# Patient Record
Sex: MALE | Race: BLACK OR AFRICAN AMERICAN | Employment: FULL TIME | URBAN - METROPOLITAN AREA
[De-identification: names, ages, dates, MRNs, and addresses within clinical notes are randomized per-mention and may not be internally consistent; named-entity substitution may affect disease eponyms.]

---

## 2017-05-27 ENCOUNTER — HOSPITAL ENCOUNTER (INPATIENT)
Age: 42
LOS: 2 days | Discharge: HOME | DRG: 383 | End: 2017-05-29
Attending: INTERNAL MEDICINE | Admitting: INTERNAL MEDICINE
Payer: MEDICARE

## 2017-05-27 DIAGNOSIS — L02.31 LEFT BUTTOCK ABSCESS: Primary | ICD-10-CM

## 2017-05-27 DIAGNOSIS — N17.9 ACUTE KIDNEY INJURY (HCC): ICD-10-CM

## 2017-05-27 PROBLEM — N18.9 ACUTE ON CHRONIC KIDNEY FAILURE (HCC): Status: ACTIVE | Noted: 2017-05-27

## 2017-05-27 LAB
ABSOLUTE EOS #: 0.3 K/UL (ref 0–0.4)
ABSOLUTE LYMPH #: 1.5 K/UL (ref 1–4.8)
ABSOLUTE MONO #: 1.2 K/UL (ref 0.2–0.8)
ANION GAP SERPL CALCULATED.3IONS-SCNC: 20 MMOL/L (ref 9–17)
BASOPHILS # BLD: 0 %
BASOPHILS ABSOLUTE: 0 K/UL (ref 0–0.2)
BUN BLDV-MCNC: 50 MG/DL (ref 6–20)
BUN/CREAT BLD: 16 (ref 9–20)
CALCIUM SERPL-MCNC: 9.6 MG/DL (ref 8.6–10.4)
CHLORIDE BLD-SCNC: 92 MMOL/L (ref 98–107)
CHOLESTEROL/HDL RATIO: 3
CHOLESTEROL: 142 MG/DL
CHP ED QC CHECK: NORMAL
CO2: 21 MMOL/L (ref 20–31)
CREAT SERPL-MCNC: 3.21 MG/DL (ref 0.7–1.2)
DIFFERENTIAL TYPE: ABNORMAL
EOSINOPHILS RELATIVE PERCENT: 2 %
GFR AFRICAN AMERICAN: 26 ML/MIN
GFR NON-AFRICAN AMERICAN: 21 ML/MIN
GFR SERPL CREATININE-BSD FRML MDRD: ABNORMAL ML/MIN/{1.73_M2}
GFR SERPL CREATININE-BSD FRML MDRD: ABNORMAL ML/MIN/{1.73_M2}
GLUCOSE BLD-MCNC: 276 MG/DL
GLUCOSE BLD-MCNC: 276 MG/DL (ref 75–110)
GLUCOSE BLD-MCNC: 293 MG/DL (ref 70–99)
GLUCOSE BLD-MCNC: 376 MG/DL (ref 75–110)
HCT VFR BLD CALC: 33.1 % (ref 41–53)
HDLC SERPL-MCNC: 47 MG/DL
HEMOGLOBIN: 11.3 G/DL (ref 13.5–17.5)
LACTIC ACID: 0.8 MMOL/L (ref 0.5–2.2)
LACTIC ACID: 1.1 MMOL/L (ref 0.5–2.2)
LDL CHOLESTEROL: 76 MG/DL (ref 0–130)
LYMPHOCYTES # BLD: 10 %
MCH RBC QN AUTO: 32.1 PG (ref 26–34)
MCHC RBC AUTO-ENTMCNC: 34 G/DL (ref 31–37)
MCV RBC AUTO: 94.4 FL (ref 80–100)
MONOCYTES # BLD: 8 %
PDW BLD-RTO: 13.9 % (ref 11.5–14.5)
PLATELET # BLD: 345 K/UL (ref 130–400)
PLATELET ESTIMATE: ABNORMAL
PMV BLD AUTO: 7.6 FL (ref 6–12)
POTASSIUM SERPL-SCNC: 5.3 MMOL/L (ref 3.7–5.3)
RBC # BLD: 3.5 M/UL (ref 4.5–5.9)
RBC # BLD: ABNORMAL 10*6/UL
SEG NEUTROPHILS: 80 %
SEGMENTED NEUTROPHILS ABSOLUTE COUNT: 12.5 K/UL (ref 1.8–7.7)
SODIUM BLD-SCNC: 133 MMOL/L (ref 135–144)
THYROXINE, FREE: 0.99 NG/DL (ref 0.93–1.7)
TRIGL SERPL-MCNC: 94 MG/DL
TSH SERPL DL<=0.05 MIU/L-ACNC: 0.88 MIU/L (ref 0.3–5)
VLDLC SERPL CALC-MCNC: NORMAL MG/DL (ref 1–30)
WBC # BLD: 15.4 K/UL (ref 3.5–11)
WBC # BLD: ABNORMAL 10*3/UL

## 2017-05-27 PROCEDURE — 6370000000 HC RX 637 (ALT 250 FOR IP): Performed by: NURSE PRACTITIONER

## 2017-05-27 PROCEDURE — 96375 TX/PRO/DX INJ NEW DRUG ADDON: CPT

## 2017-05-27 PROCEDURE — 99284 EMERGENCY DEPT VISIT MOD MDM: CPT

## 2017-05-27 PROCEDURE — 80061 LIPID PANEL: CPT

## 2017-05-27 PROCEDURE — 1200000000 HC SEMI PRIVATE

## 2017-05-27 PROCEDURE — 83605 ASSAY OF LACTIC ACID: CPT

## 2017-05-27 PROCEDURE — 36415 COLL VENOUS BLD VENIPUNCTURE: CPT

## 2017-05-27 PROCEDURE — 82947 ASSAY GLUCOSE BLOOD QUANT: CPT

## 2017-05-27 PROCEDURE — 6360000002 HC RX W HCPCS: Performed by: NURSE PRACTITIONER

## 2017-05-27 PROCEDURE — 80048 BASIC METABOLIC PNL TOTAL CA: CPT

## 2017-05-27 PROCEDURE — 96361 HYDRATE IV INFUSION ADD-ON: CPT

## 2017-05-27 PROCEDURE — 96376 TX/PRO/DX INJ SAME DRUG ADON: CPT

## 2017-05-27 PROCEDURE — 82043 UR ALBUMIN QUANTITATIVE: CPT

## 2017-05-27 PROCEDURE — 84443 ASSAY THYROID STIM HORMONE: CPT

## 2017-05-27 PROCEDURE — 85025 COMPLETE CBC W/AUTO DIFF WBC: CPT

## 2017-05-27 PROCEDURE — 2500000003 HC RX 250 WO HCPCS: Performed by: NURSE PRACTITIONER

## 2017-05-27 PROCEDURE — 87070 CULTURE OTHR SPECIMN AEROBIC: CPT

## 2017-05-27 PROCEDURE — 87205 SMEAR GRAM STAIN: CPT

## 2017-05-27 PROCEDURE — 96374 THER/PROPH/DIAG INJ IV PUSH: CPT

## 2017-05-27 PROCEDURE — 84439 ASSAY OF FREE THYROXINE: CPT

## 2017-05-27 PROCEDURE — 6370000000 HC RX 637 (ALT 250 FOR IP): Performed by: INTERNAL MEDICINE

## 2017-05-27 PROCEDURE — 87040 BLOOD CULTURE FOR BACTERIA: CPT

## 2017-05-27 PROCEDURE — 82570 ASSAY OF URINE CREATININE: CPT

## 2017-05-27 PROCEDURE — 2580000003 HC RX 258: Performed by: NURSE PRACTITIONER

## 2017-05-27 PROCEDURE — 83036 HEMOGLOBIN GLYCOSYLATED A1C: CPT

## 2017-05-27 RX ORDER — NICOTINE POLACRILEX 4 MG
15 LOZENGE BUCCAL PRN
Status: DISCONTINUED | OUTPATIENT
Start: 2017-05-27 | End: 2017-05-29 | Stop reason: HOSPADM

## 2017-05-27 RX ORDER — ACETAMINOPHEN 500 MG
1000 TABLET ORAL ONCE
Status: COMPLETED | OUTPATIENT
Start: 2017-05-27 | End: 2017-05-27

## 2017-05-27 RX ORDER — DEXTROSE MONOHYDRATE 25 G/50ML
12.5 INJECTION, SOLUTION INTRAVENOUS PRN
Status: DISCONTINUED | OUTPATIENT
Start: 2017-05-27 | End: 2017-05-29 | Stop reason: HOSPADM

## 2017-05-27 RX ORDER — ONDANSETRON 2 MG/ML
8 INJECTION INTRAMUSCULAR; INTRAVENOUS ONCE
Status: COMPLETED | OUTPATIENT
Start: 2017-05-27 | End: 2017-05-27

## 2017-05-27 RX ORDER — 0.9 % SODIUM CHLORIDE 0.9 %
1000 INTRAVENOUS SOLUTION INTRAVENOUS ONCE
Status: COMPLETED | OUTPATIENT
Start: 2017-05-27 | End: 2017-05-27

## 2017-05-27 RX ORDER — INSULIN GLARGINE 100 [IU]/ML
30 INJECTION, SOLUTION SUBCUTANEOUS NIGHTLY
Status: DISCONTINUED | OUTPATIENT
Start: 2017-05-27 | End: 2017-05-29 | Stop reason: HOSPADM

## 2017-05-27 RX ORDER — ONDANSETRON 2 MG/ML
4 INJECTION INTRAMUSCULAR; INTRAVENOUS EVERY 4 HOURS PRN
Status: DISCONTINUED | OUTPATIENT
Start: 2017-05-27 | End: 2017-05-29 | Stop reason: HOSPADM

## 2017-05-27 RX ORDER — MORPHINE SULFATE 2 MG/ML
2 INJECTION, SOLUTION INTRAMUSCULAR; INTRAVENOUS ONCE
Status: COMPLETED | OUTPATIENT
Start: 2017-05-27 | End: 2017-05-27

## 2017-05-27 RX ORDER — NICOTINE 21 MG/24HR
1 PATCH, TRANSDERMAL 24 HOURS TRANSDERMAL DAILY
Status: DISCONTINUED | OUTPATIENT
Start: 2017-05-28 | End: 2017-05-29 | Stop reason: HOSPADM

## 2017-05-27 RX ORDER — AMLODIPINE BESYLATE 10 MG/1
10 TABLET ORAL DAILY
Status: DISCONTINUED | OUTPATIENT
Start: 2017-05-28 | End: 2017-05-29 | Stop reason: HOSPADM

## 2017-05-27 RX ORDER — LIDOCAINE HYDROCHLORIDE 10 MG/ML
20 INJECTION, SOLUTION INFILTRATION; PERINEURAL ONCE
Status: COMPLETED | OUTPATIENT
Start: 2017-05-27 | End: 2017-05-27

## 2017-05-27 RX ORDER — DEXTROSE MONOHYDRATE 50 MG/ML
100 INJECTION, SOLUTION INTRAVENOUS PRN
Status: DISCONTINUED | OUTPATIENT
Start: 2017-05-27 | End: 2017-05-29 | Stop reason: HOSPADM

## 2017-05-27 RX ADMIN — VANCOMYCIN HYDROCHLORIDE 1500 MG: 1 INJECTION, POWDER, LYOPHILIZED, FOR SOLUTION INTRAVENOUS at 19:27

## 2017-05-27 RX ADMIN — SODIUM CHLORIDE 1000 ML: 9 INJECTION, SOLUTION INTRAVENOUS at 17:02

## 2017-05-27 RX ADMIN — ACETAMINOPHEN 1000 MG: 500 TABLET ORAL at 17:54

## 2017-05-27 RX ADMIN — ONDANSETRON 8 MG: 2 INJECTION INTRAMUSCULAR; INTRAVENOUS at 17:00

## 2017-05-27 RX ADMIN — Medication 5 UNITS: at 23:07

## 2017-05-27 RX ADMIN — MORPHINE SULFATE 2 MG: 2 INJECTION, SOLUTION INTRAMUSCULAR; INTRAVENOUS at 18:36

## 2017-05-27 RX ADMIN — Medication 30 UNITS: at 23:08

## 2017-05-27 RX ADMIN — MORPHINE SULFATE 2 MG: 2 INJECTION, SOLUTION INTRAMUSCULAR; INTRAVENOUS at 17:01

## 2017-05-27 RX ADMIN — LIDOCAINE HYDROCHLORIDE 20 ML: 10 INJECTION, SOLUTION INFILTRATION; PERINEURAL at 18:58

## 2017-05-27 ASSESSMENT — PAIN SCALES - GENERAL
PAINLEVEL_OUTOF10: 10

## 2017-05-27 ASSESSMENT — ENCOUNTER SYMPTOMS
CONSTIPATION: 0
SHORTNESS OF BREATH: 0
ABDOMINAL PAIN: 1
NAUSEA: 1
COLOR CHANGE: 1
VOMITING: 1
BACK PAIN: 0
DIARRHEA: 0
COUGH: 0

## 2017-05-28 LAB
ABSOLUTE EOS #: 0.1 K/UL (ref 0–0.4)
ABSOLUTE LYMPH #: 2.3 K/UL (ref 1–4.8)
ABSOLUTE MONO #: 1.9 K/UL (ref 0.2–0.8)
ANION GAP SERPL CALCULATED.3IONS-SCNC: 16 MMOL/L (ref 9–17)
BASOPHILS # BLD: 1 %
BASOPHILS ABSOLUTE: 0.1 K/UL (ref 0–0.2)
BUN BLDV-MCNC: 48 MG/DL (ref 6–20)
CHLORIDE BLD-SCNC: 97 MMOL/L (ref 98–107)
CHLORIDE, UR: 65 MMOL/L
CO2: 23 MMOL/L (ref 20–31)
CREAT SERPL-MCNC: 3.05 MG/DL (ref 0.7–1.2)
CREATININE URINE: 110.3 MG/DL (ref 39–259)
CREATININE URINE: 135 MG/DL (ref 39–259)
DIFFERENTIAL TYPE: ABNORMAL
EOSINOPHIL,URINE: NORMAL
EOSINOPHILS RELATIVE PERCENT: 1 %
GFR AFRICAN AMERICAN: 27 ML/MIN
GFR NON-AFRICAN AMERICAN: 23 ML/MIN
GFR SERPL CREATININE-BSD FRML MDRD: ABNORMAL ML/MIN/{1.73_M2}
GFR SERPL CREATININE-BSD FRML MDRD: ABNORMAL ML/MIN/{1.73_M2}
GLUCOSE BLD-MCNC: 122 MG/DL (ref 75–110)
GLUCOSE BLD-MCNC: 150 MG/DL (ref 75–110)
GLUCOSE BLD-MCNC: 170 MG/DL (ref 75–110)
GLUCOSE BLD-MCNC: 197 MG/DL (ref 75–110)
HCT VFR BLD CALC: 28.7 % (ref 41–53)
HEMOGLOBIN: 9.6 G/DL (ref 13.5–17.5)
LACTIC ACID: 0.8 MMOL/L (ref 0.5–2.2)
LACTIC ACID: 0.9 MMOL/L (ref 0.5–2.2)
LACTIC ACID: 1 MMOL/L (ref 0.5–2.2)
LYMPHOCYTES # BLD: 16 %
MCH RBC QN AUTO: 31.5 PG (ref 26–34)
MCHC RBC AUTO-ENTMCNC: 33.4 G/DL (ref 31–37)
MCV RBC AUTO: 94.5 FL (ref 80–100)
MICROALBUMIN/CREAT 24H UR: 731 MG/L
MICROALBUMIN/CREAT UR-RTO: 541 MCG/MG CREAT
MONOCYTES # BLD: 13 %
PDW BLD-RTO: 14 % (ref 11.5–14.5)
PLATELET # BLD: 304 K/UL (ref 130–400)
PLATELET ESTIMATE: ABNORMAL
PMV BLD AUTO: 7.3 FL (ref 6–12)
POTASSIUM SERPL-SCNC: 4.9 MMOL/L (ref 3.7–5.3)
POTASSIUM, UR: 17.6 MMOL/L
RBC # BLD: 3.04 M/UL (ref 4.5–5.9)
RBC # BLD: ABNORMAL 10*6/UL
SEG NEUTROPHILS: 69 %
SEGMENTED NEUTROPHILS ABSOLUTE COUNT: 10.1 K/UL (ref 1.8–7.7)
SODIUM BLD-SCNC: 136 MMOL/L (ref 135–144)
SODIUM,UR: 85 MMOL/L
TOTAL PROTEIN, URINE: 68 MG/DL
WBC # BLD: 14.5 K/UL (ref 3.5–11)
WBC # BLD: ABNORMAL 10*3/UL

## 2017-05-28 PROCEDURE — 85025 COMPLETE CBC W/AUTO DIFF WBC: CPT

## 2017-05-28 PROCEDURE — 6360000002 HC RX W HCPCS: Performed by: INTERNAL MEDICINE

## 2017-05-28 PROCEDURE — 86335 IMMUNFIX E-PHORSIS/URINE/CSF: CPT

## 2017-05-28 PROCEDURE — 2580000003 HC RX 258: Performed by: INTERNAL MEDICINE

## 2017-05-28 PROCEDURE — 84520 ASSAY OF UREA NITROGEN: CPT

## 2017-05-28 PROCEDURE — 82947 ASSAY GLUCOSE BLOOD QUANT: CPT

## 2017-05-28 PROCEDURE — 36415 COLL VENOUS BLD VENIPUNCTURE: CPT

## 2017-05-28 PROCEDURE — 51798 US URINE CAPACITY MEASURE: CPT

## 2017-05-28 PROCEDURE — 6370000000 HC RX 637 (ALT 250 FOR IP): Performed by: INTERNAL MEDICINE

## 2017-05-28 PROCEDURE — 83605 ASSAY OF LACTIC ACID: CPT

## 2017-05-28 PROCEDURE — 80051 ELECTROLYTE PANEL: CPT

## 2017-05-28 PROCEDURE — 82436 ASSAY OF URINE CHLORIDE: CPT

## 2017-05-28 PROCEDURE — 2580000003 HC RX 258: Performed by: NURSE PRACTITIONER

## 2017-05-28 PROCEDURE — 87205 SMEAR GRAM STAIN: CPT

## 2017-05-28 PROCEDURE — 1200000000 HC SEMI PRIVATE

## 2017-05-28 PROCEDURE — 84300 ASSAY OF URINE SODIUM: CPT

## 2017-05-28 PROCEDURE — 84156 ASSAY OF PROTEIN URINE: CPT

## 2017-05-28 PROCEDURE — 82565 ASSAY OF CREATININE: CPT

## 2017-05-28 PROCEDURE — 82570 ASSAY OF URINE CREATININE: CPT

## 2017-05-28 PROCEDURE — 84133 ASSAY OF URINE POTASSIUM: CPT

## 2017-05-28 PROCEDURE — 6360000002 HC RX W HCPCS: Performed by: NURSE PRACTITIONER

## 2017-05-28 RX ORDER — SODIUM CHLORIDE 9 MG/ML
INJECTION, SOLUTION INTRAVENOUS CONTINUOUS
Status: DISCONTINUED | OUTPATIENT
Start: 2017-05-28 | End: 2017-05-29 | Stop reason: HOSPADM

## 2017-05-28 RX ADMIN — Medication 1 UNITS: at 21:47

## 2017-05-28 RX ADMIN — Medication 30 UNITS: at 21:46

## 2017-05-28 RX ADMIN — VANCOMYCIN HYDROCHLORIDE 1500 MG: 1 INJECTION, POWDER, LYOPHILIZED, FOR SOLUTION INTRAVENOUS at 21:47

## 2017-05-28 RX ADMIN — INSULIN LISPRO 2 UNITS: 100 INJECTION, SOLUTION INTRAVENOUS; SUBCUTANEOUS at 17:07

## 2017-05-28 RX ADMIN — HYDROMORPHONE HYDROCHLORIDE 1 MG: 1 INJECTION, SOLUTION INTRAMUSCULAR; INTRAVENOUS; SUBCUTANEOUS at 21:46

## 2017-05-28 RX ADMIN — INSULIN LISPRO 2 UNITS: 100 INJECTION, SOLUTION INTRAVENOUS; SUBCUTANEOUS at 12:19

## 2017-05-28 RX ADMIN — HYDROMORPHONE HYDROCHLORIDE 1 MG: 1 INJECTION, SOLUTION INTRAMUSCULAR; INTRAVENOUS; SUBCUTANEOUS at 01:23

## 2017-05-28 RX ADMIN — SODIUM CHLORIDE: 9 INJECTION, SOLUTION INTRAVENOUS at 11:42

## 2017-05-28 RX ADMIN — HYDROMORPHONE HYDROCHLORIDE 1 MG: 1 INJECTION, SOLUTION INTRAMUSCULAR; INTRAVENOUS; SUBCUTANEOUS at 15:47

## 2017-05-28 RX ADMIN — SODIUM CHLORIDE: 9 INJECTION, SOLUTION INTRAVENOUS at 19:00

## 2017-05-28 RX ADMIN — HYDROMORPHONE HYDROCHLORIDE 1 MG: 1 INJECTION, SOLUTION INTRAMUSCULAR; INTRAVENOUS; SUBCUTANEOUS at 09:32

## 2017-05-28 RX ADMIN — AMLODIPINE BESYLATE 10 MG: 10 TABLET ORAL at 09:21

## 2017-05-28 ASSESSMENT — PAIN DESCRIPTION - ONSET
ONSET: ON-GOING
ONSET: GRADUAL

## 2017-05-28 ASSESSMENT — PAIN DESCRIPTION - DESCRIPTORS
DESCRIPTORS: DISCOMFORT;PRESSURE;SHARP
DESCRIPTORS: TENDER;SHARP
DESCRIPTORS: THROBBING
DESCRIPTORS: THROBBING

## 2017-05-28 ASSESSMENT — PAIN DESCRIPTION - LOCATION
LOCATION: BUTTOCKS

## 2017-05-28 ASSESSMENT — PAIN DESCRIPTION - PAIN TYPE
TYPE: ACUTE PAIN

## 2017-05-28 ASSESSMENT — PAIN SCALES - GENERAL
PAINLEVEL_OUTOF10: 8
PAINLEVEL_OUTOF10: 0
PAINLEVEL_OUTOF10: 3
PAINLEVEL_OUTOF10: 8
PAINLEVEL_OUTOF10: 6
PAINLEVEL_OUTOF10: 3
PAINLEVEL_OUTOF10: 8
PAINLEVEL_OUTOF10: 8
PAINLEVEL_OUTOF10: 9

## 2017-05-28 ASSESSMENT — PAIN DESCRIPTION - ORIENTATION
ORIENTATION: LEFT;INNER
ORIENTATION: LEFT

## 2017-05-28 ASSESSMENT — PAIN DESCRIPTION - FREQUENCY
FREQUENCY: CONTINUOUS
FREQUENCY: INTERMITTENT
FREQUENCY: CONTINUOUS
FREQUENCY: CONTINUOUS

## 2017-05-29 VITALS
TEMPERATURE: 97.9 F | HEART RATE: 72 BPM | DIASTOLIC BLOOD PRESSURE: 72 MMHG | HEIGHT: 67 IN | OXYGEN SATURATION: 100 % | BODY MASS INDEX: 35.31 KG/M2 | WEIGHT: 225 LBS | SYSTOLIC BLOOD PRESSURE: 132 MMHG | RESPIRATION RATE: 18 BRPM

## 2017-05-29 LAB
ANION GAP SERPL CALCULATED.3IONS-SCNC: 13 MMOL/L (ref 9–17)
BUN BLDV-MCNC: 39 MG/DL (ref 6–20)
CHLORIDE BLD-SCNC: 100 MMOL/L (ref 98–107)
CO2: 22 MMOL/L (ref 20–31)
COMPLEMENT C3: 151 MG/DL (ref 90–180)
COMPLEMENT C4: 40 MG/DL (ref 10–40)
CREAT SERPL-MCNC: 2.44 MG/DL (ref 0.7–1.2)
CULTURE: ABNORMAL
CULTURE: ABNORMAL
DIRECT EXAM: ABNORMAL
ESTIMATED AVERAGE GLUCOSE: 174 MG/DL
GFR AFRICAN AMERICAN: 35 ML/MIN
GFR NON-AFRICAN AMERICAN: 29 ML/MIN
GFR SERPL CREATININE-BSD FRML MDRD: ABNORMAL ML/MIN/{1.73_M2}
GFR SERPL CREATININE-BSD FRML MDRD: ABNORMAL ML/MIN/{1.73_M2}
GLUCOSE BLD-MCNC: 122 MG/DL (ref 75–110)
GLUCOSE BLD-MCNC: 185 MG/DL (ref 75–110)
HBA1C MFR BLD: 7.7 % (ref 4–6)
Lab: ABNORMAL
POTASSIUM SERPL-SCNC: 4.5 MMOL/L (ref 3.7–5.3)
SODIUM BLD-SCNC: 135 MMOL/L (ref 135–144)
SPECIMEN DESCRIPTION: ABNORMAL
SPECIMEN DESCRIPTION: ABNORMAL
STATUS: ABNORMAL

## 2017-05-29 PROCEDURE — 82565 ASSAY OF CREATININE: CPT

## 2017-05-29 PROCEDURE — 80051 ELECTROLYTE PANEL: CPT

## 2017-05-29 PROCEDURE — 36415 COLL VENOUS BLD VENIPUNCTURE: CPT

## 2017-05-29 PROCEDURE — 6360000002 HC RX W HCPCS: Performed by: INTERNAL MEDICINE

## 2017-05-29 PROCEDURE — 86160 COMPLEMENT ANTIGEN: CPT

## 2017-05-29 PROCEDURE — 86038 ANTINUCLEAR ANTIBODIES: CPT

## 2017-05-29 PROCEDURE — 82947 ASSAY GLUCOSE BLOOD QUANT: CPT

## 2017-05-29 PROCEDURE — 2580000003 HC RX 258: Performed by: INTERNAL MEDICINE

## 2017-05-29 PROCEDURE — 86162 COMPLEMENT TOTAL (CH50): CPT

## 2017-05-29 PROCEDURE — 84520 ASSAY OF UREA NITROGEN: CPT

## 2017-05-29 PROCEDURE — 6370000000 HC RX 637 (ALT 250 FOR IP): Performed by: INTERNAL MEDICINE

## 2017-05-29 RX ORDER — HYDROCODONE BITARTRATE AND ACETAMINOPHEN 7.5; 325 MG/1; MG/1
1 TABLET ORAL EVERY 6 HOURS PRN
Qty: 20 TABLET | Refills: 0 | Status: SHIPPED | OUTPATIENT
Start: 2017-05-29 | End: 2020-07-08

## 2017-05-29 RX ORDER — NICOTINE 21 MG/24HR
1 PATCH, TRANSDERMAL 24 HOURS TRANSDERMAL DAILY
Qty: 30 PATCH | Refills: 0 | Status: SHIPPED | OUTPATIENT
Start: 2017-05-29 | End: 2020-05-26

## 2017-05-29 RX ORDER — CLINDAMYCIN HYDROCHLORIDE 150 MG/1
150 CAPSULE ORAL 3 TIMES DAILY
Qty: 30 CAPSULE | Refills: 0 | Status: SHIPPED | OUTPATIENT
Start: 2017-05-29 | End: 2017-06-08

## 2017-05-29 RX ORDER — CIPROFLOXACIN 500 MG/1
500 TABLET, FILM COATED ORAL 2 TIMES DAILY
Qty: 20 TABLET | Refills: 0 | Status: SHIPPED | OUTPATIENT
Start: 2017-05-29 | End: 2017-06-08

## 2017-05-29 RX ORDER — AMLODIPINE BESYLATE 10 MG/1
10 TABLET ORAL DAILY
Qty: 30 TABLET | Refills: 0 | Status: SHIPPED | OUTPATIENT
Start: 2017-05-29

## 2017-05-29 RX ADMIN — AMLODIPINE BESYLATE 10 MG: 10 TABLET ORAL at 08:30

## 2017-05-29 RX ADMIN — INSULIN LISPRO 2 UNITS: 100 INJECTION, SOLUTION INTRAVENOUS; SUBCUTANEOUS at 12:17

## 2017-05-29 RX ADMIN — HYDROMORPHONE HYDROCHLORIDE 1 MG: 1 INJECTION, SOLUTION INTRAMUSCULAR; INTRAVENOUS; SUBCUTANEOUS at 02:15

## 2017-05-29 RX ADMIN — HYDROMORPHONE HYDROCHLORIDE 1 MG: 1 INJECTION, SOLUTION INTRAMUSCULAR; INTRAVENOUS; SUBCUTANEOUS at 08:29

## 2017-05-29 RX ADMIN — SODIUM CHLORIDE: 9 INJECTION, SOLUTION INTRAVENOUS at 04:47

## 2017-05-29 ASSESSMENT — PAIN DESCRIPTION - PROGRESSION: CLINICAL_PROGRESSION: GRADUALLY IMPROVING

## 2017-05-29 ASSESSMENT — PAIN DESCRIPTION - FREQUENCY
FREQUENCY: CONTINUOUS
FREQUENCY: CONTINUOUS

## 2017-05-29 ASSESSMENT — PAIN DESCRIPTION - LOCATION
LOCATION: BUTTOCKS
LOCATION: BUTTOCKS

## 2017-05-29 ASSESSMENT — PAIN DESCRIPTION - DESCRIPTORS
DESCRIPTORS: SHARP;THROBBING
DESCRIPTORS: ACHING;BURNING

## 2017-05-29 ASSESSMENT — PAIN SCALES - GENERAL
PAINLEVEL_OUTOF10: 8
PAINLEVEL_OUTOF10: 6
PAINLEVEL_OUTOF10: 9
PAINLEVEL_OUTOF10: 0

## 2017-05-29 ASSESSMENT — PAIN DESCRIPTION - ONSET
ONSET: ON-GOING
ONSET: ON-GOING

## 2017-05-29 ASSESSMENT — PAIN DESCRIPTION - PAIN TYPE
TYPE: ACUTE PAIN
TYPE: ACUTE PAIN;SURGICAL PAIN

## 2017-05-29 ASSESSMENT — PAIN DESCRIPTION - ORIENTATION
ORIENTATION: LEFT;INNER
ORIENTATION: LEFT

## 2017-05-30 LAB
ANTI-NUCLEAR ANTIBODY (ANA): NEGATIVE
URINE IFX INTERP: NORMAL
URINE IFX SPECIMEN: NORMAL
URINE TOTAL PROTEIN: 67 MG/DL
VOLUME: NORMAL ML

## 2017-06-01 LAB — COMPLEMENT TOTAL (CH50): 157 CAE UNITS (ref 60–144)

## 2017-06-02 LAB
CULTURE: NORMAL
Lab: NORMAL
Lab: NORMAL
SPECIMEN DESCRIPTION: NORMAL
STATUS: NORMAL
STATUS: NORMAL

## 2020-01-02 ENCOUNTER — TELEPHONE (OUTPATIENT)
Dept: VASCULAR SURGERY | Age: 45
End: 2020-01-02

## 2020-01-03 ENCOUNTER — TELEPHONE (OUTPATIENT)
Dept: VASCULAR SURGERY | Age: 45
End: 2020-01-03

## 2020-01-03 NOTE — TELEPHONE ENCOUNTER
Left VM  For the second time due to not having Vein mapping done prior to appt.     Told pt to call back the office to reschedule appt and schedule testing

## 2020-01-29 ENCOUNTER — HOSPITAL ENCOUNTER (OUTPATIENT)
Dept: VASCULAR LAB | Age: 45
Discharge: HOME | End: 2020-01-29
Payer: COMMERCIAL

## 2020-01-29 PROCEDURE — 93970 EXTREMITY STUDY: CPT

## 2020-03-27 ENCOUNTER — TELEPHONE (OUTPATIENT)
Dept: VASCULAR SURGERY | Age: 45
End: 2020-03-27

## 2020-05-08 ENCOUNTER — INITIAL CONSULT (OUTPATIENT)
Dept: VASCULAR SURGERY | Age: 45
End: 2020-05-08
Payer: COMMERCIAL

## 2020-05-08 VITALS
OXYGEN SATURATION: 98 % | WEIGHT: 189 LBS | TEMPERATURE: 97.3 F | DIASTOLIC BLOOD PRESSURE: 79 MMHG | HEIGHT: 67 IN | HEART RATE: 99 BPM | BODY MASS INDEX: 29.66 KG/M2 | SYSTOLIC BLOOD PRESSURE: 112 MMHG

## 2020-05-08 PROCEDURE — 99244 OFF/OP CNSLTJ NEW/EST MOD 40: CPT | Performed by: SURGERY

## 2020-05-08 PROCEDURE — G8427 DOCREV CUR MEDS BY ELIG CLIN: HCPCS | Performed by: SURGERY

## 2020-05-08 PROCEDURE — G8419 CALC BMI OUT NRM PARAM NOF/U: HCPCS | Performed by: SURGERY

## 2020-05-08 RX ORDER — MAGNESIUM OXIDE 400 MG/1
TABLET ORAL
COMMUNITY
Start: 2020-03-10 | End: 2020-11-12

## 2020-05-08 RX ORDER — CARVEDILOL 6.25 MG/1
TABLET ORAL
COMMUNITY
Start: 2020-04-21

## 2020-05-08 RX ORDER — INSULIN ASPART 100 [IU]/ML
INJECTION, SOLUTION INTRAVENOUS; SUBCUTANEOUS
COMMUNITY

## 2020-05-08 RX ORDER — SUCROFERRIC OXYHYDROXIDE 500 MG/1
TABLET, CHEWABLE ORAL
COMMUNITY
Start: 2020-05-06

## 2020-05-08 RX ORDER — VITAMIN B COMPLEX
CAPSULE ORAL
COMMUNITY
Start: 2020-04-21

## 2020-05-08 RX ORDER — GABAPENTIN 100 MG/1
300 CAPSULE ORAL DAILY
COMMUNITY
Start: 2020-04-21

## 2020-05-08 RX ORDER — PANTOPRAZOLE SODIUM 40 MG/1
TABLET, DELAYED RELEASE ORAL
COMMUNITY

## 2020-05-08 RX ORDER — INSULIN DETEMIR 100 [IU]/ML
INJECTION, SOLUTION SUBCUTANEOUS
COMMUNITY
Start: 2020-04-24

## 2020-05-08 NOTE — PROGRESS NOTES
Exam  Constitutional:       Appearance: He is well-developed and well-groomed. Eyes:      Extraocular Movements: Extraocular movements intact. Conjunctiva/sclera: Conjunctivae normal.   Neck:      Musculoskeletal: Full passive range of motion without pain. Vascular: No carotid bruit. Cardiovascular:      Rate and Rhythm: Normal rate and regular rhythm. Pulses:           Radial pulses are 2+ on the right side and 2+ on the left side. Pulmonary:      Effort: Pulmonary effort is normal. No respiratory distress. Chest:      Comments: Tunneled catheter in place, suture removed and catheter cleaned  Abdominal:      Palpations: Abdomen is soft. Tenderness: There is no abdominal tenderness. Musculoskeletal:      Left upper arm: He exhibits no tenderness and no swelling. Left hand: He exhibits no tenderness and normal capillary refill. Normal sensation noted. Normal strength noted. Skin:     General: Skin is warm. Capillary Refill: Capillary refill takes less than 2 seconds. Neurological:      Mental Status: He is alert and oriented to person, place, and time. GCS: GCS eye subscore is 4. GCS verbal subscore is 5. GCS motor subscore is 6. Sensory: Sensation is intact. Motor: Motor function is intact. Psychiatric:         Mood and Affect: Mood normal.         Speech: Speech normal.         Behavior: Behavior normal.         Thought Content:  Thought content normal.       Imaging/Labs:         Assessment and Plan:     ESRD on hemodialysis  · Risks and benefits of surgically created dialysis access discussed  · Non-maturation, need for surgical revision/maintenance and ischemic steal (decreased blood flow to hand) discussed  · Consent obtained in the office  · Will plan on using his left cephalic vein for fistula creation  · Ok to use tunneled catheter for IV access    The patient was counseled at length about the risks of jaime Covid-19 during their perioperative period and any recovery window from their procedure. The patient was made aware that jaime Covid-19  may worsen their prognosis for recovering from their procedure  and lend to a higher morbidity and/or mortality risk. All material risks, benefits, and reasonable alternatives including postponing the procedure were discussed. The patient does wish to proceed with the procedure at this time. Electronically signed by Sly Valdez MD on 5/8/20 at 11:21 AM EDT      [address] [place] [address]  Office: [phone]  Cell: [phone]  Email: [email or web address]

## 2020-05-09 ASSESSMENT — ENCOUNTER SYMPTOMS
ABDOMINAL PAIN: 0
CHEST TIGHTNESS: 0
COLOR CHANGE: 0
SHORTNESS OF BREATH: 0
ALLERGIC/IMMUNOLOGIC NEGATIVE: 1
COUGH: 0

## 2020-05-24 ENCOUNTER — HOSPITAL ENCOUNTER (OUTPATIENT)
Dept: PREADMISSION TESTING | Age: 45
Setting detail: SPECIMEN
Discharge: HOME | End: 2020-05-28
Payer: COMMERCIAL

## 2020-05-25 PROCEDURE — U0004 COV-19 TEST NON-CDC HGH THRU: HCPCS

## 2020-05-26 LAB
SARS-COV-2, PCR: NOT DETECTED
SARS-COV-2, RAPID: NORMAL
SARS-COV-2: NORMAL
SOURCE: NORMAL

## 2020-05-27 ENCOUNTER — ANESTHESIA EVENT (OUTPATIENT)
Dept: OPERATING ROOM | Age: 45
End: 2020-05-27
Payer: COMMERCIAL

## 2020-05-27 ENCOUNTER — ANESTHESIA (OUTPATIENT)
Dept: OPERATING ROOM | Age: 45
End: 2020-05-27
Payer: COMMERCIAL

## 2020-05-27 ENCOUNTER — HOSPITAL ENCOUNTER (OUTPATIENT)
Age: 45
Setting detail: OUTPATIENT SURGERY
Discharge: HOME | End: 2020-05-27
Attending: SURGERY | Admitting: SURGERY
Payer: COMMERCIAL

## 2020-05-27 VITALS
OXYGEN SATURATION: 100 % | TEMPERATURE: 96.8 F | DIASTOLIC BLOOD PRESSURE: 80 MMHG | WEIGHT: 193.12 LBS | HEIGHT: 67 IN | SYSTOLIC BLOOD PRESSURE: 133 MMHG | RESPIRATION RATE: 16 BRPM | HEART RATE: 73 BPM | BODY MASS INDEX: 30.31 KG/M2

## 2020-05-27 VITALS
OXYGEN SATURATION: 100 % | DIASTOLIC BLOOD PRESSURE: 67 MMHG | SYSTOLIC BLOOD PRESSURE: 98 MMHG | RESPIRATION RATE: 19 BRPM | TEMPERATURE: 93.6 F

## 2020-05-27 LAB
EKG ATRIAL RATE: 75 BPM
EKG P AXIS: 52 DEGREES
EKG P-R INTERVAL: 178 MS
EKG Q-T INTERVAL: 418 MS
EKG QRS DURATION: 90 MS
EKG QTC CALCULATION (BAZETT): 466 MS
EKG R AXIS: 68 DEGREES
EKG T AXIS: 76 DEGREES
EKG VENTRICULAR RATE: 75 BPM
GLUCOSE BLD-MCNC: 168 MG/DL (ref 75–110)
GLUCOSE BLD-MCNC: 189 MG/DL (ref 74–100)
POC POTASSIUM: 5 MMOL/L (ref 3.5–4.5)

## 2020-05-27 PROCEDURE — 93005 ELECTROCARDIOGRAM TRACING: CPT | Performed by: SURGERY

## 2020-05-27 PROCEDURE — 82947 ASSAY GLUCOSE BLOOD QUANT: CPT

## 2020-05-27 PROCEDURE — 7100000040 HC SPAR PHASE II RECOVERY - FIRST 15 MIN: Performed by: SURGERY

## 2020-05-27 PROCEDURE — 84132 ASSAY OF SERUM POTASSIUM: CPT

## 2020-05-27 PROCEDURE — 6360000002 HC RX W HCPCS: Performed by: SURGERY

## 2020-05-27 PROCEDURE — 3700000000 HC ANESTHESIA ATTENDED CARE: Performed by: SURGERY

## 2020-05-27 PROCEDURE — 2580000003 HC RX 258: Performed by: ANESTHESIOLOGY

## 2020-05-27 PROCEDURE — 2580000003 HC RX 258: Performed by: SURGERY

## 2020-05-27 PROCEDURE — 6360000002 HC RX W HCPCS: Performed by: SPECIALIST

## 2020-05-27 PROCEDURE — 3700000001 HC ADD 15 MINUTES (ANESTHESIA): Performed by: SURGERY

## 2020-05-27 PROCEDURE — 6360000002 HC RX W HCPCS: Performed by: ANESTHESIOLOGY

## 2020-05-27 PROCEDURE — 6370000000 HC RX 637 (ALT 250 FOR IP): Performed by: SURGERY

## 2020-05-27 PROCEDURE — 2709999900 HC NON-CHARGEABLE SUPPLY: Performed by: SURGERY

## 2020-05-27 PROCEDURE — 3600000014 HC SURGERY LEVEL 4 ADDTL 15MIN: Performed by: SURGERY

## 2020-05-27 PROCEDURE — 36821 AV FUSION DIRECT ANY SITE: CPT | Performed by: SURGERY

## 2020-05-27 PROCEDURE — 7100000041 HC SPAR PHASE II RECOVERY - ADDTL 15 MIN: Performed by: SURGERY

## 2020-05-27 PROCEDURE — 3600000004 HC SURGERY LEVEL 4 BASE: Performed by: SURGERY

## 2020-05-27 PROCEDURE — 2500000003 HC RX 250 WO HCPCS: Performed by: SURGERY

## 2020-05-27 PROCEDURE — 93010 ELECTROCARDIOGRAM REPORT: CPT | Performed by: INTERNAL MEDICINE

## 2020-05-27 RX ORDER — FENTANYL CITRATE 50 UG/ML
INJECTION, SOLUTION INTRAMUSCULAR; INTRAVENOUS PRN
Status: DISCONTINUED | OUTPATIENT
Start: 2020-05-27 | End: 2020-05-27 | Stop reason: SDUPTHER

## 2020-05-27 RX ORDER — PROPOFOL 10 MG/ML
INJECTION, EMULSION INTRAVENOUS PRN
Status: DISCONTINUED | OUTPATIENT
Start: 2020-05-27 | End: 2020-05-27 | Stop reason: SDUPTHER

## 2020-05-27 RX ORDER — MIDAZOLAM HYDROCHLORIDE 1 MG/ML
1 INJECTION INTRAMUSCULAR; INTRAVENOUS EVERY 10 MIN PRN
Status: DISCONTINUED | OUTPATIENT
Start: 2020-05-27 | End: 2020-05-27 | Stop reason: HOSPADM

## 2020-05-27 RX ORDER — SODIUM CHLORIDE, SODIUM LACTATE, POTASSIUM CHLORIDE, CALCIUM CHLORIDE 600; 310; 30; 20 MG/100ML; MG/100ML; MG/100ML; MG/100ML
INJECTION, SOLUTION INTRAVENOUS CONTINUOUS
Status: DISCONTINUED | OUTPATIENT
Start: 2020-05-27 | End: 2020-05-27

## 2020-05-27 RX ORDER — LIDOCAINE HYDROCHLORIDE 10 MG/ML
INJECTION, SOLUTION INFILTRATION; PERINEURAL PRN
Status: DISCONTINUED | OUTPATIENT
Start: 2020-05-27 | End: 2020-05-27 | Stop reason: ALTCHOICE

## 2020-05-27 RX ORDER — SODIUM CHLORIDE 0.9 % (FLUSH) 0.9 %
10 SYRINGE (ML) INJECTION EVERY 12 HOURS SCHEDULED
Status: DISCONTINUED | OUTPATIENT
Start: 2020-05-27 | End: 2020-05-27 | Stop reason: HOSPADM

## 2020-05-27 RX ORDER — HEPARIN SODIUM 1000 [USP'U]/ML
INJECTION, SOLUTION INTRAVENOUS; SUBCUTANEOUS PRN
Status: DISCONTINUED | OUTPATIENT
Start: 2020-05-27 | End: 2020-05-27 | Stop reason: ALTCHOICE

## 2020-05-27 RX ORDER — SODIUM CHLORIDE 0.9 % (FLUSH) 0.9 %
10 SYRINGE (ML) INJECTION PRN
Status: DISCONTINUED | OUTPATIENT
Start: 2020-05-27 | End: 2020-05-27 | Stop reason: HOSPADM

## 2020-05-27 RX ORDER — PROPOFOL 10 MG/ML
INJECTION, EMULSION INTRAVENOUS CONTINUOUS PRN
Status: DISCONTINUED | OUTPATIENT
Start: 2020-05-27 | End: 2020-05-27 | Stop reason: SDUPTHER

## 2020-05-27 RX ORDER — LIDOCAINE HYDROCHLORIDE 10 MG/ML
1 INJECTION, SOLUTION EPIDURAL; INFILTRATION; INTRACAUDAL; PERINEURAL
Status: DISCONTINUED | OUTPATIENT
Start: 2020-05-27 | End: 2020-05-27 | Stop reason: HOSPADM

## 2020-05-27 RX ORDER — ASPIRIN 81 MG/1
81 TABLET, CHEWABLE ORAL DAILY
Qty: 30 TABLET | Refills: 3 | Status: SHIPPED | OUTPATIENT
Start: 2020-05-27

## 2020-05-27 RX ORDER — MIDAZOLAM HYDROCHLORIDE 1 MG/ML
INJECTION INTRAMUSCULAR; INTRAVENOUS PRN
Status: DISCONTINUED | OUTPATIENT
Start: 2020-05-27 | End: 2020-05-27 | Stop reason: SDUPTHER

## 2020-05-27 RX ORDER — SODIUM CHLORIDE 9 MG/ML
INJECTION, SOLUTION INTRAVENOUS CONTINUOUS
Status: DISCONTINUED | OUTPATIENT
Start: 2020-05-27 | End: 2020-05-27 | Stop reason: HOSPADM

## 2020-05-27 RX ORDER — MAGNESIUM HYDROXIDE 1200 MG/15ML
LIQUID ORAL CONTINUOUS PRN
Status: COMPLETED | OUTPATIENT
Start: 2020-05-27 | End: 2020-05-27

## 2020-05-27 RX ORDER — ONDANSETRON 2 MG/ML
INJECTION INTRAMUSCULAR; INTRAVENOUS PRN
Status: DISCONTINUED | OUTPATIENT
Start: 2020-05-27 | End: 2020-05-27 | Stop reason: SDUPTHER

## 2020-05-27 RX ORDER — FENTANYL CITRATE 50 UG/ML
25 INJECTION, SOLUTION INTRAMUSCULAR; INTRAVENOUS EVERY 5 MIN PRN
Status: DISCONTINUED | OUTPATIENT
Start: 2020-05-27 | End: 2020-05-27 | Stop reason: HOSPADM

## 2020-05-27 RX ADMIN — PHENYLEPHRINE HYDROCHLORIDE 50 MCG: 10 INJECTION INTRAVENOUS at 11:48

## 2020-05-27 RX ADMIN — MIDAZOLAM HYDROCHLORIDE 1 MG: 1 INJECTION, SOLUTION INTRAMUSCULAR; INTRAVENOUS at 10:19

## 2020-05-27 RX ADMIN — PROPOFOL 20 MG: 10 INJECTION, EMULSION INTRAVENOUS at 12:00

## 2020-05-27 RX ADMIN — PHENYLEPHRINE HYDROCHLORIDE 50 MCG: 10 INJECTION INTRAVENOUS at 11:20

## 2020-05-27 RX ADMIN — MIDAZOLAM HYDROCHLORIDE 1 MG: 1 INJECTION, SOLUTION INTRAMUSCULAR; INTRAVENOUS at 10:44

## 2020-05-27 RX ADMIN — SODIUM CHLORIDE: 9 INJECTION, SOLUTION INTRAVENOUS at 09:56

## 2020-05-27 RX ADMIN — ONDANSETRON 4 MG: 2 INJECTION, SOLUTION INTRAMUSCULAR; INTRAVENOUS at 11:47

## 2020-05-27 RX ADMIN — PROPOFOL 20 MG: 10 INJECTION, EMULSION INTRAVENOUS at 11:49

## 2020-05-27 RX ADMIN — MIDAZOLAM HYDROCHLORIDE 1 MG: 1 INJECTION, SOLUTION INTRAMUSCULAR; INTRAVENOUS at 10:50

## 2020-05-27 RX ADMIN — CEFAZOLIN 2 G: 10 INJECTION, POWDER, FOR SOLUTION INTRAVENOUS at 10:58

## 2020-05-27 RX ADMIN — PHENYLEPHRINE HYDROCHLORIDE 100 MCG: 10 INJECTION INTRAVENOUS at 11:30

## 2020-05-27 RX ADMIN — PROPOFOL 100 MCG/KG/MIN: 10 INJECTION, EMULSION INTRAVENOUS at 10:50

## 2020-05-27 RX ADMIN — PROPOFOL 30 MG: 10 INJECTION, EMULSION INTRAVENOUS at 10:52

## 2020-05-27 RX ADMIN — HEPARIN SODIUM 5000 UNITS: 1000 INJECTION INTRAVENOUS; SUBCUTANEOUS at 11:24

## 2020-05-27 RX ADMIN — PHENYLEPHRINE HYDROCHLORIDE 100 MCG: 10 INJECTION INTRAVENOUS at 11:39

## 2020-05-27 RX ADMIN — FENTANYL CITRATE 50 MCG: 50 INJECTION INTRAMUSCULAR; INTRAVENOUS at 10:46

## 2020-05-27 RX ADMIN — PHENYLEPHRINE HYDROCHLORIDE 100 MCG: 10 INJECTION INTRAVENOUS at 11:24

## 2020-05-27 ASSESSMENT — PULMONARY FUNCTION TESTS
PIF_VALUE: 0
PIF_VALUE: 1
PIF_VALUE: 0
PIF_VALUE: 1
PIF_VALUE: 0

## 2020-05-27 ASSESSMENT — PAIN - FUNCTIONAL ASSESSMENT: PAIN_FUNCTIONAL_ASSESSMENT: 0-10

## 2020-05-27 ASSESSMENT — PAIN SCALES - GENERAL
PAINLEVEL_OUTOF10: 0

## 2020-05-27 ASSESSMENT — PAIN DESCRIPTION - DESCRIPTORS: DESCRIPTORS: DULL;DISCOMFORT

## 2020-05-27 NOTE — H&P
discussed  ? Non-maturation, need for surgical revision/maintenance and ischemic steal (decreased blood flow to hand) discussed  ? Consent obtained in the office  ? Will plan on using his left cephalic vein for fistula creation  ? Ok to use tunneled catheter for IV access     The patient was counseled at length about the risks of jaime Covid-19 during their perioperative period and any recovery window from their procedure.  The patient was made aware that jaime Covid-19  may worsen their prognosis for recovering from their procedure  and lend to a higher morbidity and/or mortality risk.  All material risks, benefits, and reasonable alternatives including postponing the procedure were discussed.  The patient does wish to proceed with the procedure at this time.

## 2020-05-27 NOTE — ANESTHESIA PRE PROCEDURE
Department of Anesthesiology  Preprocedure Note       Name:  Carine Gupta   Age:  53 y.o.  :  1975                                          MRN:  [id number]         Date:  2020      Surgeon: Jim Thompson): Karyle Chatters, MD    Procedure: Procedure(s):  AV FISTULA CREATION UPPER EXTREMITY    Medications prior to admission:   Prior to Admission medications    Medication Sig Start Date End Date Taking? Authorizing Provider   B Complex CAPS TAKE 1 CAPSULE BY MOUTH EVERY DAY 20  Yes Historical Provider, MD   carvedilol (COREG) 6.25 MG tablet TAKE 1 TABLET BY MOUTH TWICE A DAY 20  Yes Historical Provider, MD   gabapentin (NEURONTIN) 100 MG capsule daily. 20  Yes Historical Provider, MD   insulin aspart (NOVOLOG FLEXPEN) 100 UNIT/ML injection pen Novolog Flexpen U-100 Insulin aspart 100 unit/mL (3 mL) subcutaneous   Yes Historical Provider, MD   LEVEMIR FLEXTOUCH 100 UNIT/ML injection pen INJECT 10 UNIT(S) TWICE A DAY BY SUBCUTANEOUS ROUTE. 20  Yes Historical Provider, MD   magnesium oxide (MAG-OX) 400 MG tablet TAKE 1 TABLET BY MOUTH EVERY DAY 3/10/20  Yes Historical Provider, MD   pantoprazole (PROTONIX) 40 MG tablet pantoprazole 40 mg tablet,delayed release   Yes Historical Provider, MD   VELPHORO 500 MG CHEW CRUSH OR CHEW AND SWALLOW 1 TABLET 3 TIMES A DAY WITH MEALS 20  Yes Historical Provider, MD   amLODIPine (NORVASC) 10 MG tablet Take 1 tablet by mouth daily Further refills by PCP 17  Yes Dennis Alejandro MD   insulin glargine (LANTUS SOLOSTAR) 100 UNIT/ML injection pen Inject 30 Units into the skin nightly Further refills by PCP. Dose adjustment per PCP. Also dispense needles. 17  Yes Dennis Alejandro MD   OMEPRAZOLE PO Take by mouth   Yes Historical Provider, MD   HYDROcodone-acetaminophen (NORCO) 7.5-325 MG per tablet Take 1 tablet by mouth every 6 hours as needed for Pain  Further refills by PCP. Thuy Huynh   Patient not taking: Reported on 2020 Joann Hunt MD       Current medications:    No current facility-administered medications for this encounter.         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 Pavan Rosa    Presence of permanent central venous catheter     dialysis catheter, right side of chest       Past Surgical History:        Procedure Laterality Date    CHOLECYSTECTOMY      SCROTAL SURGERY Bilateral 10/11/2019    INCISION AND DRAINAGE SCROTUM    SCROTAL SURGERY Bilateral 10/15/2019    INCISION AND DRAINAGE SCROTUM    TESTICLE REMOVAL Right 10/18/2019    EXCISION ABSCESS SCROTAL SCROTAL ABSCESS DEBRIDEMENT AND WOUND VAC APPLICATION, RIGHT ORCHIECTOMY;    TUNNELED VENOUS CATHETER [address] , dialysis catheter       Social History:    Social History     Tobacco Use    Smoking status: Current Every Day Smoker     Packs/day: 0.25    Smokeless tobacco: Never Used   Substance Use Topics    Alcohol use: Not Currently     Comment: wine- rarely                                Ready to quit: Not Answered  Counseling given: Not Answered      Vital Signs (Current):   Vitals:    05/26/20 1134   Weight: 190 lb (86.2 kg)   Height: 5' 7\" (1.702 m)                                              BP Readings from Last 3 Encounters:   05/08/20 112/79   05/29/17 132/72       NPO Status: Time of last liquid consumption: 2330                        Time of last solid consumption: 2330                        Date of last liquid consumption: 05/26/20                        Date of last solid food consumption: 05/26/20    BMI:   Wt Readings from Last 3 Encounters:   05/26/20 190 lb (86.2 kg)   05/08/20 189 lb (85.7 kg)   05/27/17 225 lb (102.1 kg)     Body mass index is 29.76 kg/m². CBC:   Lab Results   Component Value Date    WBC 14.5 05/28/2017    RBC 3.04 05/28/2017    HGB 9.6 05/28/2017    HCT 28.7 05/28/2017    MCV 94.5 05/28/2017    RDW 14.0 05/28/2017     05/28/2017       CMP:   Lab Results   Component Value Date     05/29/2017    K 4.5 05/29/2017     05/29/2017    CO2 22 05/29/2017    BUN 39 05/29/2017    CREATININE 2.44 05/29/2017    GFRAA 35 05/29/2017    LABGLOM 29 05/29/2017    GLUCOSE 276 05/27/2017    CALCIUM 9.6 05/27/2017       POC Tests: No results for input(s): POCGLU, POCNA, POCK, POCCL, POCBUN, POCHEMO, POCHCT in the last 72 hours.     Coags: No results found for: PROTIME, INR, APTT    HCG (If Applicable): No results found for: PREGTESTUR, PREGSERUM, HCG, HCGQUANT     ABGs: No results found for: PHART, PO2ART, GOJ5TOO, DBV4ZYH, BEART, W9TUMLGB     Type & Screen (If Applicable):  No results found for: LABABO, LABRH    Drug/Infectious Status (If Applicable):  No results found for: HIV, HEPCAB    COVID-19 Screening (If Applicable):   Lab Results   Component Value Date    COVID19 Not Detected 05/25/2020         Anesthesia Evaluation  Patient summary reviewed no history of anesthetic complications:   Airway: Mallampati: II  TM distance: >3 FB   Neck ROM: full  Mouth opening: > = 3 FB Dental:          Pulmonary:Negative Pulmonary ROS and normal exam                               Cardiovascular:    (+) hypertension: no interval change,         Rhythm: regular  Rate: normal                    Neuro/Psych:   Negative Neuro/Psych ROS              GI/Hepatic/Renal:   (+) GERD:, renal disease: ESRD and dialysis,           Endo/Other:    (+) DiabetesType II DM, no interval change, , .                 Abdominal:

## 2020-05-28 ENCOUNTER — TELEPHONE (OUTPATIENT)
Dept: VASCULAR SURGERY | Age: 45
End: 2020-05-28

## 2020-05-28 NOTE — OP NOTE
it for a good segment, I then dissected deeper to the  antecubital sheath, identified the brachial artery and sharply dissected  out proximally and distally. Vessel loops were placed for control. The  patient was given 5000 units of heparin and allowed to circulate. The  distal end of the cephalic vein was then ligated with a silk tie and  transected. Branches were also ligated with silk ties. The vein had  good backbleeding and it flushed well. Yasargil clip was applied to  prevent further backbleeding. The vein was then spatulated. The  brachial artery was then controlled by pulling up on the vessel loops. Arteriotomy was created with an 11-blade and extended with Cook  scissors. An end-to-side anastomosis from the cephalic vein to the  brachial artery was performed with 7-0 Prolene. Prior to completion,  usual flushing maneuvers were performed, and the anastomosis was  hemostatic. There was a good thrill in the fistula as well as a  palpable radial artery pulse at the level of the wrist.  Wound bed was  irrigated and dried. It was hemostatic. Incision was then closed with  interrupted layers of Vicryl, followed by a running 5-0 Monocryl. Benzoin, Steri-Strip, and sterile dressing were applied. The patient  tolerated the procedure well and was brought to his room for recovery.         Lilian Olivares MD    D: 05/27/2020 21:29:07       T: 05/28/2020 0:51:49     [email or web address]  Job#: [id number]     Doc#: [id number]    CC:

## 2020-06-09 ENCOUNTER — TELEPHONE (OUTPATIENT)
Dept: VASCULAR SURGERY | Age: 45
End: 2020-06-09

## 2020-06-12 ENCOUNTER — TELEPHONE (OUTPATIENT)
Dept: VASCULAR SURGERY | Age: 45
End: 2020-06-12

## 2020-06-12 NOTE — TELEPHONE ENCOUNTER
----- Message from Mara Alcazar MD sent at 6/11/2020  3:58 PM EDT -----  No problem, he had a small hematoma at completion of procedure but I can see him on Monday  ----- Message -----  From: Lb Salazar MA  Sent: 6/11/2020   2:14 PM EDT  To: Mara Alcazar MD, #    RN Gricelda Silva calls from Dialysis stating pt has a good bruit/thrill. But at pts anastomosis there is a hard hematoma. Pt states sometimes it gets painful. COLE Silva states it looks like where the sutures were is going to open but it is still in tact. Dr. Rubina Camacho saw patient doing rounds today and states he would like him to be seen.

## 2020-06-15 ENCOUNTER — TELEPHONE (OUTPATIENT)
Dept: VASCULAR SURGERY | Age: 45
End: 2020-06-15

## 2020-06-22 ENCOUNTER — OFFICE VISIT (OUTPATIENT)
Dept: VASCULAR SURGERY | Age: 45
End: 2020-06-22

## 2020-06-22 VITALS
HEIGHT: 67 IN | SYSTOLIC BLOOD PRESSURE: 142 MMHG | RESPIRATION RATE: 16 BRPM | DIASTOLIC BLOOD PRESSURE: 83 MMHG | BODY MASS INDEX: 31.08 KG/M2 | HEART RATE: 74 BPM | WEIGHT: 198 LBS | TEMPERATURE: 98.5 F

## 2020-06-22 PROCEDURE — 99024 POSTOP FOLLOW-UP VISIT: CPT | Performed by: SURGERY

## 2020-06-22 ASSESSMENT — ENCOUNTER SYMPTOMS
COLOR CHANGE: 0
CHEST TIGHTNESS: 0
SHORTNESS OF BREATH: 0

## 2020-07-06 ENCOUNTER — HOSPITAL ENCOUNTER (OUTPATIENT)
Dept: PREADMISSION TESTING | Age: 45
Discharge: HOME | End: 2020-07-10
Payer: COMMERCIAL

## 2020-07-06 PROCEDURE — U0004 COV-19 TEST NON-CDC HGH THRU: HCPCS

## 2020-07-07 LAB
SARS-COV-2, PCR: NOT DETECTED
SARS-COV-2, RAPID: NORMAL
SARS-COV-2: NORMAL
SOURCE: NORMAL

## 2020-07-07 NOTE — PROGRESS NOTES
Spoke with [employer] and restrictions/one visitor policy reviewed, asked to bring medications with patient and drop off location. Informed of medications to take in am.  Questions answered.

## 2020-07-08 ENCOUNTER — HOSPITAL ENCOUNTER (OUTPATIENT)
Dept: CARDIAC CATH/INVASIVE PROCEDURES | Age: 45
Discharge: HOME | End: 2020-07-08
Payer: COMMERCIAL

## 2020-07-08 VITALS
SYSTOLIC BLOOD PRESSURE: 154 MMHG | TEMPERATURE: 97.4 F | DIASTOLIC BLOOD PRESSURE: 73 MMHG | OXYGEN SATURATION: 99 % | HEART RATE: 77 BPM | RESPIRATION RATE: 16 BRPM

## 2020-07-08 PROCEDURE — 2709999900 HC NON-CHARGEABLE SUPPLY

## 2020-07-08 PROCEDURE — 36832 AV FISTULA REVISION OPEN: CPT | Performed by: SURGERY

## 2020-07-08 PROCEDURE — 7100000001 HC PACU RECOVERY - ADDTL 15 MIN

## 2020-07-08 PROCEDURE — 6360000004 HC RX CONTRAST MEDICATION

## 2020-07-08 PROCEDURE — C1894 INTRO/SHEATH, NON-LASER: HCPCS

## 2020-07-08 PROCEDURE — 2500000003 HC RX 250 WO HCPCS

## 2020-07-08 PROCEDURE — 7100000000 HC PACU RECOVERY - FIRST 15 MIN

## 2020-07-08 NOTE — PROGRESS NOTES
Patient admitted, consent signed, all questions answered. Pt ready for procedure. Call light to reach with side rails up 2 of 2. Wife at bedside with patient.     Prep completed to Lt arm area with 2% Chlorhexidine Gluconate

## 2020-07-08 NOTE — PROGRESS NOTES
Sipping on apple juice. All discharge instructions reviewed, questions answered, paper signed and given copy. Patient discharged per ambulatory with wife and belongings.

## 2020-07-08 NOTE — H&P
Division of Vascular Surgery        H&P Update    Patient's Office Note from 6/22/2020 was reviewed. He has started dialysis thru a tunneled catheter since seeing me. Plan to proceed with LUE AV fistula revision. .      Electronically signed by Ryanne Perdomo MD on 7/8/20 at 8:26 AM EDT      [address] [facility],[address]: [phone]  C: [phone]  Email: [email or web address]    History of Present Illness:      Kristin Anderson is a 53 y.o. gentleman presents for postoperative follow up after undergoing AV fistula creation. He had developed some swelling around his incision right after surgery, this has resolved. He denies any pain, weakness, numbness/tingling in his hand to suggest ischemic steal.  He has some numbness/tingling just lateral to his incision and down his upper part of the forearm.      Review of Systems:      Review of Systems   Constitutional: Negative for chills and fever. Respiratory: Negative for chest tightness and shortness of breath. Cardiovascular: Negative for chest pain. Musculoskeletal: Negative. Skin: Negative for color change and wound. Neurological: Positive for numbness. Negative for weakness.         Physical Exam:      Vitals:  BP (!) 142/83 (Site: Right Upper Arm, Position: Sitting, Cuff Size: Medium Adult)   Pulse 74   Temp 98.5 °F (36.9 °C) (Temporal)   Resp 16   Ht 5' 7\" (1.702 m)   Wt 198 lb (89.8 kg)   BMI 31.01 kg/m²      Physical Exam  Constitutional:       Appearance: He is well-developed and well-groomed. Cardiovascular:      Rate and Rhythm: Normal rate and regular rhythm. Pulses:           Radial pulses are 2+ on the left side. Arteriovenous access: left arteriovenous access is present. Comments: Good thrill over fistula, dampened up the arm  Pulmonary:      Effort: Pulmonary effort is normal. No respiratory distress.    Chest:      Comments: Tunneled catheter in place  Musculoskeletal:      Left upper arm: He exhibits no tenderness and no swelling. Left hand: He exhibits no tenderness and normal capillary refill. Normal sensation noted. Normal strength noted. Skin:     General: Skin is warm. Capillary Refill: Capillary refill takes less than 2 seconds. Comments: Incision well healed, no erythema/drainage   Neurological:      Mental Status: He is alert and oriented to person, place, and time. GCS: GCS eye subscore is 4. GCS verbal subscore is 5. GCS motor subscore is 6. Sensory: Sensation is intact. Motor: Motor function is intact.       Imaging/Labs:      Ultrasound performed in the office reveals good size cephalic vein fistula, large branch proximally just above AC fossa     Assessment and Plan:      ESRD on hemodialysis, s/p LUE AV fistula creation  ? Discussed with him needing left upper extremity AV fistula revision to help mature the fistula  ? Risks and benefits explained and he consented to proceed  ?  Ok to use tunneled catheter for IV access

## 2020-07-08 NOTE — PROGRESS NOTES
Returned from procedure. No c/o discomforts. Small bandaid over Lt A/C dry and intact.     Dr. Andersen Quiet in to see patient and wife

## 2020-07-08 NOTE — BRIEF OP NOTE
Brief Postoperative Note      Patient: Chanel Lubin  YOB: 1975  MRN: [id number]    Date of Procedure: 7/8/2020    Pre-Op Diagnosis: ESRD on hemodialysis, malfunctioning AV fistula    Post-Op Diagnosis: Same       Procedure:  1) US guided vascular access of LUE AV fistula  2) Fistulagram  3) AV fistula revision, branch ligation    Surgeon: Dr. Christie Velasquez    Assistant:  Chrissy Saeed    Anesthesia: Local    Estimated Blood Loss (mL): 3 ml    Complications: None    Specimens: none    Implants: none      Drains: none    Findings: Large branch stealing from fistula, improved thrill at completion. No central venous stenosis.     Electronically signed by Olesya Silva MD on 7/8/2020 at 9:22 AM

## 2020-07-09 NOTE — OP NOTE
[address] [facility]                                OPERATIVE REPORT    PATIENT NAME: Mabel Tijerina                      :        1975  MED REC NO:   [id number]                             ROOM:  ACCOUNT NO:   [de-identified]                           ADMIT DATE: 2020  PROVIDER:     Sy Queen MD    DATE OF PROCEDURE:  2020    PREOPERATIVE DIAGNOSES:  End-stage renal disease, on hemodialysis;  malfunctioning left upper extremity arteriovenous fistula. POSTOPERATIVE DIAGNOSES:  End-stage renal disease, on hemodialysis;  malfunctioning left upper extremity arteriovenous fistula. PROCEDURES:  1. Ultrasound-guided vascular access of left upper extremity  arteriovenous fistula. 2.  Fistulogram.  3.  Arteriovenous fistula revision with branch ligation. SURGEON:  Sy Queen MD    ASSISTANT:  Matheus Petersen DO    ANESTHESIA:  Local.    ESTIMATED BLOOD LOSS:  3 mL. COMPLICATIONS:  None. SPECIMENS:  None. IMPLANTS:  None. DRAINS:  None. FINDINGS:  Large branch stealing from the fistula, improved thrill at  completion. No central venous stenosis. INDICATION FOR PROCEDURE:  The patient is a 75-year-old gentleman who  underwent a left upper extremity arteriovenous fistula creation. He was  seen in the office. There was concern for a large branch that was  stealing and preventing full maturation of the fistula. He was  recommended to undergo a fistulogram for evaluation with possible  revision. Risks and benefits were explained and he consented to  proceed. DESCRIPTION OF PROCEDURE:  The patient was brought to the  catheterization suite. After appropriate time-out was performed, the  left upper extremity was prepped and draped in a standard sterile  fashion. I began by evaluating the fistula under ultrasound. It was  patent and compressible.   Local anesthetic was delivered,

## 2020-07-13 ENCOUNTER — TELEPHONE (OUTPATIENT)
Dept: VASCULAR SURGERY | Age: 45
End: 2020-07-13

## 2020-07-13 NOTE — TELEPHONE ENCOUNTER
----- Message from Dash Burris MD sent at 7/8/2020  9:25 AM EDT -----  1 month follow up s/p ZEBE AV fistula revision.     Please let dialysis center know they can access fistula next week with one and then 2 needles    Make sure we can remove his catheter before he comes in    thanks

## 2020-07-13 NOTE — TELEPHONE ENCOUNTER
Notified Brina Noriega at dialysis of message below. Letter faxed. Brina Sessions requested that patient's cath be removed prior to 7/31. Patient will be scheduled then for removal, but Brina Sessions to notify office of any concerns.

## 2020-08-03 ENCOUNTER — OFFICE VISIT (OUTPATIENT)
Dept: VASCULAR SURGERY | Age: 45
End: 2020-08-03
Payer: COMMERCIAL

## 2020-08-03 VITALS
RESPIRATION RATE: 18 BRPM | OXYGEN SATURATION: 99 % | HEIGHT: 67 IN | WEIGHT: 204 LBS | SYSTOLIC BLOOD PRESSURE: 136 MMHG | DIASTOLIC BLOOD PRESSURE: 83 MMHG | BODY MASS INDEX: 32.02 KG/M2 | HEART RATE: 91 BPM | TEMPERATURE: 98.1 F

## 2020-08-03 PROCEDURE — 99024 POSTOP FOLLOW-UP VISIT: CPT | Performed by: SURGERY

## 2020-08-03 PROCEDURE — 36589 REMOVAL TUNNELED CV CATH: CPT | Performed by: SURGERY

## 2020-10-15 ENCOUNTER — TELEPHONE (OUTPATIENT)
Dept: VASCULAR SURGERY | Age: 45
End: 2020-10-15

## 2020-10-15 NOTE — TELEPHONE ENCOUNTER
RN S [facility] calls from pts Dialysis stating that his bruit and thrill are very faint. Pt is not running right now. They chose not to cannulate him today d/t this.

## 2020-10-22 ENCOUNTER — TELEPHONE (OUTPATIENT)
Dept: VASCULAR SURGERY | Age: 45
End: 2020-10-22

## 2020-10-22 NOTE — TELEPHONE ENCOUNTER
Marylee Borg from [employer] called stating that patient had been sent to IR last week to have his fistular declotted. She states that patient access is not running right and would like to have his fistula checked. Patient has upcomming appointment on 10/30.

## 2020-10-29 ENCOUNTER — TELEPHONE (OUTPATIENT)
Dept: VASCULAR SURGERY | Age: 45
End: 2020-10-29

## 2020-10-29 NOTE — TELEPHONE ENCOUNTER
Patient scheduled for 2:45pm tomorrow 10/30/20 with Dr. Keya Raman. Contacted patient to move patient to earlier time slot due to Dr. Keya Raman needing to be done with clinic for surgery in the afternoon. Requested that patient call office back to move appointment. Patient's appointment moved to 1:15pm from 2:45p.

## 2020-10-30 ENCOUNTER — OFFICE VISIT (OUTPATIENT)
Dept: VASCULAR SURGERY | Age: 45
End: 2020-10-30
Payer: COMMERCIAL

## 2020-10-30 VITALS
SYSTOLIC BLOOD PRESSURE: 124 MMHG | OXYGEN SATURATION: 98 % | HEART RATE: 92 BPM | WEIGHT: 204 LBS | DIASTOLIC BLOOD PRESSURE: 83 MMHG | RESPIRATION RATE: 16 BRPM | TEMPERATURE: 97.8 F | BODY MASS INDEX: 32.02 KG/M2 | HEIGHT: 67 IN

## 2020-10-30 PROCEDURE — 4004F PT TOBACCO SCREEN RCVD TLK: CPT | Performed by: SURGERY

## 2020-10-30 PROCEDURE — G8484 FLU IMMUNIZE NO ADMIN: HCPCS | Performed by: SURGERY

## 2020-10-30 PROCEDURE — 99214 OFFICE O/P EST MOD 30 MIN: CPT | Performed by: SURGERY

## 2020-10-30 PROCEDURE — G8417 CALC BMI ABV UP PARAM F/U: HCPCS | Performed by: SURGERY

## 2020-10-30 PROCEDURE — G8427 DOCREV CUR MEDS BY ELIG CLIN: HCPCS | Performed by: SURGERY

## 2020-10-31 ASSESSMENT — ENCOUNTER SYMPTOMS
ALLERGIC/IMMUNOLOGIC NEGATIVE: 1
ABDOMINAL PAIN: 0
COLOR CHANGE: 0
CHEST TIGHTNESS: 0
SHORTNESS OF BREATH: 0

## 2020-10-31 NOTE — PROGRESS NOTES
Division of Vascular Surgery        Follow Up      Left brachial cephalic AV fistula creation (5/27/20)  Left AV fistula revision and fistulagram (7/8/2020)    Chief Complaint:      Malfunctioning LUE AV fistula    History of Present Illness:      Tena Rojas is a 53 y.o. gentleman who presents for evaluation of his fistula. Several weeks ago he was at an outpatient access center and had a fistulagram done. Although dialysis has been able to cannulate him they are reporting issues with his fistula while running him. He denies any pain, weakness, numbness/tingling to suggest ischemic steal.  He denies prolong bleeding after the needles are removed.     Medical History:     Past Medical History:   Diagnosis Date    Abscess, gluteal, left     Diabetes mellitus ([facility]     Dr. Duran Lisa End stage renal disease ([facility]     All's gangrene of scrotum     including penis    GERD (gastroesophageal reflux disease)     Hemodialysis patient ([facility] [address]     [facility], [phone], T-TH-SAT     Hyperlipidemia     Hypertension     Dr. Selene Mosqueda    Presence of permanent central venous catheter     dialysis catheter, right side of chest       Surgical History:     Past Surgical History:   Procedure Laterality Date    AV FISTULA CREATION  05/27/2020     LEFT UPPER EXTREMITY AV FISTULA CREATION (Left )    CHOLECYSTECTOMY      DIALYSIS FISTULA CREATION Left 5/27/2020    LEFT UPPER EXTREMITY AV FISTULA CREATION performed by Renee Titus MD at [address] Bilateral 10/11/2019    INCISION AND DRAINAGE SCROTUM    SCROTAL SURGERY Bilateral 10/15/2019    INCISION AND DRAINAGE SCROTUM    SCROTAL SURGERY      several wound vac changes to scrotal groin area     TESTICLE REMOVAL Right 10/18/2019    EXCISION ABSCESS SCROTAL SCROTAL ABSCESS DEBRIDEMENT AND WOUND VAC APPLICATION, RIGHT ORCHIECTOMY;    TUNNELED VENOUS CATHETER [address] [facility] , dialysis catheter       Family abdominal pain. Endocrine: Negative. Genitourinary: Negative. Musculoskeletal: Negative. Skin: Negative for color change and wound. Allergic/Immunologic: Negative. Neurological: Negative for facial asymmetry, speech difficulty, weakness and numbness. Hematological: Negative. Psychiatric/Behavioral: Negative. Physical Exam:     Vitals:  /83 (Site: Right Upper Arm, Position: Sitting, Cuff Size: Medium Adult)   Pulse 92   Temp 97.8 °F (36.6 °C) (Temporal)   Resp 16   Ht 5' 7\" (1.702 m)   Wt 204 lb (92.5 kg)   SpO2 98%   BMI 31.95 kg/m²     Physical Exam  Constitutional:       Appearance: He is well-developed and well-groomed. Eyes:      Extraocular Movements: Extraocular movements intact. Conjunctiva/sclera: Conjunctivae normal.   Neck:      Musculoskeletal: Full passive range of motion without pain. Vascular: No carotid bruit. Cardiovascular:      Rate and Rhythm: Normal rate and regular rhythm. Pulses:           Radial pulses are 2+ on the left side. Arteriovenous access: left arteriovenous access is present. Comments: Good thrill proximally in the fistula, somewhat dampened in the upper portion  Pulmonary:      Effort: Pulmonary effort is normal. No respiratory distress. Abdominal:      Palpations: Abdomen is soft. Tenderness: There is no abdominal tenderness. Musculoskeletal:      Left upper arm: He exhibits no tenderness and no swelling. Left hand: He exhibits no tenderness and normal capillary refill. Normal sensation noted. Normal strength noted. Right lower leg: No edema. Left lower leg: No edema. Skin:     General: Skin is warm. Capillary Refill: Capillary refill takes less than 2 seconds. Neurological:      Mental Status: He is alert and oriented to person, place, and time. GCS: GCS eye subscore is 4. GCS verbal subscore is 5. GCS motor subscore is 6. Sensory: Sensation is intact.       Motor: Motor function is intact. Psychiatric:         Mood and Affect: Mood normal.         Speech: Speech normal.         Behavior: Behavior normal.         Thought Content: Thought content normal.       Imaging/Labs:     None performed    Assessment and Plan:     ESRD on hemodialysis, malfunctioning AV fistula  · Discussed performing fistulagram to evaluate  · Will clarify with dialysis center to find out exactly what is going on and what there concerns are    Electronically signed by Mari Cornejo MD on 10/31/20 at 10:46 AM EDT      [address] [facility],[address]: [phone]  C: [phone]  Email: [email or web address]

## 2020-11-11 NOTE — PROGRESS NOTES
Pre procedure call attempted but no answer.   Message left RE: visitor and mask policy, bring meds in original bottles and to not take diabetes meds in AM.

## 2020-11-12 ENCOUNTER — HOSPITAL ENCOUNTER (OUTPATIENT)
Dept: CARDIAC CATH/INVASIVE PROCEDURES | Age: 45
Discharge: HOME | End: 2020-11-12
Payer: COMMERCIAL

## 2020-11-12 VITALS
TEMPERATURE: 98.1 F | SYSTOLIC BLOOD PRESSURE: 119 MMHG | WEIGHT: 204 LBS | BODY MASS INDEX: 32.02 KG/M2 | RESPIRATION RATE: 18 BRPM | OXYGEN SATURATION: 99 % | HEART RATE: 80 BPM | HEIGHT: 67 IN | DIASTOLIC BLOOD PRESSURE: 73 MMHG

## 2020-11-12 LAB — GLUCOSE BLD-MCNC: 160 MG/DL (ref 75–110)

## 2020-11-12 PROCEDURE — 7100000000 HC PACU RECOVERY - FIRST 15 MIN

## 2020-11-12 PROCEDURE — 2500000003 HC RX 250 WO HCPCS

## 2020-11-12 PROCEDURE — 36901 INTRO CATH DIALYSIS CIRCUIT: CPT | Performed by: SURGERY

## 2020-11-12 PROCEDURE — 7100000001 HC PACU RECOVERY - ADDTL 15 MIN

## 2020-11-12 PROCEDURE — 76937 US GUIDE VASCULAR ACCESS: CPT | Performed by: SURGERY

## 2020-11-12 PROCEDURE — 6360000004 HC RX CONTRAST MEDICATION

## 2020-11-12 PROCEDURE — 82947 ASSAY GLUCOSE BLOOD QUANT: CPT

## 2020-11-12 PROCEDURE — C1894 INTRO/SHEATH, NON-LASER: HCPCS

## 2020-11-12 PROCEDURE — 2709999900 HC NON-CHARGEABLE SUPPLY

## 2020-11-12 NOTE — PROGRESS NOTES
Patient admitted, consent signed and questions answered. Patient ready for procedure. Call light to reach with side rails up 2 of 2. Wife at bedside with patient. History and physical needing update.   2% Chlorhexidine cloths used to prep site / left arm

## 2020-11-12 NOTE — PROGRESS NOTES
Patient arrives back to room 5 alert and awake with no sedation used at 11:15am.  Band aid clean and dry to left upper forearm. Discharge instructions reviewed with patient and wife. Juice offered. All discharge instructions reviewed, questions answered, paper signed and given copy. Patient discharged per ambulation with wife and belongings.

## 2020-11-12 NOTE — H&P
Division of Vascular Surgery        H&P Update    Patient's Office Note from 10/31/2020 was reviewed. There are no  changes today, consent obtained     Plan to proceed with fistulagram       Electronically signed by JONO Mcmullen NP on 11/12/20 at 9:19 AM EST      [address] [place] [address],[address]: [phone]  C: [phone]  Email: [email or web address]      Chief Complaint:       Malfunctioning LUE AV fistula     History of Present Illness:      Wayne Olivia is a 53 y.o. gentleman who presents for evaluation of his fistula. Several weeks ago he was at an outpatient access center and had a fistulagram done. Although dialysis has been able to cannulate him they are reporting issues with his fistula while running him.   He denies any pain, weakness, numbness/tingling to suggest ischemic steal.  He denies prolong bleeding after the needles are removed.     Medical History:      Past Medical History        Past Medical History:   Diagnosis Date    Abscess, gluteal, left      Diabetes mellitus (HCC)       Dr. Sherron Matos End stage renal disease ([facility] [address]      All's gangrene of scrotum       including penis    GERD (gastroesophageal reflux disease)      Hemodialysis patient [facility])       [facility], [facility], [phone], T-TH-SAT     Hyperlipidemia      Hypertension       Dr. Nicolle Pisano    Presence of permanent central venous catheter       dialysis catheter, right side of chest           Surgical History:      Past Surgical History         Past Surgical History:   Procedure Laterality Date    AV FISTULA CREATION   05/27/2020      LEFT UPPER EXTREMITY AV FISTULA CREATION (Left )    CHOLECYSTECTOMY        DIALYSIS FISTULA CREATION Left 5/27/2020     LEFT UPPER EXTREMITY AV FISTULA CREATION [facility] Bilateral 10/11/2019     INCISION AND DRAINAGE SCROTUM    SCROTAL SURGERY Bilateral 10/15/2019     INCISION AND DRAINAGE SCROTUM    SCROTAL SURGERY         several wound vac changes to scrotal groin area     TESTICLE REMOVAL Right 10/18/2019     EXCISION ABSCESS SCROTAL SCROTAL ABSCESS DEBRIDEMENT AND WOUND VAC APPLICATION, RIGHT ORCHIECTOMY;    TUNNELED VENOUS CATHETER PLACEMENT         [facility], dialysis catheter           Family History:      Family History         Family History   Problem Relation Age of Onset    Diabetes Mother      High Blood Pressure Mother      Diabetes Father      Cancer Father      High Blood Pressure Father      Diabetes Paternal Grandmother      Diabetes Paternal Grandfather      Cancer Paternal Grandfather             Allergies:       Patient has no known allergies.     Medications:      Current Facility-Administered Medications          Current Outpatient Medications   Medication Sig Dispense Refill    aspirin (ASPIRIN CHILDRENS) 81 MG chewable tablet Take 1 tablet by mouth daily 30 tablet 3    B Complex CAPS TAKE 1 CAPSULE BY MOUTH EVERY DAY        carvedilol (COREG) 6.25 MG tablet TAKE 1 TABLET BY MOUTH TWICE A DAY        gabapentin (NEURONTIN) 100 MG capsule daily.         insulin aspart (NOVOLOG FLEXPEN) 100 UNIT/ML injection pen Novolog Flexpen U-100 Insulin aspart 100 unit/mL (3 mL) subcutaneous        LEVEMIR FLEXTOUCH 100 UNIT/ML injection pen INJECT 10 UNIT(S) TWICE A DAY BY SUBCUTANEOUS ROUTE.        magnesium oxide (MAG-OX) 400 MG tablet TAKE 1 TABLET BY MOUTH EVERY DAY        pantoprazole (PROTONIX) 40 MG tablet pantoprazole 40 mg tablet,delayed release        VELPHORO 500 MG CHEW CRUSH OR CHEW AND SWALLOW 1 TABLET 3 TIMES A DAY WITH MEALS        amLODIPine (NORVASC) 10 MG tablet Take 1 tablet by mouth daily Further refills by PCP 30 tablet 0      No current facility-administered medications for this visit.            Social History:      Tobacco:    reports that he has been smoking. He has been smoking about 0.25 packs per day.  He has never used smokeless tobacco.  Alcohol:      reports previous alcohol use. Drug Use:  reports current drug use. Drug: Marijuana.     Review of Systems:      Review of Systems   Constitutional: Negative for chills and fever. HENT: Negative for congestion. Eyes: Negative for visual disturbance. Respiratory: Negative for chest tightness and shortness of breath. Cardiovascular: Negative for chest pain and leg swelling. Gastrointestinal: Negative for abdominal pain. Endocrine: Negative. Genitourinary: Negative. Musculoskeletal: Negative. Skin: Negative for color change and wound. Allergic/Immunologic: Negative. Neurological: Negative for facial asymmetry, speech difficulty, weakness and numbness. Hematological: Negative. Psychiatric/Behavioral: Negative.          Physical Exam:      Vitals:  /83 (Site: Right Upper Arm, Position: Sitting, Cuff Size: Medium Adult)   Pulse 92   Temp 97.8 °F (36.6 °C) (Temporal)   Resp 16   Ht 5' 7\" (1.702 m)   Wt 204 lb (92.5 kg)   SpO2 98%   BMI 31.95 kg/m²      Physical Exam  Constitutional:       Appearance: He is well-developed and well-groomed. Eyes:      Extraocular Movements: Extraocular movements intact. Conjunctiva/sclera: Conjunctivae normal.   Neck:      Musculoskeletal: Full passive range of motion without pain. Vascular: No carotid bruit. Cardiovascular:      Rate and Rhythm: Normal rate and regular rhythm. Pulses:           Radial pulses are 2+ on the left side. Arteriovenous access: left arteriovenous access is present. Comments: Good thrill proximally in the fistula, somewhat dampened in the upper portion  Pulmonary:      Effort: Pulmonary effort is normal. No respiratory distress. Abdominal:      Palpations: Abdomen is soft. Tenderness: There is no abdominal tenderness. Musculoskeletal:      Left upper arm: He exhibits no tenderness and no swelling.       Left hand: He exhibits no tenderness and normal capillary refill. Normal sensation noted. Normal strength noted. Right lower leg: No edema. Left lower leg: No edema. Skin:     General: Skin is warm. Capillary Refill: Capillary refill takes less than 2 seconds. Neurological:      Mental Status: He is alert and oriented to person, place, and time. GCS: GCS eye subscore is 4. GCS verbal subscore is 5. GCS motor subscore is 6. Sensory: Sensation is intact. Motor: Motor function is intact. Psychiatric:         Mood and Affect: Mood normal.         Speech: Speech normal.         Behavior: Behavior normal.         Thought Content: Thought content normal.         Imaging/Labs:      None performed     Assessment and Plan:      ESRD on hemodialysis, malfunctioning AV fistula  ? Discussed performing fistulagram to evaluate  ? Will clarify with dialysis center to find out exactly what is going on and what there concerns are     Electronically signed by Ema Cheung MD on 10/31/20 at 10:46 AM CASPER Fernandez: [phone]  C: [phone]  Email: [email or web address]                   Office Visit on 10/30/2020

## 2020-11-13 ENCOUNTER — TELEPHONE (OUTPATIENT)
Dept: VASCULAR SURGERY | Age: 45
End: 2020-11-13

## 2020-11-13 NOTE — TELEPHONE ENCOUNTER
----- Message from Vikas Thornton MD sent at 11/12/2020 11:08 AM EST -----  Fistulagram done, no issues, well matured. Suspect low pressures may be related to his low blood pressure during dialysis. They should consider starting him on something to improve his blood pressure during treatments if they continue to have issues with low pressures during dialysis.

## 2023-07-24 ENCOUNTER — HOSPITAL ENCOUNTER (OUTPATIENT)
Age: 48
Setting detail: SPECIMEN
Discharge: HOME | End: 2023-07-24

## 2023-07-24 LAB
ANION GAP SERPL CALCULATED.3IONS-SCNC: 14 MMOL/L (ref 9–17)
BUN SERPL-MCNC: 35 MG/DL (ref 6–20)
BUN/CREAT SERPL: 6 (ref 9–20)
CALCIUM SERPL-MCNC: 10 MG/DL (ref 8.6–10.4)
CHLORIDE SERPL-SCNC: 87 MMOL/L (ref 98–107)
CO2 SERPL-SCNC: 27 MMOL/L (ref 20–31)
CREAT SERPL-MCNC: 6 MG/DL (ref 0.7–1.2)
ERYTHROCYTE [DISTWIDTH] IN BLOOD BY AUTOMATED COUNT: 21.4 % (ref 11.8–14.4)
GFR SERPL CREATININE-BSD FRML MDRD: 11 ML/MIN/1.73M2
GLUCOSE SERPL-MCNC: 112 MG/DL (ref 70–99)
HBV SURFACE AB SERPL IA-ACNC: >1000 MIU/ML
HBV SURFACE AG SERPL QL IA: NONREACTIVE
HCT VFR BLD AUTO: 32.8 % (ref 40.7–50.3)
HGB BLD-MCNC: 9.9 G/DL (ref 13–17)
MAGNESIUM SERPL-MCNC: 2.3 MG/DL (ref 1.6–2.6)
MCH RBC QN AUTO: 29.4 PG (ref 25.2–33.5)
MCHC RBC AUTO-ENTMCNC: 30.2 G/DL (ref 28.4–34.8)
MCV RBC AUTO: 97.3 FL (ref 82.6–102.9)
NRBC BLD-RTO: 0 PER 100 WBC
PHOSPHATE SERPL-MCNC: 5.1 MG/DL (ref 2.5–4.5)
PLATELET # BLD AUTO: 359 K/UL (ref 138–453)
PMV BLD AUTO: 9.2 FL (ref 8.1–13.5)
POTASSIUM SERPL-SCNC: 4.8 MMOL/L (ref 3.7–5.3)
RBC # BLD AUTO: 3.37 M/UL (ref 4.21–5.77)
SODIUM SERPL-SCNC: 128 MMOL/L (ref 135–144)
WBC OTHER # BLD: 12.5 K/UL (ref 3.5–11.3)

## 2023-07-24 PROCEDURE — 85027 COMPLETE CBC AUTOMATED: CPT

## 2023-07-24 PROCEDURE — 87340 HEPATITIS B SURFACE AG IA: CPT

## 2023-07-24 PROCEDURE — 83735 ASSAY OF MAGNESIUM: CPT

## 2023-07-24 PROCEDURE — 86317 IMMUNOASSAY INFECTIOUS AGENT: CPT

## 2023-07-24 PROCEDURE — 36415 COLL VENOUS BLD VENIPUNCTURE: CPT

## 2023-07-24 PROCEDURE — P9603 ONE-WAY ALLOW PRORATED MILES: HCPCS

## 2023-07-24 PROCEDURE — 84100 ASSAY OF PHOSPHORUS: CPT

## 2023-07-24 PROCEDURE — 80048 BASIC METABOLIC PNL TOTAL CA: CPT

## 2023-07-26 ENCOUNTER — HOSPITAL ENCOUNTER (OUTPATIENT)
Age: 48
Setting detail: SPECIMEN
Discharge: HOME | End: 2023-07-26

## 2023-07-26 LAB
ANION GAP SERPL CALCULATED.3IONS-SCNC: 12 MMOL/L (ref 9–17)
BUN SERPL-MCNC: 39 MG/DL (ref 6–20)
BUN/CREAT SERPL: 6 (ref 9–20)
CALCIUM SERPL-MCNC: 9.5 MG/DL (ref 8.6–10.4)
CHLORIDE SERPL-SCNC: 92 MMOL/L (ref 98–107)
CO2 SERPL-SCNC: 29 MMOL/L (ref 20–31)
CREAT SERPL-MCNC: 6.3 MG/DL (ref 0.7–1.2)
ERYTHROCYTE [DISTWIDTH] IN BLOOD BY AUTOMATED COUNT: 20.9 % (ref 11.8–14.4)
GFR SERPL CREATININE-BSD FRML MDRD: 10 ML/MIN/1.73M2
GLUCOSE SERPL-MCNC: 171 MG/DL (ref 70–99)
HCT VFR BLD AUTO: 32.1 % (ref 40.7–50.3)
HGB BLD-MCNC: 9.9 G/DL (ref 13–17)
MAGNESIUM SERPL-MCNC: 2.3 MG/DL (ref 1.6–2.6)
MCH RBC QN AUTO: 29.7 PG (ref 25.2–33.5)
MCHC RBC AUTO-ENTMCNC: 30.8 G/DL (ref 28.4–34.8)
MCV RBC AUTO: 96.4 FL (ref 82.6–102.9)
NRBC BLD-RTO: 0 PER 100 WBC
PHOSPHATE SERPL-MCNC: 4.5 MG/DL (ref 2.5–4.5)
PLATELET # BLD AUTO: 402 K/UL (ref 138–453)
PMV BLD AUTO: 8.9 FL (ref 8.1–13.5)
POTASSIUM SERPL-SCNC: 5.3 MMOL/L (ref 3.7–5.3)
RBC # BLD AUTO: 3.33 M/UL (ref 4.21–5.77)
SODIUM SERPL-SCNC: 133 MMOL/L (ref 135–144)
WBC OTHER # BLD: 11.4 K/UL (ref 3.5–11.3)

## 2023-07-26 PROCEDURE — 36415 COLL VENOUS BLD VENIPUNCTURE: CPT

## 2023-07-26 PROCEDURE — 85027 COMPLETE CBC AUTOMATED: CPT

## 2023-07-26 PROCEDURE — 80048 BASIC METABOLIC PNL TOTAL CA: CPT

## 2023-07-26 PROCEDURE — P9603 ONE-WAY ALLOW PRORATED MILES: HCPCS

## 2023-07-26 PROCEDURE — 83735 ASSAY OF MAGNESIUM: CPT

## 2023-07-26 PROCEDURE — 84100 ASSAY OF PHOSPHORUS: CPT

## 2023-10-01 ENCOUNTER — APPOINTMENT (OUTPATIENT)
Dept: CT IMAGING | Age: 48
End: 2023-10-01

## 2023-10-01 ENCOUNTER — HOSPITAL ENCOUNTER (EMERGENCY)
Age: 48
Discharge: ANOTHER HOSPITAL | End: 2023-10-01
Attending: STUDENT IN AN ORGANIZED HEALTH CARE EDUCATION/TRAINING PROGRAM

## 2023-10-01 VITALS
HEIGHT: 67 IN | HEART RATE: 108 BPM | WEIGHT: 160 LBS | BODY MASS INDEX: 25.11 KG/M2 | OXYGEN SATURATION: 98 % | DIASTOLIC BLOOD PRESSURE: 82 MMHG | RESPIRATION RATE: 20 BRPM | TEMPERATURE: 99 F | SYSTOLIC BLOOD PRESSURE: 141 MMHG

## 2023-10-01 DIAGNOSIS — K92.2 UPPER GI BLEED: Primary | ICD-10-CM

## 2023-10-01 LAB
ABO + RH BLD: NORMAL
ALBUMIN SERPL-MCNC: 3.7 G/DL (ref 3.5–5.2)
ALBUMIN/GLOB SERPL: 0.9 {RATIO} (ref 1–2.5)
ALP SERPL-CCNC: 363 U/L (ref 40–129)
ALT SERPL-CCNC: 8 U/L (ref 5–41)
ANION GAP SERPL CALCULATED.3IONS-SCNC: 11 MMOL/L (ref 9–17)
ARM BAND NUMBER: NORMAL
AST SERPL-CCNC: 18 U/L
BASOPHILS # BLD: 0 K/UL (ref 0–0.2)
BASOPHILS NFR BLD: 0 % (ref 0–2)
BILIRUB SERPL-MCNC: 0.3 MG/DL (ref 0.3–1.2)
BLOOD BANK SAMPLE EXPIRATION: NORMAL
BLOOD GROUP ANTIBODIES SERPL: NEGATIVE
BUN SERPL-MCNC: 26 MG/DL (ref 6–20)
CALCIUM SERPL-MCNC: 12.1 MG/DL (ref 8.6–10.4)
CHLORIDE SERPL-SCNC: 92 MMOL/L (ref 98–107)
CO2 SERPL-SCNC: 30 MMOL/L (ref 20–31)
CREAT SERPL-MCNC: 4 MG/DL (ref 0.7–1.2)
EOSINOPHIL # BLD: 0.08 K/UL (ref 0–0.4)
EOSINOPHILS RELATIVE PERCENT: 1 % (ref 1–4)
ERYTHROCYTE [DISTWIDTH] IN BLOOD BY AUTOMATED COUNT: 18.4 % (ref 12.5–15.4)
GFR SERPL CREATININE-BSD FRML MDRD: 17 ML/MIN/1.73M2
GLUCOSE SERPL-MCNC: 87 MG/DL (ref 70–99)
HCT VFR BLD AUTO: 30.9 % (ref 41–53)
HGB BLD-MCNC: 9.4 G/DL (ref 13.5–17.5)
INR PPP: 1
LACTATE BLDV-SCNC: 0.8 MMOL/L (ref 0.5–1.9)
LIPASE SERPL-CCNC: 13 U/L (ref 13–60)
LYMPHOCYTES NFR BLD: 1.25 K/UL (ref 1–4.8)
LYMPHOCYTES RELATIVE PERCENT: 16 % (ref 24–44)
MCH RBC QN AUTO: 31.9 PG (ref 26–34)
MCHC RBC AUTO-ENTMCNC: 30.4 G/DL (ref 31–37)
MCV RBC AUTO: 104.7 FL (ref 80–100)
MICROORGANISM SPEC CULT: NORMAL
MONOCYTES NFR BLD: 0.94 K/UL (ref 0.1–0.8)
MONOCYTES NFR BLD: 12 % (ref 1–7)
MORPHOLOGY: ABNORMAL
MORPHOLOGY: ABNORMAL
NEUTROPHILS NFR BLD: 71 % (ref 36–66)
NEUTS SEG NFR BLD: 5.53 K/UL (ref 1.8–7.7)
PARTIAL THROMBOPLASTIN TIME: 31.4 SEC (ref 21.3–31.3)
PLATELET # BLD AUTO: 353 K/UL (ref 140–450)
PMV BLD AUTO: 9.5 FL (ref 8–14)
POTASSIUM SERPL-SCNC: 4.4 MMOL/L (ref 3.7–5.3)
PROT SERPL-MCNC: 7.9 G/DL (ref 6.4–8.3)
PROTHROMBIN TIME: 10.8 SEC (ref 9.4–12.6)
RBC # BLD AUTO: 2.95 M/UL (ref 4.5–5.9)
SARS-COV-2 RDRP RESP QL NAA+PROBE: NOT DETECTED
SODIUM SERPL-SCNC: 133 MMOL/L (ref 135–144)
SPECIMEN DESCRIPTION: NORMAL
SPECIMEN DESCRIPTION: NORMAL
TROPONIN I SERPL HS-MCNC: 556 NG/L (ref 0–22)
TROPONIN I SERPL HS-MCNC: 622 NG/L (ref 0–22)
WBC OTHER # BLD: 7.8 K/UL (ref 3.5–11)

## 2023-10-01 PROCEDURE — 6360000002 HC RX W HCPCS: Performed by: STUDENT IN AN ORGANIZED HEALTH CARE EDUCATION/TRAINING PROGRAM

## 2023-10-01 PROCEDURE — 87635 SARS-COV-2 COVID-19 AMP PRB: CPT

## 2023-10-01 PROCEDURE — 84484 ASSAY OF TROPONIN QUANT: CPT

## 2023-10-01 PROCEDURE — 83690 ASSAY OF LIPASE: CPT

## 2023-10-01 PROCEDURE — 85025 COMPLETE CBC W/AUTO DIFF WBC: CPT

## 2023-10-01 PROCEDURE — 96366 THER/PROPH/DIAG IV INF ADDON: CPT

## 2023-10-01 PROCEDURE — 87086 URINE CULTURE/COLONY COUNT: CPT

## 2023-10-01 PROCEDURE — 2580000003 HC RX 258: Performed by: STUDENT IN AN ORGANIZED HEALTH CARE EDUCATION/TRAINING PROGRAM

## 2023-10-01 PROCEDURE — 96365 THER/PROPH/DIAG IV INF INIT: CPT

## 2023-10-01 PROCEDURE — C9113 INJ PANTOPRAZOLE SODIUM, VIA: HCPCS | Performed by: STUDENT IN AN ORGANIZED HEALTH CARE EDUCATION/TRAINING PROGRAM

## 2023-10-01 PROCEDURE — 83605 ASSAY OF LACTIC ACID: CPT

## 2023-10-01 PROCEDURE — 86901 BLOOD TYPING SEROLOGIC RH(D): CPT

## 2023-10-01 PROCEDURE — 93005 ELECTROCARDIOGRAM TRACING: CPT | Performed by: STUDENT IN AN ORGANIZED HEALTH CARE EDUCATION/TRAINING PROGRAM

## 2023-10-01 PROCEDURE — 85730 THROMBOPLASTIN TIME PARTIAL: CPT

## 2023-10-01 PROCEDURE — 87040 BLOOD CULTURE FOR BACTERIA: CPT

## 2023-10-01 PROCEDURE — 96361 HYDRATE IV INFUSION ADD-ON: CPT

## 2023-10-01 PROCEDURE — 86850 RBC ANTIBODY SCREEN: CPT

## 2023-10-01 PROCEDURE — 86900 BLOOD TYPING SEROLOGIC ABO: CPT

## 2023-10-01 PROCEDURE — 36415 COLL VENOUS BLD VENIPUNCTURE: CPT

## 2023-10-01 PROCEDURE — 99285 EMERGENCY DEPT VISIT HI MDM: CPT

## 2023-10-01 PROCEDURE — 71250 CT THORAX DX C-: CPT

## 2023-10-01 PROCEDURE — 85610 PROTHROMBIN TIME: CPT

## 2023-10-01 PROCEDURE — 80053 COMPREHEN METABOLIC PANEL: CPT

## 2023-10-01 PROCEDURE — 96375 TX/PRO/DX INJ NEW DRUG ADDON: CPT

## 2023-10-01 RX ORDER — CHLORPROMAZINE HYDROCHLORIDE 25 MG/ML
25 INJECTION INTRAMUSCULAR ONCE
Status: DISCONTINUED | OUTPATIENT
Start: 2023-10-01 | End: 2023-10-01

## 2023-10-01 RX ORDER — HYDRALAZINE HYDROCHLORIDE 25 MG/1
25 TABLET, FILM COATED ORAL 3 TIMES DAILY
COMMUNITY

## 2023-10-01 RX ORDER — METOCLOPRAMIDE HYDROCHLORIDE 5 MG/ML
10 INJECTION INTRAMUSCULAR; INTRAVENOUS ONCE
Status: COMPLETED | OUTPATIENT
Start: 2023-10-01 | End: 2023-10-01

## 2023-10-01 RX ORDER — LIDOCAINE AND PRILOCAINE 25; 25 MG/G; MG/G
CREAM TOPICAL PRN
COMMUNITY

## 2023-10-01 RX ORDER — SEVELAMER CARBONATE 800 MG/1
1 TABLET, FILM COATED ORAL
COMMUNITY

## 2023-10-01 RX ORDER — OLANZAPINE 5 MG/1
5 TABLET ORAL NIGHTLY
COMMUNITY

## 2023-10-01 RX ORDER — HYDROCODONE BITARTRATE AND ACETAMINOPHEN 5; 325 MG/1; MG/1
1 TABLET ORAL EVERY 6 HOURS PRN
COMMUNITY

## 2023-10-01 RX ORDER — DIPHENHYDRAMINE HYDROCHLORIDE 50 MG/ML
25 INJECTION INTRAMUSCULAR; INTRAVENOUS ONCE
Status: COMPLETED | OUTPATIENT
Start: 2023-10-01 | End: 2023-10-01

## 2023-10-01 RX ORDER — ACETAMINOPHEN 500 MG
1000 TABLET ORAL ONCE
Status: DISCONTINUED | OUTPATIENT
Start: 2023-10-01 | End: 2023-10-01 | Stop reason: HOSPADM

## 2023-10-01 RX ORDER — LANOLIN ALCOHOL/MO/W.PET/CERES
6 CREAM (GRAM) TOPICAL NIGHTLY PRN
COMMUNITY

## 2023-10-01 RX ORDER — FERROUS SULFATE 325(65) MG
325 TABLET ORAL
COMMUNITY

## 2023-10-01 RX ORDER — ISOSORBIDE MONONITRATE 30 MG/1
30 TABLET, EXTENDED RELEASE ORAL DAILY
COMMUNITY

## 2023-10-01 RX ORDER — ONDANSETRON 2 MG/ML
4 INJECTION INTRAMUSCULAR; INTRAVENOUS ONCE
Status: COMPLETED | OUTPATIENT
Start: 2023-10-01 | End: 2023-10-01

## 2023-10-01 RX ORDER — 0.9 % SODIUM CHLORIDE 0.9 %
1000 INTRAVENOUS SOLUTION INTRAVENOUS ONCE
Status: COMPLETED | OUTPATIENT
Start: 2023-10-01 | End: 2023-10-01

## 2023-10-01 RX ORDER — INSULIN GLARGINE 100 [IU]/ML
8 INJECTION, SOLUTION SUBCUTANEOUS NIGHTLY
COMMUNITY

## 2023-10-01 RX ORDER — ATORVASTATIN CALCIUM 40 MG/1
40 TABLET, FILM COATED ORAL DAILY
COMMUNITY

## 2023-10-01 RX ORDER — CHLORPROMAZINE HYDROCHLORIDE 25 MG/1
25 TABLET, FILM COATED ORAL 3 TIMES DAILY
COMMUNITY

## 2023-10-01 RX ADMIN — PANTOPRAZOLE SODIUM 80 MG: 40 INJECTION, POWDER, FOR SOLUTION INTRAVENOUS at 00:56

## 2023-10-01 RX ADMIN — SODIUM CHLORIDE 1000 ML: 9 INJECTION, SOLUTION INTRAVENOUS at 00:55

## 2023-10-01 RX ADMIN — DIPHENHYDRAMINE HYDROCHLORIDE 25 MG: 50 INJECTION INTRAMUSCULAR; INTRAVENOUS at 03:49

## 2023-10-01 RX ADMIN — METOCLOPRAMIDE 10 MG: 5 INJECTION, SOLUTION INTRAMUSCULAR; INTRAVENOUS at 03:49

## 2023-10-01 RX ADMIN — ONDANSETRON 4 MG: 2 INJECTION INTRAMUSCULAR; INTRAVENOUS at 00:55

## 2023-10-01 RX ADMIN — PANTOPRAZOLE SODIUM 8 MG/HR: 40 INJECTION, POWDER, FOR SOLUTION INTRAVENOUS at 00:59

## 2023-10-01 ASSESSMENT — ENCOUNTER SYMPTOMS
SHORTNESS OF BREATH: 0
BACK PAIN: 0
SORE THROAT: 0
COUGH: 0
DIARRHEA: 0
VOMITING: 1
FACIAL SWELLING: 0
ABDOMINAL PAIN: 1
BLOOD IN STOOL: 0
NAUSEA: 1
WHEEZING: 0

## 2023-10-01 ASSESSMENT — PAIN DESCRIPTION - LOCATION: LOCATION: ABDOMEN

## 2023-10-01 ASSESSMENT — PAIN - FUNCTIONAL ASSESSMENT: PAIN_FUNCTIONAL_ASSESSMENT: 0-10

## 2023-10-01 ASSESSMENT — PAIN SCALES - GENERAL: PAINLEVEL_OUTOF10: 9

## 2023-10-03 LAB
EKG ATRIAL RATE: 119 BPM
EKG P AXIS: 35 DEGREES
EKG P-R INTERVAL: 154 MS
EKG Q-T INTERVAL: 342 MS
EKG QRS DURATION: 128 MS
EKG QTC CALCULATION (BAZETT): 481 MS
EKG R AXIS: 51 DEGREES
EKG T AXIS: 134 DEGREES
EKG VENTRICULAR RATE: 119 BPM

## 2023-10-06 LAB
MICROORGANISM SPEC CULT: NORMAL
MICROORGANISM SPEC CULT: NORMAL
SERVICE CMNT-IMP: NORMAL
SERVICE CMNT-IMP: NORMAL
SPECIMEN DESCRIPTION: NORMAL
SPECIMEN DESCRIPTION: NORMAL

## 2023-11-25 ENCOUNTER — HOSPITAL ENCOUNTER (INPATIENT)
Age: 48
LOS: 5 days | Discharge: ANOTHER HOSPITAL | End: 2023-12-01
Attending: EMERGENCY MEDICINE | Admitting: INTERNAL MEDICINE
Payer: MEDICARE

## 2023-11-25 DIAGNOSIS — I21.4 NSTEMI (NON-ST ELEVATED MYOCARDIAL INFARCTION) (HCC): Primary | ICD-10-CM

## 2023-11-25 DIAGNOSIS — N18.6 ESRD ON DIALYSIS (HCC): ICD-10-CM

## 2023-11-25 DIAGNOSIS — Z99.2 ESRD ON DIALYSIS (HCC): ICD-10-CM

## 2023-11-25 PROCEDURE — 96375 TX/PRO/DX INJ NEW DRUG ADDON: CPT

## 2023-11-25 PROCEDURE — 99285 EMERGENCY DEPT VISIT HI MDM: CPT

## 2023-11-25 PROCEDURE — 96374 THER/PROPH/DIAG INJ IV PUSH: CPT

## 2023-11-25 ASSESSMENT — LIFESTYLE VARIABLES
HOW OFTEN DO YOU HAVE A DRINK CONTAINING ALCOHOL: NEVER
HOW MANY STANDARD DRINKS CONTAINING ALCOHOL DO YOU HAVE ON A TYPICAL DAY: PATIENT DOES NOT DRINK

## 2023-11-26 ENCOUNTER — APPOINTMENT (OUTPATIENT)
Dept: GENERAL RADIOLOGY | Age: 48
End: 2023-11-26
Payer: MEDICARE

## 2023-11-26 PROBLEM — I21.4 NSTEMI (NON-ST ELEVATED MYOCARDIAL INFARCTION) (HCC): Status: ACTIVE | Noted: 2023-11-26

## 2023-11-26 PROBLEM — Z99.2 ESRD ON DIALYSIS (HCC): Status: ACTIVE | Noted: 2023-11-26

## 2023-11-26 PROBLEM — N18.6 ESRD ON DIALYSIS (HCC): Status: ACTIVE | Noted: 2023-11-26

## 2023-11-26 LAB
ALBUMIN SERPL-MCNC: 3.5 G/DL (ref 3.5–5.2)
ALP SERPL-CCNC: 345 U/L (ref 40–129)
ALT SERPL-CCNC: 6 U/L (ref 5–41)
ANION GAP SERPL CALCULATED.3IONS-SCNC: 16 MMOL/L (ref 9–17)
ANTI-XA UNFRAC HEPARIN: >1.1 IU/L (ref 0.3–0.7)
AST SERPL-CCNC: 51 U/L
BASOPHILS # BLD: 0.1 K/UL (ref 0–0.2)
BASOPHILS NFR BLD: 2 % (ref 0–2)
BILIRUB SERPL-MCNC: 0.3 MG/DL (ref 0.3–1.2)
BUN SERPL-MCNC: 42 MG/DL (ref 6–20)
CALCIUM SERPL-MCNC: 10.8 MG/DL (ref 8.6–10.4)
CHLORIDE SERPL-SCNC: 95 MMOL/L (ref 98–107)
CO2 SERPL-SCNC: 21 MMOL/L (ref 20–31)
CREAT SERPL-MCNC: 5.6 MG/DL (ref 0.7–1.2)
EOSINOPHIL # BLD: 0.1 K/UL (ref 0–0.4)
EOSINOPHILS RELATIVE PERCENT: 1 % (ref 0–4)
ERYTHROCYTE [DISTWIDTH] IN BLOOD BY AUTOMATED COUNT: 18.2 % (ref 11.5–14.9)
GFR SERPL CREATININE-BSD FRML MDRD: 12 ML/MIN/1.73M2
GLUCOSE BLD-MCNC: 121 MG/DL (ref 75–110)
GLUCOSE BLD-MCNC: 122 MG/DL (ref 75–110)
GLUCOSE BLD-MCNC: 129 MG/DL (ref 75–110)
GLUCOSE BLD-MCNC: 142 MG/DL (ref 75–110)
GLUCOSE BLD-MCNC: 172 MG/DL (ref 75–110)
GLUCOSE SERPL-MCNC: 157 MG/DL (ref 70–99)
HCT VFR BLD AUTO: 33.9 % (ref 41–53)
HGB BLD-MCNC: 10.9 G/DL (ref 13.5–17.5)
LIPASE SERPL-CCNC: 15 U/L (ref 13–60)
LYMPHOCYTES NFR BLD: 1.5 K/UL (ref 1–4.8)
LYMPHOCYTES RELATIVE PERCENT: 20 % (ref 24–44)
MAGNESIUM SERPL-MCNC: 2.5 MG/DL (ref 1.6–2.6)
MCH RBC QN AUTO: 32.7 PG (ref 26–34)
MCHC RBC AUTO-ENTMCNC: 32 G/DL (ref 31–37)
MCV RBC AUTO: 102 FL (ref 80–100)
MONOCYTES NFR BLD: 1 K/UL (ref 0.1–1.3)
MONOCYTES NFR BLD: 13 % (ref 1–7)
NEUTROPHILS NFR BLD: 64 % (ref 36–66)
NEUTS SEG NFR BLD: 4.7 K/UL (ref 1.3–9.1)
PLATELET # BLD AUTO: 295 K/UL (ref 150–450)
PMV BLD AUTO: 6.9 FL (ref 6–12)
POTASSIUM SERPL-SCNC: 5.4 MMOL/L (ref 3.7–5.3)
POTASSIUM SERPL-SCNC: 5.5 MMOL/L (ref 3.7–5.3)
PROT SERPL-MCNC: 7.3 G/DL (ref 6.4–8.3)
RBC # BLD AUTO: 3.33 M/UL (ref 4.5–5.9)
SODIUM SERPL-SCNC: 132 MMOL/L (ref 135–144)
TROPONIN I SERPL HS-MCNC: 1527 NG/L (ref 0–22)
TROPONIN I SERPL HS-MCNC: 1553 NG/L (ref 0–22)
TROPONIN I SERPL HS-MCNC: 1885 NG/L (ref 0–22)
WBC OTHER # BLD: 7.3 K/UL (ref 3.5–11)

## 2023-11-26 PROCEDURE — 85520 HEPARIN ASSAY: CPT

## 2023-11-26 PROCEDURE — 6370000000 HC RX 637 (ALT 250 FOR IP): Performed by: NURSE PRACTITIONER

## 2023-11-26 PROCEDURE — 36415 COLL VENOUS BLD VENIPUNCTURE: CPT

## 2023-11-26 PROCEDURE — 2580000003 HC RX 258: Performed by: NURSE PRACTITIONER

## 2023-11-26 PROCEDURE — 82947 ASSAY GLUCOSE BLOOD QUANT: CPT

## 2023-11-26 PROCEDURE — 6360000002 HC RX W HCPCS: Performed by: INTERNAL MEDICINE

## 2023-11-26 PROCEDURE — 93005 ELECTROCARDIOGRAM TRACING: CPT | Performed by: NURSE PRACTITIONER

## 2023-11-26 PROCEDURE — 80053 COMPREHEN METABOLIC PANEL: CPT

## 2023-11-26 PROCEDURE — 6370000000 HC RX 637 (ALT 250 FOR IP): Performed by: EMERGENCY MEDICINE

## 2023-11-26 PROCEDURE — 83735 ASSAY OF MAGNESIUM: CPT

## 2023-11-26 PROCEDURE — 6370000000 HC RX 637 (ALT 250 FOR IP): Performed by: INTERNAL MEDICINE

## 2023-11-26 PROCEDURE — 94761 N-INVAS EAR/PLS OXIMETRY MLT: CPT

## 2023-11-26 PROCEDURE — 6360000002 HC RX W HCPCS: Performed by: EMERGENCY MEDICINE

## 2023-11-26 PROCEDURE — 93005 ELECTROCARDIOGRAM TRACING: CPT | Performed by: EMERGENCY MEDICINE

## 2023-11-26 PROCEDURE — 99223 1ST HOSP IP/OBS HIGH 75: CPT | Performed by: INTERNAL MEDICINE

## 2023-11-26 PROCEDURE — P9047 ALBUMIN (HUMAN), 25%, 50ML: HCPCS | Performed by: INTERNAL MEDICINE

## 2023-11-26 PROCEDURE — 2060000000 HC ICU INTERMEDIATE R&B

## 2023-11-26 PROCEDURE — 71045 X-RAY EXAM CHEST 1 VIEW: CPT

## 2023-11-26 PROCEDURE — 84132 ASSAY OF SERUM POTASSIUM: CPT

## 2023-11-26 PROCEDURE — 85025 COMPLETE CBC W/AUTO DIFF WBC: CPT

## 2023-11-26 PROCEDURE — 90935 HEMODIALYSIS ONE EVALUATION: CPT

## 2023-11-26 PROCEDURE — 84484 ASSAY OF TROPONIN QUANT: CPT

## 2023-11-26 PROCEDURE — 83690 ASSAY OF LIPASE: CPT

## 2023-11-26 RX ORDER — LANOLIN ALCOHOL/MO/W.PET/CERES
6 CREAM (GRAM) TOPICAL NIGHTLY PRN
Status: DISCONTINUED | OUTPATIENT
Start: 2023-11-26 | End: 2023-12-01 | Stop reason: HOSPADM

## 2023-11-26 RX ORDER — HYDRALAZINE HYDROCHLORIDE 25 MG/1
25 TABLET, FILM COATED ORAL 3 TIMES DAILY
Status: DISCONTINUED | OUTPATIENT
Start: 2023-11-26 | End: 2023-12-01 | Stop reason: HOSPADM

## 2023-11-26 RX ORDER — ACETAMINOPHEN 325 MG/1
650 TABLET ORAL EVERY 6 HOURS PRN
Status: DISCONTINUED | OUTPATIENT
Start: 2023-11-26 | End: 2023-12-01 | Stop reason: HOSPADM

## 2023-11-26 RX ORDER — HEPARIN SODIUM 1000 [USP'U]/ML
4000 INJECTION, SOLUTION INTRAVENOUS; SUBCUTANEOUS PRN
Status: DISCONTINUED | OUTPATIENT
Start: 2023-11-26 | End: 2023-11-27

## 2023-11-26 RX ORDER — HEPARIN SODIUM 10000 [USP'U]/100ML
5-30 INJECTION, SOLUTION INTRAVENOUS CONTINUOUS
Status: DISCONTINUED | OUTPATIENT
Start: 2023-11-26 | End: 2023-11-27

## 2023-11-26 RX ORDER — PANTOPRAZOLE SODIUM 40 MG/1
40 TABLET, DELAYED RELEASE ORAL
Status: DISCONTINUED | OUTPATIENT
Start: 2023-11-26 | End: 2023-12-01 | Stop reason: HOSPADM

## 2023-11-26 RX ORDER — NITROGLYCERIN 0.4 MG/1
0.4 TABLET SUBLINGUAL EVERY 5 MIN PRN
Status: DISCONTINUED | OUTPATIENT
Start: 2023-11-26 | End: 2023-12-01 | Stop reason: HOSPADM

## 2023-11-26 RX ORDER — ASPIRIN 81 MG/1
324 TABLET, CHEWABLE ORAL ONCE
Status: COMPLETED | OUTPATIENT
Start: 2023-11-26 | End: 2023-11-26

## 2023-11-26 RX ORDER — ONDANSETRON 4 MG/1
4 TABLET, ORALLY DISINTEGRATING ORAL EVERY 8 HOURS PRN
Status: DISCONTINUED | OUTPATIENT
Start: 2023-11-26 | End: 2023-11-26

## 2023-11-26 RX ORDER — SODIUM CHLORIDE 0.9 % (FLUSH) 0.9 %
5-40 SYRINGE (ML) INJECTION EVERY 12 HOURS SCHEDULED
Status: DISCONTINUED | OUTPATIENT
Start: 2023-11-26 | End: 2023-12-01 | Stop reason: HOSPADM

## 2023-11-26 RX ORDER — SODIUM CHLORIDE 9 MG/ML
INJECTION, SOLUTION INTRAVENOUS PRN
Status: DISCONTINUED | OUTPATIENT
Start: 2023-11-26 | End: 2023-12-01 | Stop reason: HOSPADM

## 2023-11-26 RX ORDER — ENOXAPARIN SODIUM 100 MG/ML
40 INJECTION SUBCUTANEOUS DAILY
Status: DISCONTINUED | OUTPATIENT
Start: 2023-11-26 | End: 2023-11-26 | Stop reason: SDUPTHER

## 2023-11-26 RX ORDER — VITAMIN C
1 TAB ORAL DAILY
Status: DISCONTINUED | OUTPATIENT
Start: 2023-11-26 | End: 2023-12-01 | Stop reason: HOSPADM

## 2023-11-26 RX ORDER — HEPARIN SODIUM 1000 [USP'U]/ML
4000 INJECTION, SOLUTION INTRAVENOUS; SUBCUTANEOUS ONCE
Status: DISCONTINUED | OUTPATIENT
Start: 2023-11-26 | End: 2023-11-26

## 2023-11-26 RX ORDER — HEPARIN SODIUM 1000 [USP'U]/ML
2000 INJECTION, SOLUTION INTRAVENOUS; SUBCUTANEOUS PRN
Status: DISCONTINUED | OUTPATIENT
Start: 2023-11-26 | End: 2023-11-26

## 2023-11-26 RX ORDER — VITAMIN B COMPLEX
2000 TABLET ORAL DAILY
Status: DISCONTINUED | OUTPATIENT
Start: 2023-11-26 | End: 2023-12-01 | Stop reason: HOSPADM

## 2023-11-26 RX ORDER — KETOROLAC TROMETHAMINE 30 MG/ML
30 INJECTION, SOLUTION INTRAMUSCULAR; INTRAVENOUS ONCE
Status: DISCONTINUED | OUTPATIENT
Start: 2023-11-26 | End: 2023-11-26

## 2023-11-26 RX ORDER — MORPHINE SULFATE 2 MG/ML
2 INJECTION, SOLUTION INTRAMUSCULAR; INTRAVENOUS ONCE
Status: COMPLETED | OUTPATIENT
Start: 2023-11-26 | End: 2023-11-26

## 2023-11-26 RX ORDER — SEVELAMER CARBONATE 800 MG/1
800 TABLET, FILM COATED ORAL
Status: DISCONTINUED | OUTPATIENT
Start: 2023-11-26 | End: 2023-12-01 | Stop reason: HOSPADM

## 2023-11-26 RX ORDER — ASPIRIN 81 MG/1
81 TABLET, CHEWABLE ORAL DAILY
Status: DISCONTINUED | OUTPATIENT
Start: 2023-11-27 | End: 2023-12-01 | Stop reason: HOSPADM

## 2023-11-26 RX ORDER — ISOSORBIDE MONONITRATE 30 MG/1
30 TABLET, EXTENDED RELEASE ORAL DAILY
Status: DISCONTINUED | OUTPATIENT
Start: 2023-11-26 | End: 2023-12-01 | Stop reason: HOSPADM

## 2023-11-26 RX ORDER — ATORVASTATIN CALCIUM 80 MG/1
80 TABLET, FILM COATED ORAL NIGHTLY
Status: DISCONTINUED | OUTPATIENT
Start: 2023-11-26 | End: 2023-11-26 | Stop reason: SDUPTHER

## 2023-11-26 RX ORDER — HEPARIN SODIUM 1000 [USP'U]/ML
2000 INJECTION, SOLUTION INTRAVENOUS; SUBCUTANEOUS PRN
Status: DISCONTINUED | OUTPATIENT
Start: 2023-11-26 | End: 2023-11-27

## 2023-11-26 RX ORDER — INSULIN GLARGINE 100 [IU]/ML
8 INJECTION, SOLUTION SUBCUTANEOUS NIGHTLY
Status: DISCONTINUED | OUTPATIENT
Start: 2023-11-26 | End: 2023-12-01 | Stop reason: HOSPADM

## 2023-11-26 RX ORDER — HEPARIN SODIUM 1000 [USP'U]/ML
4000 INJECTION, SOLUTION INTRAVENOUS; SUBCUTANEOUS ONCE
Status: COMPLETED | OUTPATIENT
Start: 2023-11-26 | End: 2023-11-26

## 2023-11-26 RX ORDER — ACETAMINOPHEN 650 MG/1
650 SUPPOSITORY RECTAL EVERY 6 HOURS PRN
Status: DISCONTINUED | OUTPATIENT
Start: 2023-11-26 | End: 2023-12-01 | Stop reason: HOSPADM

## 2023-11-26 RX ORDER — HEPARIN SODIUM 1000 [USP'U]/ML
4000 INJECTION, SOLUTION INTRAVENOUS; SUBCUTANEOUS PRN
Status: DISCONTINUED | OUTPATIENT
Start: 2023-11-26 | End: 2023-11-26

## 2023-11-26 RX ORDER — AMLODIPINE BESYLATE 5 MG/1
5 TABLET ORAL DAILY
Status: DISCONTINUED | OUTPATIENT
Start: 2023-11-26 | End: 2023-12-01 | Stop reason: HOSPADM

## 2023-11-26 RX ORDER — OLANZAPINE 10 MG/1
5 TABLET ORAL NIGHTLY
Status: DISCONTINUED | OUTPATIENT
Start: 2023-11-26 | End: 2023-12-01 | Stop reason: HOSPADM

## 2023-11-26 RX ORDER — MIDODRINE HYDROCHLORIDE 10 MG/1
10 TABLET ORAL PRN
Status: DISCONTINUED | OUTPATIENT
Start: 2023-11-26 | End: 2023-12-01 | Stop reason: HOSPADM

## 2023-11-26 RX ORDER — ATORVASTATIN CALCIUM 40 MG/1
40 TABLET, FILM COATED ORAL DAILY
Status: DISCONTINUED | OUTPATIENT
Start: 2023-11-26 | End: 2023-12-01 | Stop reason: HOSPADM

## 2023-11-26 RX ORDER — LANTHANUM CARBONATE 500 MG/1
1000 TABLET, CHEWABLE ORAL
Status: DISCONTINUED | OUTPATIENT
Start: 2023-11-26 | End: 2023-12-01 | Stop reason: HOSPADM

## 2023-11-26 RX ORDER — ONDANSETRON 2 MG/ML
4 INJECTION INTRAMUSCULAR; INTRAVENOUS EVERY 6 HOURS PRN
Status: DISCONTINUED | OUTPATIENT
Start: 2023-11-26 | End: 2023-11-26

## 2023-11-26 RX ORDER — CHLORPROMAZINE HYDROCHLORIDE 25 MG/1
25 TABLET, FILM COATED ORAL 3 TIMES DAILY
Status: DISCONTINUED | OUTPATIENT
Start: 2023-11-26 | End: 2023-12-01 | Stop reason: HOSPADM

## 2023-11-26 RX ORDER — HYDROCODONE BITARTRATE AND ACETAMINOPHEN 5; 325 MG/1; MG/1
1 TABLET ORAL EVERY 6 HOURS PRN
Status: DISCONTINUED | OUTPATIENT
Start: 2023-11-26 | End: 2023-12-01 | Stop reason: HOSPADM

## 2023-11-26 RX ORDER — HEPARIN SODIUM 10000 [USP'U]/100ML
5-30 INJECTION, SOLUTION INTRAVENOUS CONTINUOUS
Status: DISCONTINUED | OUTPATIENT
Start: 2023-11-26 | End: 2023-11-26

## 2023-11-26 RX ORDER — SODIUM CHLORIDE 0.9 % (FLUSH) 0.9 %
10 SYRINGE (ML) INJECTION PRN
Status: DISCONTINUED | OUTPATIENT
Start: 2023-11-26 | End: 2023-12-01 | Stop reason: HOSPADM

## 2023-11-26 RX ORDER — FERROUS SULFATE 325(65) MG
325 TABLET ORAL
Status: DISCONTINUED | OUTPATIENT
Start: 2023-11-26 | End: 2023-12-01 | Stop reason: HOSPADM

## 2023-11-26 RX ORDER — FUROSEMIDE 10 MG/ML
40 INJECTION INTRAMUSCULAR; INTRAVENOUS DAILY
Status: DISCONTINUED | OUTPATIENT
Start: 2023-11-26 | End: 2023-12-01 | Stop reason: HOSPADM

## 2023-11-26 RX ORDER — FUROSEMIDE 10 MG/ML
40 INJECTION INTRAMUSCULAR; INTRAVENOUS ONCE
Status: DISCONTINUED | OUTPATIENT
Start: 2023-11-26 | End: 2023-12-01 | Stop reason: HOSPADM

## 2023-11-26 RX ORDER — ALBUMIN (HUMAN) 12.5 G/50ML
25 SOLUTION INTRAVENOUS PRN
Status: DISCONTINUED | OUTPATIENT
Start: 2023-11-26 | End: 2023-12-01 | Stop reason: HOSPADM

## 2023-11-26 RX ADMIN — ASPIRIN 324 MG: 81 TABLET, CHEWABLE ORAL at 03:21

## 2023-11-26 RX ADMIN — SODIUM CHLORIDE, PRESERVATIVE FREE 10 ML: 5 INJECTION INTRAVENOUS at 21:01

## 2023-11-26 RX ADMIN — HYDROCODONE BITARTRATE AND ACETAMINOPHEN 1 TABLET: 5; 325 TABLET ORAL at 23:26

## 2023-11-26 RX ADMIN — FERROUS SULFATE TAB 325 MG (65 MG ELEMENTAL FE) 325 MG: 325 (65 FE) TAB at 08:28

## 2023-11-26 RX ADMIN — SODIUM ZIRCONIUM CYCLOSILICATE 10 G: 5 POWDER, FOR SUSPENSION ORAL at 03:43

## 2023-11-26 RX ADMIN — INSULIN GLARGINE 8 UNITS: 100 INJECTION, SOLUTION SUBCUTANEOUS at 21:08

## 2023-11-26 RX ADMIN — HEPARIN SODIUM 12 UNITS/KG/HR: 10000 INJECTION, SOLUTION INTRAVENOUS at 02:24

## 2023-11-26 RX ADMIN — ALBUMIN (HUMAN) 25 G: 0.25 INJECTION, SOLUTION INTRAVENOUS at 19:23

## 2023-11-26 RX ADMIN — ALBUMIN (HUMAN) 25 G: 0.25 INJECTION, SOLUTION INTRAVENOUS at 17:53

## 2023-11-26 RX ADMIN — METOPROLOL TARTRATE 25 MG: 25 TABLET, FILM COATED ORAL at 08:26

## 2023-11-26 RX ADMIN — Medication 2000 UNITS: at 08:28

## 2023-11-26 RX ADMIN — OLANZAPINE 5 MG: 10 TABLET, FILM COATED ORAL at 20:59

## 2023-11-26 RX ADMIN — Medication 6 MG: at 23:30

## 2023-11-26 RX ADMIN — AMLODIPINE BESYLATE 5 MG: 5 TABLET ORAL at 08:26

## 2023-11-26 RX ADMIN — CHLORPROMAZINE HYDROCHLORIDE 25 MG: 25 TABLET, FILM COATED ORAL at 21:02

## 2023-11-26 RX ADMIN — MORPHINE SULFATE 2 MG: 2 INJECTION, SOLUTION INTRAMUSCULAR; INTRAVENOUS at 01:26

## 2023-11-26 RX ADMIN — CHLORPROMAZINE HYDROCHLORIDE 25 MG: 25 TABLET, FILM COATED ORAL at 10:34

## 2023-11-26 RX ADMIN — PANTOPRAZOLE SODIUM 40 MG: 40 TABLET, DELAYED RELEASE ORAL at 08:26

## 2023-11-26 RX ADMIN — MIDODRINE HYDROCHLORIDE 10 MG: 10 TABLET ORAL at 19:04

## 2023-11-26 RX ADMIN — MIDODRINE HYDROCHLORIDE 10 MG: 10 TABLET ORAL at 17:48

## 2023-11-26 RX ADMIN — SEVELAMER CARBONATE 800 MG: 800 TABLET, FILM COATED ORAL at 08:26

## 2023-11-26 RX ADMIN — HYDROCODONE BITARTRATE AND ACETAMINOPHEN 1 TABLET: 5; 325 TABLET ORAL at 15:28

## 2023-11-26 RX ADMIN — HEPARIN SODIUM 4000 UNITS: 1000 INJECTION INTRAVENOUS; SUBCUTANEOUS at 02:22

## 2023-11-26 RX ADMIN — FUROSEMIDE 40 MG: 10 INJECTION, SOLUTION INTRAMUSCULAR; INTRAVENOUS at 12:45

## 2023-11-26 RX ADMIN — HYDRALAZINE HYDROCHLORIDE 25 MG: 25 TABLET, FILM COATED ORAL at 08:26

## 2023-11-26 RX ADMIN — ATORVASTATIN CALCIUM 40 MG: 40 TABLET, FILM COATED ORAL at 08:28

## 2023-11-26 RX ADMIN — CHLORPROMAZINE HYDROCHLORIDE 25 MG: 25 TABLET, FILM COATED ORAL at 15:28

## 2023-11-26 RX ADMIN — ISOSORBIDE MONONITRATE 30 MG: 30 TABLET, EXTENDED RELEASE ORAL at 08:28

## 2023-11-26 ASSESSMENT — PAIN SCALES - GENERAL
PAINLEVEL_OUTOF10: 8
PAINLEVEL_OUTOF10: 7
PAINLEVEL_OUTOF10: 9

## 2023-11-26 ASSESSMENT — ENCOUNTER SYMPTOMS
BACK PAIN: 1
NAUSEA: 0
COUGH: 0
ABDOMINAL PAIN: 0
SHORTNESS OF BREATH: 1
VOMITING: 0

## 2023-11-26 ASSESSMENT — PAIN DESCRIPTION - ORIENTATION: ORIENTATION: LEFT

## 2023-11-26 ASSESSMENT — PAIN DESCRIPTION - LOCATION
LOCATION: BACK;FOOT
LOCATION: CHEST
LOCATION: BACK;FOOT

## 2023-11-26 ASSESSMENT — PAIN DESCRIPTION - DESCRIPTORS: DESCRIPTORS: ACHING;JABBING

## 2023-11-26 NOTE — H&P
[address]    HISTORY AND PHYSICAL EXAMINATION            Date:   11/26/2023  Patient name:  Bhaskar Mccord  Date of admission:  11/25/2023 11:43 PM  MRN:   [id number]  Account:  [de-identified]  YOB: 1975  PCP:    Tyler Leonardo MD  Room:   2101/2101-01  Code Status:    Full Code    Chief Complaint:     Chief Complaint   Patient presents with    Shortness of Breath    Chest Pain       History Obtained From:     patient, electronic medical record    History of Present Illness: The patient is a 46 y.o.   Non- / non  male who presents with Shortness of Breath and Chest Pain   and he is admitted to the hospital for the management of chest pain  Patient, has past medical history multiple medical problem which include diabetes, hypertension, coronary artery disease s/p CABG, ischemic cardiomyopathy, ESRD on hemodialysis  Patient had a stroke during CABG surgery  Patient has residual weakness on right side, has speech difficulties, he is a resident of nursing home, he was brought to emergency room with complaints of chest pain  Chest pain was intermittent nature mainly in the center of the chest, no radiation of chest pain  Going on for last 7 days  In emergency room, patient was found to have very highly elevated troponins, although he has chronically elevated troponin due to CKD  Of note he missed couple of sessions of dialysis also  Patient is hyperkalemic, nephrology following  Patient started on heparin drip, EKG done in emergency room was negative for ST-T changes  Today morning patient feels the chest pain is improving  Patient was in [facility] in October with upper GI bleed, he could not tell me whether he had scopes done  Hemoglobin is stable      Past Medical History:     Past Medical History:   Diagnosis Date    Abscess, gluteal, left     CVA (cerebral vascular accident) ([address])     Diabetes mellitus ([address])     Dr. Yoly Casillas

## 2023-11-26 NOTE — CONSULTS
[facility]   Consult Note         Today's Date: 11/26/2023  Patient Name: Rogerio Sommer  Date of admission: 11/25/2023 11:43 PM  Patient's age: 46 y.o., 1975  Admission Dx: NSTEMI (non-ST elevated myocardial infarction) ([address]) [I21.4]    Reason for Consult:  Cardiac evaluation    Requesting Physician: Tita Albrecht MD    REASON FOR CONSULT:  nstemi    History Obtained From:  Patient, chart, staff, records    HISTORY OF PRESENT ILLNESS:      The patient is a 46 y.o. male who is admitted to the hospital for elevated trops, chest pain, fatigue. Past Medical History:   has a past medical history of Abscess, gluteal, left, CVA (cerebral vascular accident) ([address]), Diabetes mellitus ([address]), End stage renal disease ([address]), All's gangrene of scrotum, GERD (gastroesophageal reflux disease), Hemodialysis patient ([address]), Hyperlipidemia, Hypertension, and Presence of permanent central venous catheter. Past Surgical History:   has a past surgical history that includes Cholecystectomy; Tunneled venous catheter placement; Scrotal surgery (Bilateral, 10/11/2019); Scrotal surgery (Bilateral, 10/15/2019); Testicle removal (Right, 10/18/2019); Scrotal surgery; Dialysis fistula creation (Left, 05/27/2020); other surgical history (Left, 11/12/2020); other surgical history (08/05/2020); other surgical history (07/08/2020); and Cardiac surgery. Home Medications:    Prior to Admission medications    Medication Sig Start Date End Date Taking?  Authorizing Provider   atorvastatin (LIPITOR) 40 MG tablet Take 1 tablet by mouth daily    Franca Rose MD   chlorproMAZINE (THORAZINE) 25 MG tablet Take 1 tablet by mouth 3 times daily    Franca Rose MD   vitamin D (CHOLECALCIFEROL) 25 MCG (1000 UT) TABS tablet Take 2 tablets by mouth daily    Franca Rose MD   ferrous sulfate (IRON 325) 325 (65 Fe) MG tablet Take 1 tablet by mouth daily (with breakfast)    Franca Rose MD

## 2023-11-26 NOTE — PROGRESS NOTES
Spoke with Dr. Leopold Plank regarding if patient would have a cath tomorrow. Doctor said patient ended up not being Stemi and to put the patient on NPO diet orders in case patient needs a cath. Doctor informed that he would let us know if patient needs a cath.

## 2023-11-26 NOTE — PROGRESS NOTES
Writer agrees with and present for charting, assessments, and med administration by student nurse Teresa Montez

## 2023-11-26 NOTE — PROGRESS NOTES
Dr Patton Repress notified of increased trop to [address]. Reviewed K 5.4 and that pt is having SOB this am and placed on NC 2L for comfort, o2 didn't not drop during SOB. New order for IV lasix 40 mg one time dose.

## 2023-11-26 NOTE — CARE COORDINATION
Case Management Assessment  Initial Evaluation    Date/Time of Evaluation: 11/26/2023 5:34 PM  Assessment Completed by: Jessika Forrester RN    If patient is discharged prior to next notation, then this note serves as note for discharge by case management. Patient Name: Christiano Chin                   YOB: 1975  Diagnosis: NSTEMI (non-ST elevated myocardial infarction) [facility]) [I21.4]                   Date / Time: 11/25/2023 11:43 PM    Patient Admission Status: Inpatient   Readmission Risk (Low < 19, Mod (19-27), High > 27): Readmission Risk Score: 20.2    Current PCP: Mariah Rooney MD  PCP verified by CM? Chart Reviewed: Yes      History Provided by: Patient, Medical Record, Spouse  Patient Orientation: Alert and Oriented    Patient Cognition: Alert    Hospitalization in the last 30 days (Readmission):  No    If yes, Readmission Assessment in CM Navigator will be completed. Advance Directives:      Code Status: Full Code   Patient's Primary Decision Maker is: Legal Next of Kin      Discharge Planning:    Patient lives with:  Other (Comment) (from [facility]) Type of Home: [address] [facility] [facility] (Admitted from [address] [place] [address])  [address]:    Patient Support Systems include: Spouse/Significant Other   Current Financial resources: [employer], Medicare, Other (Comment) [facility] at [employer] T-TH-S @ 730AM)  Current community resources: ECF/Home Care ([facility])  Current services prior to admission: [address] [place] [address],[address] [facility]            Current DME: [employer]ucen[employer]ati Burk, [employer]            Type of Home Care services:  None    ADLS  Prior functional level: Assistance with the following:, Bathing, Dressing, Toileting, Mobility  Current functional level:      PT AM-PAC:   /24  OT AM-PAC:   /24    Family can provide assistance at DC: No  Would you like Case Management to discuss the discharge plan with any other family members/significant others, and

## 2023-11-26 NOTE — ED TRIAGE NOTES
Mode of arrival (squad #, walk in, police, etc) : Squad         Chief complaint(s): Shortness of Breathing , chest pain     Arrival Note (brief scenario, treatment PTA, etc). : patient brought by squad from [email or web address]  with complains of  chest pain and shortness of  Breathing started this evening tonight  , Patient was recently discharge form [facility]  to [email or web address] , was treated for Gi Bleeding , Patient has extensive medical history including recent history of hematemesis in October 2023, ischemic stroke September 2023, CAD status post CABG in July 2023, ESRD on hemodialysis Tuesday Thursday Saturday, insulin-dependent type 2 diabetes, hypertension, peripheral arterial disease, heart failure reduced ejection fraction, internal jugular DVT, history of osteomyelitis. Patient  is alert and oriented but does no  remember  due to his  medical  condition . C= \"Have you ever felt that you should Cut down on your drinking? \"  Yes  A= \"Have people Annoyed you by criticizing your drinking? \"  No  G= \"Have you ever felt bad or Guilty about your drinking? \"  No  E= \"Have you ever had a drink as an Eye-opener first thing in the morning to steady your nerves or to help a hangover? \"  No      Deferred []      Reason for deferring: N/A    *If yes to two or more: probable alcohol abuse. *

## 2023-11-26 NOTE — ED PROVIDER NOTES
EMERGENCY DEPARTMENT ENCOUNTER    Pt Name: Jaleesa Lagunas  MRN: [id number]  [address] [facility] [place] 1975  Date of evaluation: 11/26/23  CHIEF COMPLAINT       Chief Complaint   Patient presents with    Shortness of Breath    Chest Pain     HISTORY OF PRESENT ILLNESS   78-year-old male with past medical history of CAD status post CABG in July 2023, ESRD on hemodialysis Tuesday Thursday Saturday, insulin-dependent diabetes mellitus, hypertension, PAD presenting with chief complaint of chest pain and shortness of breath. The symptoms started this evening. He is from [facility] and was brought here by EMS for further evaluation. Patient is a poor historian but is able to tell me that he has a midline chest pressure without radiation. He also admits to shortness of breath and having difficulty catching his breath. The history is provided by the patient and the EMS personnel. REVIEW OF SYSTEMS     Review of Systems   Constitutional:  Negative for chills and fever. HENT:  Negative for congestion. Eyes:  Negative for visual disturbance. Respiratory:  Positive for shortness of breath. Negative for cough. Cardiovascular:  Positive for chest pain. Gastrointestinal:  Negative for abdominal pain, nausea and vomiting. Genitourinary:  Negative for flank pain. Musculoskeletal:  Positive for back pain (chronic). Neurological:  Negative for dizziness, light-headedness and headaches. Psychiatric/Behavioral:  Negative for behavioral problems.       PASTMEDICAL HISTORY     Past Medical History:   Diagnosis Date    Abscess, gluteal, left     CVA (cerebral vascular accident) ([address])     Diabetes mellitus ([address])     Dr. Kimberly Yao    End stage renal disease [facility])     All's gangrene of scrotum     including penis    GERD (gastroesophageal reflux disease)     Hemodialysis patient [facility])     [facility], [address] [facility], [phone], T-TH-SAT     Hyperlipidemia     Hypertension     Dr. Kimberly Yao    Presence of permanent acetaminophen (TYLENOL) tablet 650 mg     acetaminophen (TYLENOL) suppository 650 mg    magnesium hydroxide (MILK OF MAGNESIA) 400 MG/5ML suspension 30 mL    metoprolol tartrate (LOPRESSOR) tablet 25 mg    DISCONTD: atorvastatin (LIPITOR) tablet 80 mg    nitroGLYCERIN (NITROSTAT) SL tablet 0.4 mg    DISCONTD: enoxaparin (LOVENOX) injection 40 mg     Order Specific Question:   Indication of Use     Answer:   Prophylaxis-DVT/PE    aspirin chewable tablet 81 mg    aspirin chewable tablet 324 mg    sodium zirconium cyclosilicate (LOKELMA) oral suspension 10 g     DISCHARGE PRESCRIPTIONS:  Current Discharge Medication List        PHYSICIAN CONSULTS ORDERED THIS ENCOUNTER:  IP CONSULT TO CARDIOLOGY  IP CONSULT TO INTERNAL MEDICINE  IP CONSULT TO NEPHROLOGY  IP CONSULT TO CARDIOLOGY  IP CONSULT TO SPIRITUAL SERVICES  FINAL IMPRESSION      1. NSTEMI (non-ST elevated myocardial infarction) ([address])    2. ESRD on dialysis [facility])          DISPOSITION/PLAN   DISPOSITION Admitted 11/26/2023 02:42:20 AM      OUTPATIENT FOLLOW UP THE PATIENT:  No follow-up provider specified.     DO José Luis Hahn, [address] [place] [address]  11/26/23 3831

## 2023-11-27 PROBLEM — I25.10 ATHEROSCLEROTIC HEART DISEASE: Status: ACTIVE | Noted: 2023-11-27

## 2023-11-27 PROBLEM — I25.10 CORONARY ARTERY DISEASE: Status: ACTIVE | Noted: 2023-11-27

## 2023-11-27 LAB
ANION GAP SERPL CALCULATED.3IONS-SCNC: 14 MMOL/L (ref 9–17)
ANTI-XA UNFRAC HEPARIN: >1.1 IU/L (ref 0.3–0.7)
BUN SERPL-MCNC: 17 MG/DL (ref 6–20)
CALCIUM SERPL-MCNC: 10.7 MG/DL (ref 8.6–10.4)
CHLORIDE SERPL-SCNC: 93 MMOL/L (ref 98–107)
CHOLEST SERPL-MCNC: 104 MG/DL
CHOLESTEROL/HDL RATIO: 1.5
CO2 SERPL-SCNC: 27 MMOL/L (ref 20–31)
CREAT SERPL-MCNC: 3 MG/DL (ref 0.7–1.2)
EKG ATRIAL RATE: 96 BPM
EKG P AXIS: 69 DEGREES
EKG P-R INTERVAL: 214 MS
EKG Q-T INTERVAL: 404 MS
EKG QRS DURATION: 152 MS
EKG QTC CALCULATION (BAZETT): 510 MS
EKG R AXIS: -78 DEGREES
EKG T AXIS: 107 DEGREES
EKG VENTRICULAR RATE: 96 BPM
ERYTHROCYTE [DISTWIDTH] IN BLOOD BY AUTOMATED COUNT: 18.8 % (ref 11.5–14.9)
FOLATE SERPL-MCNC: 6.2 NG/ML
GFR SERPL CREATININE-BSD FRML MDRD: 25 ML/MIN/1.73M2
GLUCOSE BLD-MCNC: 119 MG/DL (ref 75–110)
GLUCOSE BLD-MCNC: 90 MG/DL (ref 75–110)
GLUCOSE SERPL-MCNC: 91 MG/DL (ref 70–99)
HCT VFR BLD AUTO: 30.4 % (ref 41–53)
HDLC SERPL-MCNC: 70 MG/DL
HGB BLD-MCNC: 9.7 G/DL (ref 13.5–17.5)
LDLC SERPL CALC-MCNC: 21 MG/DL (ref 0–130)
MAGNESIUM SERPL-MCNC: 2.4 MG/DL (ref 1.6–2.6)
MCH RBC QN AUTO: 33 PG (ref 26–34)
MCHC RBC AUTO-ENTMCNC: 32.1 G/DL (ref 31–37)
MCV RBC AUTO: 102.8 FL (ref 80–100)
PARTIAL THROMBOPLASTIN TIME: 39.1 SEC (ref 24–36)
PARTIAL THROMBOPLASTIN TIME: 40.9 SEC (ref 24–36)
PARTIAL THROMBOPLASTIN TIME: 42.9 SEC (ref 24–36)
PLATELET # BLD AUTO: 225 K/UL (ref 150–450)
PMV BLD AUTO: 7.7 FL (ref 6–12)
POTASSIUM SERPL-SCNC: 3.9 MMOL/L (ref 3.7–5.3)
RBC # BLD AUTO: 2.95 M/UL (ref 4.5–5.9)
SODIUM SERPL-SCNC: 134 MMOL/L (ref 135–144)
TRIGL SERPL-MCNC: 67 MG/DL
VIT B12 SERPL-MCNC: 874 PG/ML (ref 232–1245)
WBC OTHER # BLD: 5.5 K/UL (ref 3.5–11)

## 2023-11-27 PROCEDURE — 82607 VITAMIN B-12: CPT

## 2023-11-27 PROCEDURE — 90935 HEMODIALYSIS ONE EVALUATION: CPT

## 2023-11-27 PROCEDURE — 82746 ASSAY OF FOLIC ACID SERUM: CPT

## 2023-11-27 PROCEDURE — 6370000000 HC RX 637 (ALT 250 FOR IP): Performed by: NURSE PRACTITIONER

## 2023-11-27 PROCEDURE — 36415 COLL VENOUS BLD VENIPUNCTURE: CPT

## 2023-11-27 PROCEDURE — 6370000000 HC RX 637 (ALT 250 FOR IP): Performed by: INTERNAL MEDICINE

## 2023-11-27 PROCEDURE — 6360000002 HC RX W HCPCS: Performed by: NURSE PRACTITIONER

## 2023-11-27 PROCEDURE — 82947 ASSAY GLUCOSE BLOOD QUANT: CPT

## 2023-11-27 PROCEDURE — 80061 LIPID PANEL: CPT

## 2023-11-27 PROCEDURE — 83735 ASSAY OF MAGNESIUM: CPT

## 2023-11-27 PROCEDURE — 2060000000 HC ICU INTERMEDIATE R&B

## 2023-11-27 PROCEDURE — 85730 THROMBOPLASTIN TIME PARTIAL: CPT

## 2023-11-27 PROCEDURE — 99233 SBSQ HOSP IP/OBS HIGH 50: CPT | Performed by: INTERNAL MEDICINE

## 2023-11-27 PROCEDURE — 80048 BASIC METABOLIC PNL TOTAL CA: CPT

## 2023-11-27 PROCEDURE — 85520 HEPARIN ASSAY: CPT

## 2023-11-27 PROCEDURE — 99223 1ST HOSP IP/OBS HIGH 75: CPT | Performed by: INTERNAL MEDICINE

## 2023-11-27 PROCEDURE — 85027 COMPLETE CBC AUTOMATED: CPT

## 2023-11-27 PROCEDURE — 93010 ELECTROCARDIOGRAM REPORT: CPT | Performed by: INTERNAL MEDICINE

## 2023-11-27 RX ORDER — DEXTROSE MONOHYDRATE 100 MG/ML
INJECTION, SOLUTION INTRAVENOUS CONTINUOUS PRN
Status: DISCONTINUED | OUTPATIENT
Start: 2023-11-27 | End: 2023-12-01 | Stop reason: HOSPADM

## 2023-11-27 RX ORDER — HEPARIN SODIUM 1000 [USP'U]/ML
2000 INJECTION, SOLUTION INTRAVENOUS; SUBCUTANEOUS PRN
Status: DISCONTINUED | OUTPATIENT
Start: 2023-11-27 | End: 2023-12-01 | Stop reason: HOSPADM

## 2023-11-27 RX ORDER — HEPARIN SODIUM 1000 [USP'U]/ML
4000 INJECTION, SOLUTION INTRAVENOUS; SUBCUTANEOUS PRN
Status: DISCONTINUED | OUTPATIENT
Start: 2023-11-27 | End: 2023-12-01 | Stop reason: HOSPADM

## 2023-11-27 RX ORDER — HEPARIN SODIUM 10000 [USP'U]/100ML
5-30 INJECTION, SOLUTION INTRAVENOUS CONTINUOUS
Status: DISCONTINUED | OUTPATIENT
Start: 2023-11-27 | End: 2023-12-01 | Stop reason: HOSPADM

## 2023-11-27 RX ADMIN — HEPARIN SODIUM 4000 UNITS: 1000 INJECTION INTRAVENOUS; SUBCUTANEOUS at 20:47

## 2023-11-27 RX ADMIN — SEVELAMER CARBONATE 800 MG: 800 TABLET, FILM COATED ORAL at 18:35

## 2023-11-27 RX ADMIN — HEPARIN SODIUM 4000 UNITS: 1000 INJECTION INTRAVENOUS; SUBCUTANEOUS at 13:44

## 2023-11-27 RX ADMIN — PANTOPRAZOLE SODIUM 40 MG: 40 TABLET, DELAYED RELEASE ORAL at 06:32

## 2023-11-27 RX ADMIN — ISOSORBIDE MONONITRATE 30 MG: 30 TABLET, EXTENDED RELEASE ORAL at 19:13

## 2023-11-27 RX ADMIN — HYDROCODONE BITARTRATE AND ACETAMINOPHEN 1 TABLET: 5; 325 TABLET ORAL at 18:35

## 2023-11-27 RX ADMIN — OLANZAPINE 5 MG: 10 TABLET, FILM COATED ORAL at 20:38

## 2023-11-27 RX ADMIN — METOPROLOL TARTRATE 25 MG: 25 TABLET, FILM COATED ORAL at 19:13

## 2023-11-27 RX ADMIN — ATORVASTATIN CALCIUM 40 MG: 40 TABLET, FILM COATED ORAL at 18:35

## 2023-11-27 RX ADMIN — HYDROCODONE BITARTRATE AND ACETAMINOPHEN 1 TABLET: 5; 325 TABLET ORAL at 10:55

## 2023-11-27 RX ADMIN — CHLORPROMAZINE HYDROCHLORIDE 25 MG: 25 TABLET, FILM COATED ORAL at 20:38

## 2023-11-27 RX ADMIN — Medication 6 MG: at 23:00

## 2023-11-27 RX ADMIN — MIDODRINE HYDROCHLORIDE 10 MG: 10 TABLET ORAL at 10:11

## 2023-11-27 RX ADMIN — INSULIN GLARGINE 8 UNITS: 100 INJECTION, SOLUTION SUBCUTANEOUS at 20:38

## 2023-11-27 ASSESSMENT — PAIN DESCRIPTION - LOCATION
LOCATION: BACK;FOOT
LOCATION: BACK;FOOT

## 2023-11-27 ASSESSMENT — PAIN SCALES - GENERAL
PAINLEVEL_OUTOF10: 9
PAINLEVEL_OUTOF10: 9

## 2023-11-27 NOTE — PROGRESS NOTES
[address] [facility]   OCCUPATIONAL THERAPY MISSED TREATMENT NOTE   INPATIENT   Date: 23  Patient Name: Lois Barkley       Room: 5865/0610-44  MRN: [id number]   Account #: [de-identified]    : 1975  (46 y.o.)  Gender: male         REASON FOR MISSED TREATMENT:  Patient unable to participate   -   Attempt at , RN Lina Davenport reports pt is going to dialysis. OT will re-attempt tomorrow 23.       Electronically signed by CHEMA Low on 23 at 2:44 PM EST

## 2023-11-27 NOTE — PROGRESS NOTES
NEPHROLOGY PROGRESS NOTE     Patient :  Hubbard Meigs; 46 y.o. MRN# [id number]  Location:  2101/2101-01  Attending:  Kg Fuentes MD  Admit Date:  11/25/2023   Hospital Day: 1    Reason for consultation: Management of hemodialysis dependent end-stage renal disease. Consulting physician: Steve Walker MD.    Chief Complaint:  SOB, Chest pain    Interval history: Patient was seen and examined today and he is nonverbal and does not appear to be in respiratory distress. History of Present Illness: This is a 46 y.o. male has past medical history multiple medical problem which include diabetes, hypertension, coronary artery disease s/p CABG, ischemic cardiomyopathy, ESRD on hemodialysis  Patient had a stroke during CABG surgery. Patient was recently admitted to [facility] where he was treated for GI bleeding. Patient last dialysis was on 11/23/23 Thursday at [facility], Patient was discharged to Nursing home. Patient was suppose to have dialysis at [facility] on Monday. Patient presented to the hospital with c/o chest pain sob, which started yesterday. Patient noted to be hypotensive.  K 5.4, cR 5.6  Patient received Lokelma.   Troponin's very high  1885    Current Medications:    heparin (porcine) injection 4,000 Units, PRN  heparin (porcine) injection 2,000 Units, PRN  heparin 25,000 units in dextrose 5% 250 mL (premix) infusion, Continuous  glucose chewable tablet 16 g, PRN  dextrose bolus 10% 125 mL, PRN   Or  dextrose bolus 10% 250 mL, PRN  glucagon injection 1 mg, PRN  dextrose 10 % infusion, Continuous PRN  amLODIPine (NORVASC) tablet 5 mg, Daily  vitamin B and C (TOTAL B-C) 1 tablet, Daily  pantoprazole (PROTONIX) tablet 40 mg, QAM AC  atorvastatin (LIPITOR) tablet 40 mg, Daily  chlorproMAZINE (THORAZINE) tablet 25 mg, TID  Vitamin D (CHOLECALCIFEROL) tablet 2,000 Units, Daily  ferrous sulfate (IRON 325) tablet 325 mg, Daily with breakfast  hydrALAZINE (APRESOLINE) tablet 25 mg, TID  isosorbide mononitrate distress. HEAD: normocephalic  EYES: EOMI. Not pale, anicteric   NOSE:  No nasal discharge. CARDIAC: Normal S1 and S2. No S3, S4 or murmurs. Rhythm is regular. LUNGS: diminished breath sounds. ABDOMEN: Generally soft and nontender. Normal bowel sounds. MUSKULOSKELETAL: Adequately aligned spine. No joint erythema or tenderness. EXTREMITIES: + edema  NEURO: right hemiparesis. Labs:   CBC:  Recent Labs     11/26/23 0050 11/27/23  0637   WBC 7.3 5.5   RBC 3.33* 2.95*   HGB 10.9* 9.7*   HCT 33.9* 30.4*   .0* 102.8*   MCH 32.7 33.0   MCHC 32.0 32.1   RDW 18.2* 18.8*    225   MPV 6.9 7.7        BMP:   Recent Labs     11/26/23 0050 11/26/23  0604   *  --    K 5.5* 5.4*   CL 95*  --    CO2 21  --    BUN 42*  --    CREATININE 5.6*  --    GLUCOSE 157*  --    CALCIUM 10.8*  --        Magnesium:   Recent Labs     11/26/23 0050 11/27/23  0637   MG 2.5 2.4       Albumin:   Recent Labs     11/26/23  0050   LABALBU 3.5       Recent Labs     11/26/23  0050   PROT 7.3      Assessment/plan:    1. End-stage renal disease - patient is currently receiving hemodialysis at [facility] using left forearm AV fistula. He is scheduled is Monday/Wednesday/Friday. We will schedule for acute hemodialysis today  Check BMP Monday/Wednesday/Friday. 2.  Hyperkalemia - we will dialyze against a low potassium bath. 3.  Hyponatremia - we will treat with fluid restriction 1500 mL/day. 4.  Microcytic anemia - hemoglobin today is 9.7 g/dL. Will check vitamin M27 and folic acid levels and continue TALIB per protocol. Prognosis is guarded.     Electronically signed by Erasmonataly España MD on 11/27/2023 at 3:28 PM

## 2023-11-27 NOTE — PROGRESS NOTES
Comprehensive Nutrition Assessment    Type and Reason for Visit:  Positive Nutrition Screen (wt loss)    Nutrition Recommendations/Plan:   Recommend Low K+ diet with 5 carbohydrate choices per tray when diet is initiated. Malnutrition Assessment:  Malnutrition Status: At risk for malnutrition (Comment) (11/27/23 1905)    Context:  Chronic Illness     Findings of the 6 clinical characteristics of malnutrition:  Energy Intake:  Mild decrease in energy intake (Comment)  Weight Loss:  Unable to assess     Body Fat Loss:  Unable to assess     Muscle Mass Loss:  Unable to assess    Fluid Accumulation:  Unable to assess     Strength:  Not Performed    Nutrition Assessment:    Pt was admitted due to MI. He is npo for possible cardiac procedure later today. Pt states he is very hungry. Unable to verify wt loss- no recent wts available. He states he can eat most anything despite lack of teeth. Nutrition Related Findings:    Labs: POC Glu 119, K 5.4, Meds: Renvela, Fosrenal, PMH: DM, CABG, ESRD/HD, CVA, recent GI bleed Wound Type: None       Current Nutrition Intake & Therapies:    Average Meal Intake: NPO     Diet NPO    Anthropometric Measures:  Height: 170.2 cm (5' 7\")  Ideal Body Weight (IBW): 148 lbs (67 kg)    Admission Body Weight: 69.4 kg (153 lb)  Current Body Weight: 69.4 kg (153 lb),   IBW. Weight Source: Bed Scale  Current BMI (kg/m2): 24  Usual Body Weight: 72.6 kg (160 lb)  % Weight Change (Calculated): -4.4                    BMI Categories: Normal Weight (BMI 18.5-24. 9)    Estimated Daily Nutrient Needs:  Energy Requirements Based On: Formula  Weight Used for Energy Requirements: Admission  Energy (kcal/day): 1.3g/kg= 2000 kcal  Weight Used for Protein Requirements: Admission  Protein (g/day): 2 g/kg= 140 g     Fluid (ml/day): 1500 or per MD    Nutrition Diagnosis:   Inadequate oral intake related to  (current medical condition) as evidenced by NPO or clear liquid status due to medical

## 2023-11-27 NOTE — PROGRESS NOTES
Spoke with Malinda GALVAN and will pause the Heparin for better bleeding control of fistula for one hour starting at [facility]. Per the NP the Heparin was restarted.

## 2023-11-27 NOTE — PROGRESS NOTES
Physical Therapy        Physical Therapy Cancel Note      DATE: 2023    NAME: Martha Hsu  MRN: [id number]   : 1975      Patient not seen this date for Physical Therapy due to:    Hemodialysis: Attempt at 56, RN Carl Solorzano reports pt is going to dialysis. PT will re-attempt tomorrow 23.       Electronically signed by Rosemary Abreu PT on 2023 at 3:02 PM

## 2023-11-27 NOTE — PROGRESS NOTES
11/22/2023, [facility]  Narrative      Left Ventricle: Systolic function is severely decreased with an   ejection fraction of 20-25%. Left Ventricle   Left ventricle is mildly dilated. There is moderate increased wall thickness/hypertrophy. Systolic function is severely decreased with an ejection fraction of 20-25%. See wall score diagram for wall motion abnormalities. Unable to assess diastolic function. Right Ventricle   Right ventricular size appears normal. Systolic function is mildly reduced. Left Atrium   The left atrial volume index is 37.1 mL/m2. Right Atrium   Right atrium is normal in size. Mitral Valve   The leaflets are moderately thickened. Tricuspid Valve   The leaflets are mildly thickened. Aortic Valve   The aortic valve is trileaflet. The leaflets are mildly thickened. Pulmonic Valve   The leaflets are mildly thickened. Pericardium   There is a trivial pericardial effusion.                Assessment / Acute Cardiac Problems:       Patient Active Problem List:     Abscess, gluteal, left     Acute on chronic kidney failure (HCC)     Diabetes type 2, uncontrolled     S/P arteriovenous (AV) fistula creation     S/P arteriovenous (AV) fistula repair     NSTEMI (non-ST elevated myocardial infarction) ([address])     ESRD on dialysis [facility])      Plan of Treatment:   CAD CABG patient is a regular patient of [facility]  Quite elevated tropes, possible type I  Talk in detail to the patient regarding heart cath patient told to talk with the wife  Talked on telephone with the wife she told that she he had a heart cath done in July and [facility]  Details are not known  I talked to her that we will find out the catheter report and pursue further to make sure that there is some lesions that can be angioplasty otherwise if there was extensive disease without any target then we will talk to the patient and wife and told that At this time we will not make any difference  At present patient is without

## 2023-11-27 NOTE — CARE COORDINATION
ONGOING DISCHARGE  NOTE:      Writer reviewed notes, Patient is agreeable to discharge to [facility] and the [address] [facility]   Phone [phone]  Fax [phone]       Patient does not need a pre cert for discharge. Patient is a Bed Hold. Patient is being admitted under his medicaid, pt will just need a level of care      Will continue to follow for additional discharge needs. If patient is discharged prior to next notation, then this note serves as note for discharge by case management.     Electronically signed by Jovita Tolliver RN on 11/27/2023 at 12:01 PM

## 2023-11-27 NOTE — PLAN OF CARE
Problem: Safety - Adult  Goal: Free from fall injury  11/26/2023 2235 by Amie Nieves RN  Outcome: Progressing     Problem: ABCDS Injury Assessment  Goal: Absence of physical injury  11/26/2023 2235 by Amie Nieves RN  Outcome: Progressing     Problem: Skin/Tissue Integrity  Goal: Absence of new skin breakdown  Description: 1. Monitor for areas of redness and/or skin breakdown  2. Assess vascular access sites hourly  3. Every 4-6 hours minimum:  Change oxygen saturation probe site  4. Every 4-6 hours:  If on nasal continuous positive airway pressure, respiratory therapy assess nares and determine need for appliance change or resting period.   11/26/2023 2235 by Amie Nieves RN  Outcome: Progressing     Problem: Chronic Conditions and Co-morbidities  Goal: Patient's chronic conditions and co-morbidity symptoms are monitored and maintained or improved  11/26/2023 2235 by Amie Nieves RN  Outcome: Progressing  Flowsheets (Taken 11/26/2023 2100)  Care Plan - Patient's Chronic Conditions and Co-Morbidity Symptoms are Monitored and Maintained or Improved:   Monitor and assess patient's chronic conditions and comorbid symptoms for stability, deterioration, or improvement   Collaborate with multidisciplinary team to address chronic and comorbid conditions and prevent exacerbation or deterioration

## 2023-11-27 NOTE — PLAN OF CARE
Problem: Discharge Planning  Goal: Discharge to home or other facility with appropriate resources  Outcome: Progressing    Problem: Safety - Adult  Goal: Free from fall injury  Outcome: Progressing  Flowsheets (Taken 11/27/2023 1527)  Free From Fall Injury:   Instruct family/caregiver on patient safety   Based on caregiver fall risk screen, instruct family/caregiver to ask for assistance with transferring infant if caregiver noted to have fall risk factors     Problem: ABCDS Injury Assessment  Goal: Absence of physical injury  Outcome: Progressing  Flowsheets (Taken 11/27/2023 1527)  Absence of Physical Injury: Implement safety measures based on patient assessment     Problem: Skin/Tissue Integrity  Goal: Absence of new skin breakdown  Description: 1. Monitor for areas of redness and/or skin breakdown  2. Assess vascular access sites hourly  3. Every 4-6 hours minimum:  Change oxygen saturation probe site  4. Every 4-6 hours:  If on nasal continuous positive airway pressure, respiratory therapy assess nares and determine need for appliance change or resting period.   Outcome: Progressing     Problem: Chronic Conditions and Co-morbidities  Goal: Patient's chronic conditions and co-morbidity symptoms are monitored and maintained or improved  Outcome: Progressing    Problem: Nutrition Deficit:  Goal: Optimize nutritional status  Outcome: Progressing  Flowsheets (Taken 11/27/2023 1527)  Nutrient intake appropriate for improving, restoring, or maintaining nutritional needs:   Monitor oral intake, labs, and treatment plans   Assess nutritional status and recommend course of action

## 2023-11-28 LAB
ALBUMIN SERPL-MCNC: 3.2 G/DL (ref 3.5–5.2)
ANION GAP SERPL CALCULATED.3IONS-SCNC: 13 MMOL/L (ref 9–17)
ANTI-XA UNFRAC HEPARIN: 0.7 IU/L (ref 0.3–0.7)
BUN SERPL-MCNC: 23 MG/DL (ref 6–20)
CALCIUM SERPL-MCNC: 10.6 MG/DL (ref 8.6–10.4)
CHLORIDE SERPL-SCNC: 95 MMOL/L (ref 98–107)
CO2 SERPL-SCNC: 26 MMOL/L (ref 20–31)
CREAT SERPL-MCNC: 3.9 MG/DL (ref 0.7–1.2)
EKG ATRIAL RATE: 102 BPM
EKG P AXIS: 50 DEGREES
EKG P-R INTERVAL: 192 MS
EKG Q-T INTERVAL: 390 MS
EKG QRS DURATION: 140 MS
EKG QTC CALCULATION (BAZETT): 510 MS
EKG R AXIS: -59 DEGREES
EKG T AXIS: 127 DEGREES
EKG VENTRICULAR RATE: 103 BPM
ERYTHROCYTE [DISTWIDTH] IN BLOOD BY AUTOMATED COUNT: 18.2 % (ref 11.5–14.9)
GFR SERPL CREATININE-BSD FRML MDRD: 18 ML/MIN/1.73M2
GLUCOSE BLD-MCNC: 104 MG/DL (ref 75–110)
GLUCOSE BLD-MCNC: 123 MG/DL (ref 75–110)
GLUCOSE BLD-MCNC: 143 MG/DL (ref 75–110)
GLUCOSE BLD-MCNC: 151 MG/DL (ref 75–110)
GLUCOSE SERPL-MCNC: 103 MG/DL (ref 70–99)
HCT VFR BLD AUTO: 30.5 % (ref 41–53)
HGB BLD-MCNC: 9.9 G/DL (ref 13.5–17.5)
MCH RBC QN AUTO: 33.3 PG (ref 26–34)
MCHC RBC AUTO-ENTMCNC: 32.4 G/DL (ref 31–37)
MCV RBC AUTO: 102.9 FL (ref 80–100)
PARTIAL THROMBOPLASTIN TIME: 40.9 SEC (ref 24–36)
PARTIAL THROMBOPLASTIN TIME: 53.8 SEC (ref 24–36)
PARTIAL THROMBOPLASTIN TIME: 54.1 SEC (ref 24–36)
PARTIAL THROMBOPLASTIN TIME: 63.2 SEC (ref 24–36)
PHOSPHATE SERPL-MCNC: 4.8 MG/DL (ref 2.5–4.5)
PLATELET # BLD AUTO: 230 K/UL (ref 150–450)
PMV BLD AUTO: 7.5 FL (ref 6–12)
POTASSIUM SERPL-SCNC: 4.2 MMOL/L (ref 3.7–5.3)
RBC # BLD AUTO: 2.97 M/UL (ref 4.5–5.9)
SODIUM SERPL-SCNC: 134 MMOL/L (ref 135–144)
WBC OTHER # BLD: 4.9 K/UL (ref 3.5–11)

## 2023-11-28 PROCEDURE — 97166 OT EVAL MOD COMPLEX 45 MIN: CPT

## 2023-11-28 PROCEDURE — 85520 HEPARIN ASSAY: CPT

## 2023-11-28 PROCEDURE — 97530 THERAPEUTIC ACTIVITIES: CPT

## 2023-11-28 PROCEDURE — 6370000000 HC RX 637 (ALT 250 FOR IP): Performed by: INTERNAL MEDICINE

## 2023-11-28 PROCEDURE — 6370000000 HC RX 637 (ALT 250 FOR IP): Performed by: NURSE PRACTITIONER

## 2023-11-28 PROCEDURE — 85730 THROMBOPLASTIN TIME PARTIAL: CPT

## 2023-11-28 PROCEDURE — 80069 RENAL FUNCTION PANEL: CPT

## 2023-11-28 PROCEDURE — 97162 PT EVAL MOD COMPLEX 30 MIN: CPT

## 2023-11-28 PROCEDURE — 93010 ELECTROCARDIOGRAM REPORT: CPT | Performed by: INTERNAL MEDICINE

## 2023-11-28 PROCEDURE — 2060000000 HC ICU INTERMEDIATE R&B

## 2023-11-28 PROCEDURE — 36415 COLL VENOUS BLD VENIPUNCTURE: CPT

## 2023-11-28 PROCEDURE — 99232 SBSQ HOSP IP/OBS MODERATE 35: CPT | Performed by: INTERNAL MEDICINE

## 2023-11-28 PROCEDURE — 2580000003 HC RX 258: Performed by: NURSE PRACTITIONER

## 2023-11-28 PROCEDURE — 99233 SBSQ HOSP IP/OBS HIGH 50: CPT | Performed by: NURSE PRACTITIONER

## 2023-11-28 PROCEDURE — 82947 ASSAY GLUCOSE BLOOD QUANT: CPT

## 2023-11-28 PROCEDURE — 6360000002 HC RX W HCPCS: Performed by: NURSE PRACTITIONER

## 2023-11-28 PROCEDURE — 6360000002 HC RX W HCPCS: Performed by: INTERNAL MEDICINE

## 2023-11-28 PROCEDURE — 85027 COMPLETE CBC AUTOMATED: CPT

## 2023-11-28 RX ADMIN — SEVELAMER CARBONATE 800 MG: 800 TABLET, FILM COATED ORAL at 09:27

## 2023-11-28 RX ADMIN — PANTOPRAZOLE SODIUM 40 MG: 40 TABLET, DELAYED RELEASE ORAL at 05:41

## 2023-11-28 RX ADMIN — FUROSEMIDE 40 MG: 10 INJECTION, SOLUTION INTRAMUSCULAR; INTRAVENOUS at 09:29

## 2023-11-28 RX ADMIN — INSULIN GLARGINE 8 UNITS: 100 INJECTION, SOLUTION SUBCUTANEOUS at 21:00

## 2023-11-28 RX ADMIN — METOPROLOL TARTRATE 25 MG: 25 TABLET, FILM COATED ORAL at 21:00

## 2023-11-28 RX ADMIN — CHLORPROMAZINE HYDROCHLORIDE 25 MG: 25 TABLET, FILM COATED ORAL at 09:30

## 2023-11-28 RX ADMIN — SEVELAMER CARBONATE 800 MG: 800 TABLET, FILM COATED ORAL at 12:00

## 2023-11-28 RX ADMIN — AMLODIPINE BESYLATE 5 MG: 5 TABLET ORAL at 09:28

## 2023-11-28 RX ADMIN — SODIUM CHLORIDE, PRESERVATIVE FREE 10 ML: 5 INJECTION INTRAVENOUS at 09:29

## 2023-11-28 RX ADMIN — HEPARIN SODIUM 2000 UNITS: 1000 INJECTION INTRAVENOUS; SUBCUTANEOUS at 02:36

## 2023-11-28 RX ADMIN — HEPARIN SODIUM 2000 UNITS: 1000 INJECTION INTRAVENOUS; SUBCUTANEOUS at 09:34

## 2023-11-28 RX ADMIN — HYDROCODONE BITARTRATE AND ACETAMINOPHEN 1 TABLET: 5; 325 TABLET ORAL at 17:35

## 2023-11-28 RX ADMIN — HYDROCODONE BITARTRATE AND ACETAMINOPHEN 1 TABLET: 5; 325 TABLET ORAL at 09:27

## 2023-11-28 RX ADMIN — ATORVASTATIN CALCIUM 40 MG: 40 TABLET, FILM COATED ORAL at 09:28

## 2023-11-28 RX ADMIN — OLANZAPINE 5 MG: 10 TABLET, FILM COATED ORAL at 21:00

## 2023-11-28 RX ADMIN — SEVELAMER CARBONATE 800 MG: 800 TABLET, FILM COATED ORAL at 17:29

## 2023-11-28 RX ADMIN — Medication 1 TABLET: at 09:30

## 2023-11-28 RX ADMIN — LANTHANUM CARBONATE 1000 MG: 500 TABLET, CHEWABLE ORAL at 17:30

## 2023-11-28 RX ADMIN — Medication 2000 UNITS: at 09:28

## 2023-11-28 RX ADMIN — LANTHANUM CARBONATE 1000 MG: 500 TABLET, CHEWABLE ORAL at 09:30

## 2023-11-28 RX ADMIN — MIDODRINE HYDROCHLORIDE 10 MG: 10 TABLET ORAL at 11:59

## 2023-11-28 RX ADMIN — HYDROCODONE BITARTRATE AND ACETAMINOPHEN 1 TABLET: 5; 325 TABLET ORAL at 22:12

## 2023-11-28 RX ADMIN — HYDRALAZINE HYDROCHLORIDE 25 MG: 25 TABLET, FILM COATED ORAL at 21:00

## 2023-11-28 RX ADMIN — CHLORPROMAZINE HYDROCHLORIDE 25 MG: 25 TABLET, FILM COATED ORAL at 17:30

## 2023-11-28 RX ADMIN — FERROUS SULFATE TAB 325 MG (65 MG ELEMENTAL FE) 325 MG: 325 (65 FE) TAB at 09:28

## 2023-11-28 RX ADMIN — Medication 6 MG: at 21:00

## 2023-11-28 RX ADMIN — ISOSORBIDE MONONITRATE 30 MG: 30 TABLET, EXTENDED RELEASE ORAL at 09:28

## 2023-11-28 RX ADMIN — ASPIRIN 81 MG: 81 TABLET, CHEWABLE ORAL at 09:28

## 2023-11-28 RX ADMIN — METOPROLOL TARTRATE 25 MG: 25 TABLET, FILM COATED ORAL at 09:28

## 2023-11-28 RX ADMIN — CHLORPROMAZINE HYDROCHLORIDE 25 MG: 25 TABLET, FILM COATED ORAL at 22:13

## 2023-11-28 ASSESSMENT — PAIN SCALES - GENERAL
PAINLEVEL_OUTOF10: 8
PAINLEVEL_OUTOF10: 9

## 2023-11-28 ASSESSMENT — PAIN DESCRIPTION - LOCATION: LOCATION: BACK;FOOT

## 2023-11-28 NOTE — PROGRESS NOTES
Physical Therapy  Facility/Department: [address] [place] [address]  Physical Therapy Initial Assessment    Name: Stephanie Donovan  : 1975  MRN: [id number]  Date of Service: 2023    Discharge Recommendations:  Patient would benefit from continued therapy after discharge, Therapy recommended at discharge   PT Equipment Recommendations  Equipment Needed: No      Patient Diagnosis(es): The primary encounter diagnosis was NSTEMI (non-ST elevated myocardial infarction) ([address]). A diagnosis of ESRD on dialysis [facility]) was also pertinent to this visit. Past Medical History:  has a past medical history of Abscess, gluteal, left, CVA (cerebral vascular accident) ([address]), Diabetes mellitus ([address]), End stage renal disease ([address]), All's gangrene of scrotum, GERD (gastroesophageal reflux disease), Hemodialysis patient ([address]), Hyperlipidemia, Hypertension, and Presence of permanent central venous catheter. Past Surgical History:  has a past surgical history that includes Cholecystectomy; Tunneled venous catheter placement; Scrotal surgery (Bilateral, 10/11/2019); Scrotal surgery (Bilateral, 10/15/2019); Testicle removal (Right, 10/18/2019); Scrotal surgery; Dialysis fistula creation (Left, 2020); other surgical history (Left, 2020); other surgical history (2020); other surgical history (2020); and Cardiac surgery. Assessment   Body Structures, Functions, Activity Limitations Requiring Skilled Therapeutic Intervention: Decreased functional mobility ; Decreased endurance;Decreased balance;Decreased strength  Assessment: Impaired mobility due to decreased tolerance to activity and increased pain  and safety issues of decreased balance and strength  Decision Making: Medium Complexity  History: CVA  Exam: decreased balance, strength, endurance, mobility; increased pain  Clinical Presentation: evolving  Requires PT Follow-Up: Yes  Activity Tolerance  Activity Tolerance: Patient tolerated evaluation without getting any therapy at [facility]  Vision/Hearing  Vision  Vision: Impaired  Vision Exceptions: Wears glasses for reading  Hearing  Hearing: Exceptions to [facility]  Hearing Exceptions: Hard of hearing/hearing concerns; No hearing aid (Pt reports L ear is plugged)            Objective   O2 Device: None (Room air)          PROM RLE (degrees)  RLE PROM: WFL  AROM LLE (degrees)  LLE AROM : WFL  LLE General AROM: tight HS  Strength RLE  Comment: grossly 1/5  Strength LLE  Comment: grossly 3-/3+/5           Bed mobility  Rolling to Left: Moderate assistance  Rolling to Right: Minimal assistance  Supine to Sit: Moderate assistance  Sit to Supine: 2 Person assistance; Moderate assistance;Minimal assistance (modA for LE and Mil for trunk)  Scooting:  Moderate assistance  Bed Mobility Comments: pt c/o increased LBP after sitting EOB for 3-4min           Balance  Sitting - Static: Fair;+ (SBA)  Sitting - Dynamic: Fair (CGA)  Comments: pt with L UE support on bed         AM-PAC Score  AM-PAC Inpatient Mobility Raw Score : 8 (11/28/23 1155)  AM-PAC Inpatient T-Scale Score : 28.52 (11/28/23 1155)  Mobility Inpatient CMS 0-100% Score: 86.62 (11/28/23 1155)  Mobility Inpatient CMS G-Code Modifier : CM (11/28/23 1155)               Goals  Short Term Goals  Time Frame for Short Term Goals: 2-3 days  Short Term Goal 1: bed mobility with modA of 1  Short Term Goal 2: pt able to tolerate sitting EOB x 5-10min for increased endurance  Short Term Goal 3: pivot transfers with min/modA of 2              Therapy Time   Individual Concurrent Group Co-treatment   Time In 1119         Time Out 1144         Minutes 25         Timed Code Treatment Minutes: [place], PT

## 2023-11-28 NOTE — PLAN OF CARE
Problem: Discharge Planning  Goal: Discharge to home or other facility with appropriate resources  Outcome: Progressing     Problem: Safety - Adult  Goal: Free from fall injury  11/28/2023 1753 by America Santiago RN  Outcome: Progressing     Problem: ABCDS Injury Assessment  Goal: Absence of physical injury  Outcome: Progressing     Problem: Skin/Tissue Integrity  Goal: Absence of new skin breakdown  Description: 1. Monitor for areas of redness and/or skin breakdown  2. Assess vascular access sites hourly  3. Every 4-6 hours minimum:  Change oxygen saturation probe site  4. Every 4-6 hours:  If on nasal continuous positive airway pressure, respiratory therapy assess nares and determine need for appliance change or resting period.   11/28/2023 1753 by America Santiago RN  Outcome: Progressing     Problem: Chronic Conditions and Co-morbidities  Goal: Patient's chronic conditions and co-morbidity symptoms are monitored and maintained or improved  Outcome: Progressing     Problem: Nutrition Deficit:  Goal: Optimize nutritional status  11/28/2023 1753 by America Santiago RN  Outcome: Progressing

## 2023-11-28 NOTE — PLAN OF CARE
Problem: Safety - Adult  Goal: Free from fall injury  11/28/2023 0402 by Rhiannon Coleman RN  Outcome: Progressing  Note: No falls noted this shift, bed in lowest position, call light within reach, side rails up. Problem: Skin/Tissue Integrity  Goal: Absence of new skin breakdown  Description: 1. Monitor for areas of redness and/or skin breakdown  2. Assess vascular access sites hourly  3. Every 4-6 hours minimum:  Change oxygen saturation probe site  4. Every 4-6 hours:  If on nasal continuous positive airway pressure, respiratory therapy assess nares and determine need for appliance change or resting period. 11/28/2023 0402 by Rhiannon Coleman RN  Outcome: Progressing  Note: Skin assessment as charted, assist patient with Q2 and PRN turning.

## 2023-11-28 NOTE — PROGRESS NOTES
Per day shift labs were supposed to be drawn two hours after dialysis, labs were drawn too soon. Orders placed for redraw.

## 2023-11-28 NOTE — PROGRESS NOTES
NEPHROLOGY PROGRESS NOTE     Patient :  Cuba Cornejo; 46 y.o. MRN# [id number]  Location:  2101/2101-01  Attending:  Gabrielle Polo MD  Admit Date:  11/25/2023   Hospital Day: 2    Reason for consultation: Management of hemodialysis dependent end-stage renal disease. Consulting physician: Luca Tarango MD.    Chief Complaint:  SOB, Chest pain    Interval history: Patient was seen and examined today NO NEW COMPLAINS, No chest pain  and does not appear to be in respiratory distress. On heparin ggt  Had dialysis yesterday    History of Present Illness: This is a 46 y.o. male has past medical history multiple medical problem which include diabetes, hypertension, coronary artery disease s/p CABG, ischemic cardiomyopathy, ESRD on hemodialysis  Patient had a stroke during CABG surgery. Patient was recently admitted to [facility] where he was treated for GI bleeding. Patient last dialysis was on 11/23/23 Thursday at [facility], Patient was discharged to Nursing home. Patient was suppose to have dialysis at [facility] on Monday. Patient presented to the hospital with c/o chest pain sob, which started yesterday. Patient noted to be hypotensive.  K 5.4, cR 5.6  Patient received Lokelma.   Troponin's very high  1885    Current Medications:    heparin (porcine) injection 4,000 Units, PRN  heparin (porcine) injection 2,000 Units, PRN  heparin 25,000 units in dextrose 5% 250 mL (premix) infusion, Continuous  glucose chewable tablet 16 g, PRN  dextrose bolus 10% 125 mL, PRN   Or  dextrose bolus 10% 250 mL, PRN  glucagon injection 1 mg, PRN  dextrose 10 % infusion, Continuous PRN  amLODIPine (NORVASC) tablet 5 mg, Daily  vitamin B and C (TOTAL B-C) 1 tablet, Daily  pantoprazole (PROTONIX) tablet 40 mg, QAM AC  atorvastatin (LIPITOR) tablet 40 mg, Daily  chlorproMAZINE (THORAZINE) tablet 25 mg, TID  Vitamin D (CHOLECALCIFEROL) tablet 2,000 Units, Daily  ferrous sulfate (IRON 325) tablet 325 mg, Daily with breakfast  hydrALAZINE

## 2023-11-28 NOTE — CARE COORDINATION
ONGOING DISCHARGE  NOTE:      Writer reviewed notes, Patient is agreeable to [facility] to [facility] and the [address] [facility]   Phone [phone]  Fax [phone]       Patient does not need a pre cert for discharge. Patient is a Bed Hold. IV heparin     IV lasix     Will continue to follow for additional discharge needs. If patient is discharged prior to next notation, then this note serves as note for discharge by case management.     Electronically signed by Kourtney Krishnamurthy RN on 11/28/2023 at 2:47 PM

## 2023-11-28 NOTE — PROGRESS NOTES
17 mmol/L    Glucose 91 70 - 99 mg/dL    BUN 17 6 - 20 mg/dL    Creatinine 3.0 (H) 0.7 - 1.2 mg/dL    Est, Glom Filt Rate 25 (L) >60 mL/min/1.73m2    Calcium 10.7 (H) 8.6 - 10.4 mg/dL   Vitamin B12 & Folate    Collection Time: 11/27/23  6:56 PM   Result Value Ref Range    Vitamin B-12 874 232 - 1245 pg/mL    Folate 6.2 >4.8 ng/mL   APTT    Collection Time: 11/27/23  7:56 PM   Result Value Ref Range    PTT 39.1 (H) 24.0 - 36.0 sec   APTT    Collection Time: 11/28/23  1:45 AM   Result Value Ref Range    PTT 54.1 (H) 24.0 - 36.0 sec   CBC    Collection Time: 11/28/23  5:31 AM   Result Value Ref Range    WBC 4.9 3.5 - 11.0 k/uL    RBC 2.97 (L) 4.5 - 5.9 m/uL    Hemoglobin 9.9 (L) 13.5 - 17.5 g/dL    Hematocrit 30.5 (L) 41 - 53 %    .9 (H) 80 - 100 fL    MCH 33.3 26 - 34 pg    MCHC 32.4 31 - 37 g/dL    RDW 18.2 (H) 11.5 - 14.9 %    Platelets 380 113 - 643 k/uL    MPV 7.5 6.0 - 12.0 fL   Renal Function Panel    Collection Time: 11/28/23  5:31 AM   Result Value Ref Range    Glucose 103 (H) 70 - 99 mg/dL    BUN 23 (H) 6 - 20 mg/dL    Creatinine 3.9 (H) 0.7 - 1.2 mg/dL    Est, Glom Filt Rate 18 (L) >60 mL/min/1.73m2    Calcium 10.6 (H) 8.6 - 10.4 mg/dL    Albumin 3.2 (L) 3.5 - 5.2 g/dL    Phosphorus 4.8 (H) 2.5 - 4.5 mg/dL    Sodium 134 (L) 135 - 144 mmol/L    Potassium 4.2 3.7 - 5.3 mmol/L    Chloride 95 (L) 98 - 107 mmol/L    CO2 26 20 - 31 mmol/L    Anion Gap 13 9 - 17 mmol/L   POC Glucose Fingerstick    Collection Time: 11/28/23  7:12 AM   Result Value Ref Range    POC Glucose 104 75 - 110 mg/dL   APTT    Collection Time: 11/28/23  8:06 AM   Result Value Ref Range    PTT 53.8 (H) 24.0 - 36.0 sec       Imaging/Diagnostics:      Assessment :      Primary Problem  NSTEMI (non-ST elevated myocardial infarction) [facility])    Active Hospital Problems    Diagnosis Date Noted    Coronary artery disease [I25.10] 11/27/2023     Priority: High    Atherosclerotic heart disease [I25.10] 11/27/2023     Priority: High    ESRD on dialysis ([facility]) [N18.6, Z99.2] 11/26/2023     Priority: High    NSTEMI (non-ST elevated myocardial infarction) [facility]) [I21.4] 11/26/2023       Plan:     Patient status Admit as inpatient in the  Progressive Unit/Step down    Admitted chest pain, history of ischemic cardiomyopathy, CABG, ESRD, high risk patient, patient has elevated troponin, cardiology consulted, getting treated in lines of NSTEMI, patient is currently on heparin drip, aspirin, Lipitor, Imdur,  ESRD on hemodialysis, patient missed his dialysis, hyperkalemia, nephrology is aware, was given Lokelma  Ischemic cardiomyopathy, last EF was 20 to 25%, done on 11/22, was given Lasix in the emergency room, will continue  History of recent GI bleed, was evaluated in outlying facility, plan to have EGD and colonoscopy as outpatient   hypertension, controlled  PT/OT consult  Overall prognosis guarded    11/27  Chest Pain - resolved   Hyperkalemia, underwent dialysis yesterday  On Lasix, does not make very good urine  Plan noted for cardiac cath, patient is on heparin drip  Kept n.p.o.  11/28   Evaluated by cardiologist  Patient, had cardiac cath done recently at [address] [place] [address] results of cardiac cath from [facility] cardiology discharge further workup depending on that  Currently on heparin drip  ESRD on hemodialysis, nephrology following  Ischemic cardiomyopathy, on IV Lasix, compensated      Consultations:   IP CONSULT TO CARDIOLOGY  IP CONSULT TO INTERNAL MEDICINE  IP CONSULT TO [address] [place] [address]    Patient is admitted as inpatient status because of co-morbidities listed above, severity of signs and symptoms as outlined, requirement for current medical therapies and most importantly because of direct risk to patient if care not provided in a hospital setting.     Reji Osman MD  11/28/2023  11:17 AM    Copy sent to Dr. Tracie Lamas MD    Please note that this chart was generated using voice recognition Dragon

## 2023-11-28 NOTE — PROGRESS NOTES
[facility]   Progress Note                   Date:   11/28/2023  Patient name: Matt Rene  Date of admission:  11/25/2023 11:43 PM  MRN:   [id number]  YOB: 1975  PCP: May Fraga MD    Reason for Admission:      Subjective:       Clinical Changes / Abnormalities: Pt. Seen & examined alone in room. Denies any further chest pain or pressure. Denies any SOB. States he is very tired today. Labs, vitals, & tele reviewed. Medications:   Scheduled Meds:   amLODIPine  5 mg Oral Daily    vitamin B and C  1 tablet Oral Daily    pantoprazole  40 mg Oral QAM AC    atorvastatin  40 mg Oral Daily    chlorproMAZINE  25 mg Oral TID    Vitamin D  2,000 Units Oral Daily    ferrous sulfate  325 mg Oral Daily with breakfast    hydrALAZINE  25 mg Oral TID    isosorbide mononitrate  30 mg Oral Daily    insulin glargine  8 Units SubCUTAneous Nightly    lanthanum  1,000 mg Oral TID WC    nicotine  1 patch TransDERmal Q24H    OLANZapine  5 mg Oral Nightly    sevelamer  800 mg Oral TID WC    sodium chloride flush  5-40 mL IntraVENous 2 times per day    metoprolol tartrate  25 mg Oral BID    aspirin  81 mg Oral Daily    furosemide  40 mg IntraVENous Once    furosemide  40 mg IntraVENous Daily     Continuous Infusions:   heparin (PORCINE) Infusion 13 Units/kg/hr (11/28/23 0235)    dextrose      sodium chloride       CBC:   Recent Labs     11/26/23  0050 11/27/23  0637 11/28/23  0531   WBC 7.3 5.5 4.9   HGB 10.9* 9.7* 9.9*    225 230       BMP:    Recent Labs     11/26/23  0050 11/26/23  0604 11/27/23  1856 11/28/23  0531   *  --  134* 134*   K 5.5* 5.4* 3.9 4.2   CL 95*  --  93* 95*   CO2 21  --  27 26   BUN 42*  --  17 23*   CREATININE 5.6*  --  3.0* 3.9*   GLUCOSE 157*  --  91 103*       Hepatic:   Recent Labs     11/26/23 0050   AST 51*   ALT 6   BILITOT 0.3   ALKPHOS 345*       Troponin: No results for input(s): \"TROPONINI\" in the last 72 hours. BNP: No results for input(s):  \"BNP\"

## 2023-11-28 NOTE — PROGRESS NOTES
[facility]: DUANEMARCUS LUCEROAJ GUEVARA   Occupational Therapy Evaluation  Date: 23  Patient Name: Ebony Mccollum       Room: 0754/5196-38  MRN: [id number]  Account: [de-identified]   : 1975  (46 y.o.) Gender: male     Discharge Recommendations:  Further Occupational Therapy is recommended upon facility discharge. OT Equipment Recommendations  Other: TBD    Referring Practitioner: America Gonsalez MD  Diagnosis: NSTEMI (non-ST elevated myocardial infarction)      Treatment Diagnosis: Impaired self-care status    Past Medical History:  has a past medical history of Abscess, gluteal, left, CVA (cerebral vascular accident) ([address]), Diabetes mellitus ([address]), End stage renal disease ([address]), All's gangrene of scrotum, GERD (gastroesophageal reflux disease), Hemodialysis patient ([address]), Hyperlipidemia, Hypertension, and Presence of permanent central venous catheter. Past Surgical History:   has a past surgical history that includes Cholecystectomy; Tunneled venous catheter placement; Scrotal surgery (Bilateral, 10/11/2019); Scrotal surgery (Bilateral, 10/15/2019); Testicle removal (Right, 10/18/2019); Scrotal surgery; Dialysis fistula creation (Left, 2020); other surgical history (Left, 2020); other surgical history (2020); other surgical history (2020); and Cardiac surgery. Restrictions  Restrictions/Precautions  Restrictions/Precautions: Fall Risk;General Precautions; Up as Tolerated  Required Braces or Orthoses?: No  Implants present? :  (Pt denies)  Position Activity Restriction  Other position/activity restrictions: Up with assistance      Vitals  Vitals  Pulse: 78  Heart Rate Source: Monitor  BP: (!) 89/63  BP Location: Right upper arm  BP Method: Automatic  Patient Position: Semi fowlers  MAP (Calculated): 72  Respirations: 16  SpO2: 100 %  O2 Device: None (Room air)     Subjective  Subjective: \"I wasn't getting therapy at [facility]\" pt states.  Pt pleasant and agreeable to OT/PT eval Measures  AM-PAC Daily Activity - Inpatient   How much help is needed for putting on and taking off regular lower body clothing?: Total  How much help is needed for bathing (which includes washing, rinsing, drying)?: Total  How much help is needed for toileting (which includes using toilet, bedpan, or urinal)?: Total  How much help is needed for putting on and taking off regular upper body clothing?: A Lot  How much help is needed for taking care of personal grooming?: A Little  How much help for eating meals?: A Little  AMMerged with [facility] Inpatient Daily Activity Raw Score: 11  AM-PAC Inpatient ADL T-Scale Score : 29.04  ADL Inpatient CMS 0-100% Score: 70.42  ADL Inpatient CMS G-Code Modifier : CL    Goals  Short Term Goals  Time Frame for Short Term Goals: By discharge  Short Term Goal 1: Pt will perform bed mobility with SBA to sit EOB 8+ minutes, SBA, during self-care/functional activity  Short Term Goal 2: Pt will perform UB ADLs with Min A, good safety, and use of AE/DME/Modified techniques as needed  Short Term Goal 3: Pt will perfom LB ADLs with Mod A, good safety, and use of AE/DME/Modified techniques as needed  Short Term Goal 4: Pt will demonstrate improved awareness of RUE/LUE AEB ability to maintain safe positioning of R/L hand and wrist during ADL routine without assist  Short Term Goal 5: Pt will participate in 15+ minutes of therapeutic exercise/functional activity including AAROM/PROM RUE for improved functional use  Short Term Goal 6: OT to further assess functional transfers/mobility as able and notify OTR to update goals    Plan  Occupational Therapy Plan  Times Per Week: 5-7  Times Per Day:  Once a day  Current Treatment Recommendations: Self-Care / ADL, Strengthening, ROM, Balance training, Functional mobility training, Endurance training, Neuromuscular re-education, Cognitive reorientation, Pain management, Safety education & training, Patient/Caregiver education & training, Equipment evaluation,

## 2023-11-29 PROBLEM — I15.0 RENOVASCULAR HYPERTENSION: Status: ACTIVE | Noted: 2023-11-29

## 2023-11-29 LAB
ANION GAP SERPL CALCULATED.3IONS-SCNC: 9 MMOL/L (ref 9–17)
ANTI-XA UNFRAC HEPARIN: 0.6 IU/L (ref 0.3–0.7)
BUN SERPL-MCNC: 35 MG/DL (ref 6–20)
CALCIUM SERPL-MCNC: 11 MG/DL (ref 8.6–10.4)
CHLORIDE SERPL-SCNC: 95 MMOL/L (ref 98–107)
CO2 SERPL-SCNC: 27 MMOL/L (ref 20–31)
CREAT SERPL-MCNC: 4.9 MG/DL (ref 0.7–1.2)
GFR SERPL CREATININE-BSD FRML MDRD: 14 ML/MIN/1.73M2
GLUCOSE BLD-MCNC: 144 MG/DL (ref 75–110)
GLUCOSE SERPL-MCNC: 138 MG/DL (ref 70–99)
POTASSIUM SERPL-SCNC: 4.3 MMOL/L (ref 3.7–5.3)
SODIUM SERPL-SCNC: 131 MMOL/L (ref 135–144)

## 2023-11-29 PROCEDURE — 99232 SBSQ HOSP IP/OBS MODERATE 35: CPT | Performed by: INTERNAL MEDICINE

## 2023-11-29 PROCEDURE — 80048 BASIC METABOLIC PNL TOTAL CA: CPT

## 2023-11-29 PROCEDURE — 6370000000 HC RX 637 (ALT 250 FOR IP): Performed by: NURSE PRACTITIONER

## 2023-11-29 PROCEDURE — 6370000000 HC RX 637 (ALT 250 FOR IP): Performed by: INTERNAL MEDICINE

## 2023-11-29 PROCEDURE — 2580000003 HC RX 258: Performed by: NURSE PRACTITIONER

## 2023-11-29 PROCEDURE — 36415 COLL VENOUS BLD VENIPUNCTURE: CPT

## 2023-11-29 PROCEDURE — 2060000000 HC ICU INTERMEDIATE R&B

## 2023-11-29 PROCEDURE — 90935 HEMODIALYSIS ONE EVALUATION: CPT

## 2023-11-29 PROCEDURE — 82947 ASSAY GLUCOSE BLOOD QUANT: CPT

## 2023-11-29 PROCEDURE — 99233 SBSQ HOSP IP/OBS HIGH 50: CPT | Performed by: INTERNAL MEDICINE

## 2023-11-29 PROCEDURE — 6360000002 HC RX W HCPCS: Performed by: INTERNAL MEDICINE

## 2023-11-29 PROCEDURE — 6360000002 HC RX W HCPCS: Performed by: NURSE PRACTITIONER

## 2023-11-29 PROCEDURE — 85520 HEPARIN ASSAY: CPT

## 2023-11-29 RX ADMIN — Medication 1 TABLET: at 14:57

## 2023-11-29 RX ADMIN — HYDRALAZINE HYDROCHLORIDE 25 MG: 25 TABLET, FILM COATED ORAL at 14:45

## 2023-11-29 RX ADMIN — LANTHANUM CARBONATE 1000 MG: 500 TABLET, CHEWABLE ORAL at 18:10

## 2023-11-29 RX ADMIN — AMLODIPINE BESYLATE 5 MG: 5 TABLET ORAL at 14:46

## 2023-11-29 RX ADMIN — HYDRALAZINE HYDROCHLORIDE 25 MG: 25 TABLET, FILM COATED ORAL at 21:15

## 2023-11-29 RX ADMIN — HYDROCODONE BITARTRATE AND ACETAMINOPHEN 1 TABLET: 5; 325 TABLET ORAL at 18:10

## 2023-11-29 RX ADMIN — ASPIRIN 81 MG: 81 TABLET, CHEWABLE ORAL at 14:46

## 2023-11-29 RX ADMIN — MIDODRINE HYDROCHLORIDE 10 MG: 10 TABLET ORAL at 09:07

## 2023-11-29 RX ADMIN — Medication 6 MG: at 21:20

## 2023-11-29 RX ADMIN — PANTOPRAZOLE SODIUM 40 MG: 40 TABLET, DELAYED RELEASE ORAL at 04:38

## 2023-11-29 RX ADMIN — ISOSORBIDE MONONITRATE 30 MG: 30 TABLET, EXTENDED RELEASE ORAL at 14:45

## 2023-11-29 RX ADMIN — SEVELAMER CARBONATE 800 MG: 800 TABLET, FILM COATED ORAL at 14:45

## 2023-11-29 RX ADMIN — Medication 2000 UNITS: at 14:46

## 2023-11-29 RX ADMIN — HEPARIN SODIUM 15 UNITS/KG/HR: 10000 INJECTION, SOLUTION INTRAVENOUS at 03:08

## 2023-11-29 RX ADMIN — HYDROCODONE BITARTRATE AND ACETAMINOPHEN 1 TABLET: 5; 325 TABLET ORAL at 04:35

## 2023-11-29 RX ADMIN — OLANZAPINE 5 MG: 10 TABLET, FILM COATED ORAL at 21:15

## 2023-11-29 RX ADMIN — INSULIN GLARGINE 8 UNITS: 100 INJECTION, SOLUTION SUBCUTANEOUS at 21:14

## 2023-11-29 RX ADMIN — SODIUM CHLORIDE, PRESERVATIVE FREE 10 ML: 5 INJECTION INTRAVENOUS at 21:26

## 2023-11-29 RX ADMIN — CHLORPROMAZINE HYDROCHLORIDE 25 MG: 25 TABLET, FILM COATED ORAL at 21:25

## 2023-11-29 RX ADMIN — FERROUS SULFATE TAB 325 MG (65 MG ELEMENTAL FE) 325 MG: 325 (65 FE) TAB at 14:46

## 2023-11-29 RX ADMIN — METOPROLOL TARTRATE 25 MG: 25 TABLET, FILM COATED ORAL at 21:15

## 2023-11-29 RX ADMIN — SEVELAMER CARBONATE 800 MG: 800 TABLET, FILM COATED ORAL at 18:10

## 2023-11-29 RX ADMIN — FUROSEMIDE 40 MG: 10 INJECTION, SOLUTION INTRAMUSCULAR; INTRAVENOUS at 14:47

## 2023-11-29 RX ADMIN — ATORVASTATIN CALCIUM 40 MG: 40 TABLET, FILM COATED ORAL at 14:46

## 2023-11-29 ASSESSMENT — PAIN DESCRIPTION - DESCRIPTORS: DESCRIPTORS: ACHING

## 2023-11-29 ASSESSMENT — PAIN SCALES - GENERAL
PAINLEVEL_OUTOF10: 10
PAINLEVEL_OUTOF10: 8

## 2023-11-29 ASSESSMENT — PAIN DESCRIPTION - ORIENTATION: ORIENTATION: LEFT

## 2023-11-29 ASSESSMENT — PAIN DESCRIPTION - LOCATION: LOCATION: BACK;ANKLE;FOOT

## 2023-11-29 NOTE — CARE COORDINATION
ONGOING DISCHARGE PLAN:    Patient is alert and oriented x4. Spoke with patient regarding discharge plan and patient confirms that plan is still to discharge to [facility]    Patient is a bedhold     Dialysis today     Cardiology following     Heparin Drip    Will continue to follow for additional discharge needs. If patient is discharged prior to next notation, then this note serves as note for discharge by case management.     Electronically signed by Kiara Edwards RN on 11/29/2023 at 12:44 PM

## 2023-11-29 NOTE — PROGRESS NOTES
RN called Dr. Romelia Swanson regarding RN receiving the [facility] reports from July of 2023. RN awaiting response. RN talked to Dr. Romelia Swanson and he states that the cardiologist will see the records in the morning when they round. Record placed In physical chart.

## 2023-11-29 NOTE — PROGRESS NOTES
[address]    HISTORY AND PHYSICAL EXAMINATION            Date:   11/29/2023  Patient name:  Cuba Caban  Date of admission:  11/25/2023 11:43 PM  MRN:   [id number]  Account:  [de-identified]  YOB: 1975  PCP:    Lauro Gray MD  Room:   2101/2101-01  Code Status:    Full Code    Chief Complaint:     Chief Complaint   Patient presents with    Shortness of Breath    Chest Pain       History Obtained From:     patient, electronic medical record    History of Present Illness: The patient is a 46 y.o.   Non- / non  male who presents with Shortness of Breath and Chest Pain   and he is admitted to the hospital for the management of chest pain  Patient, has past medical history multiple medical problem which include diabetes, hypertension, coronary artery disease s/p CABG, ischemic cardiomyopathy, ESRD on hemodialysis  Patient had a stroke during CABG surgery  Patient has residual weakness on right side, has speech difficulties, he is a resident of nursing home, he was brought to emergency room with complaints of chest pain  Chest pain was intermittent nature mainly in the center of the chest, no radiation of chest pain  Going on for last 7 days  In emergency room, patient was found to have very highly elevated troponins, although he has chronically elevated troponin due to CKD  Of note he missed couple of sessions of dialysis also  Patient is hyperkalemic, nephrology following  Patient started on heparin drip, EKG done in emergency room was negative for ST-T changes  Today morning patient feels the chest pain is improving  Patient was in [facility] in October with upper GI bleed, he could not tell me whether he had scopes done  Hemoglobin is stable      Past Medical History:     Past Medical History:   Diagnosis Date    Abscess, gluteal, left     CVA (cerebral vascular accident) ([address])     Diabetes mellitus ([address])     Dr. Girish Dietrich Anti-XA Unfrac Heparin 0.70 0.30 - 0.70 IU/L   Basic Metabolic Panel    Collection Time: 11/29/23  4:24 AM   Result Value Ref Range    Sodium 131 (L) 135 - 144 mmol/L    Potassium 4.3 3.7 - 5.3 mmol/L    Chloride 95 (L) 98 - 107 mmol/L    CO2 27 20 - 31 mmol/L    Anion Gap 9 9 - 17 mmol/L    Glucose 138 (H) 70 - 99 mg/dL    BUN 35 (H) 6 - 20 mg/dL    Creatinine 4.9 (H) 0.7 - 1.2 mg/dL    Est, Glom Filt Rate 14 (L) >60 mL/min/1.73m2    Calcium 11.0 (H) 8.6 - 10.4 mg/dL   Anti-Xa, Unfractionated Heparin    Collection Time: 11/29/23  4:24 AM   Result Value Ref Range    Anti-XA Unfrac Heparin 0.60 0.30 - 0.70 IU/L   POC Glucose Fingerstick    Collection Time: 11/29/23  6:15 AM   Result Value Ref Range    POC Glucose 144 (H) 75 - 110 mg/dL       Imaging/Diagnostics:      Assessment :      Primary Problem  NSTEMI (non-ST elevated myocardial infarction) [facility])    Active Hospital Problems    Diagnosis Date Noted    Renovascular hypertension [I15.0] 11/29/2023     Priority: High    Coronary artery disease [I25.10] 11/27/2023     Priority: High    Atherosclerotic heart disease [I25.10] 11/27/2023     Priority: High    ESRD on dialysis [facility]) [N18.6, Z99.2] 11/26/2023     Priority: High    NSTEMI (non-ST elevated myocardial infarction) ([address]) [I21.4] 11/26/2023       Plan:     Patient status Admit as inpatient in the  Progressive Unit/Step down    Admitted chest pain, history of ischemic cardiomyopathy, CABG, ESRD, high risk patient, patient has elevated troponin, cardiology consulted, getting treated in lines of NSTEMI, patient is currently on heparin drip, aspirin, Lipitor, Imdur,  ESRD on hemodialysis, patient missed his dialysis, hyperkalemia, nephrology is aware, was given Lokelma  Ischemic cardiomyopathy, last EF was 20 to 25%, done on 11/22, was given Lasix in the emergency room, will continue  History of recent GI bleed, was evaluated in outlying facility, plan to have EGD and colonoscopy as outpatient   hypertension,

## 2023-11-29 NOTE — PROGRESS NOTES
[facility]   Progress Note                   Date:   11/29/2023  Patient name: Bhaskar Mccord  Date of admission:  11/25/2023 11:43 PM  MRN:   [id number]  YOB: 1975  PCP: Tyler Leonardo MD    Reason for Admission:      Subjective:       Clinical Changes / Abnormalities: Patient denies any chest pain pressure tightness  Patient is getting dialysis        Medications:   Scheduled Meds:   amLODIPine  5 mg Oral Daily    vitamin B and C  1 tablet Oral Daily    pantoprazole  40 mg Oral QAM AC    atorvastatin  40 mg Oral Daily    chlorproMAZINE  25 mg Oral TID    Vitamin D  2,000 Units Oral Daily    ferrous sulfate  325 mg Oral Daily with breakfast    hydrALAZINE  25 mg Oral TID    isosorbide mononitrate  30 mg Oral Daily    insulin glargine  8 Units SubCUTAneous Nightly    lanthanum  1,000 mg Oral TID WC    nicotine  1 patch TransDERmal Q24H    OLANZapine  5 mg Oral Nightly    sevelamer  800 mg Oral TID WC    sodium chloride flush  5-40 mL IntraVENous 2 times per day    metoprolol tartrate  25 mg Oral BID    aspirin  81 mg Oral Daily    furosemide  40 mg IntraVENous Once    furosemide  40 mg IntraVENous Daily     Continuous Infusions:   heparin (PORCINE) Infusion 15 Units/kg/hr (11/29/23 0308)    dextrose      sodium chloride       CBC:   Recent Labs     11/27/23  0637 11/28/23  0531   WBC 5.5 4.9   HGB 9.7* 9.9*    230       BMP:    Recent Labs     11/27/23  1856 11/28/23  0531 11/29/23  0424   * 134* 131*   K 3.9 4.2 4.3   CL 93* 95* 95*   CO2 27 26 27   BUN 17 23* 35*   CREATININE 3.0* 3.9* 4.9*   GLUCOSE 91 103* 138*       Hepatic:   No results for input(s): \"AST\", \"ALT\", \"ALB\", \"BILITOT\", \"ALKPHOS\" in the last 72 hours. Troponin: No results for input(s): \"TROPONINI\" in the last 72 hours. BNP: No results for input(s): \"BNP\" in the last 72 hours. Lipids:   Recent Labs     11/27/23  0637   CHOL 104   HDL 70       INR: No results for input(s):  \"INR\" in the last 72

## 2023-11-29 NOTE — PROGRESS NOTES
DATE: 2023    NAME: Ebony Mccollum  MRN: [id number]   : 1975    Patient not seen this date for Physical Therapy due to:      [] Cancel by RN or physician due to:    [x] Hemodialysis    [] Critical Lab Value Level     [] Blood transfusion in progress    [] Acute or unstable cardiovascular status   _MAP < 55 or more than >115  _HR < 40 or > 130    [] Acute or unstable pulmonary status   -FiO2 > 60%   _RR < 5 or >40    _O2 sats < 85%    [] Strict Bedrest    [] Off Unit for surgery or procedure    [] Off Unit for testing       [] Pending imaging to R/O fracture    [] Refusal by Patient      [] Other      [] PT being discontinued at this time. Patient independent. No further needs. [] PT being discontinued at this time as the patient has been transferred to hospice care. No further needs.       Sia Greenberg, PTA

## 2023-11-29 NOTE — PROGRESS NOTES
[address] [facility]   OCCUPATIONAL THERAPY MISSED TREATMENT NOTE   INPATIENT   Date: 23  Patient Name: Deepak Medellin       Room: 7625/3286-81  MRN: [id number]   Account #: [de-identified]    : 1975  (46 y.o.)  Gender: male   Referring Practitioner: Elvira Narayanan MD  Diagnosis: NSTEMI (non-ST elevated myocardial infarction)             REASON FOR MISSED TREATMENT:  Patient at testing and/or off the floor   -   Hemodialysis         Electronically signed by CONG Monk on 23 at 10:22 AM EST

## 2023-11-29 NOTE — PROGRESS NOTES
NEPHROLOGY PROGRESS NOTE     Patient :  Ebony Mccollum; 46 y.o. MRN# [id number]  Location:  2101/2101-01  Attending:  Juliane Vasquez MD  Admit Date:  11/25/2023   Hospital Day: 3    Reason for consultation: Management of hemodialysis dependent end-stage renal disease. Consulting physician: Joshua De Jesus MD.    Chief Complaint:  SOB, Chest pain    Interval history:   Patient was seen and examined today and he appears comfortable. No acute events. History of Present Illness: This is a 46 y.o. male has past medical history multiple medical problem which include diabetes, hypertension, coronary artery disease s/p CABG, ischemic cardiomyopathy, ESRD on hemodialysis  Patient had a stroke during CABG surgery. Patient was recently admitted to [facility] where he was treated for GI bleeding. Patient last dialysis was on 11/23/23 Thursday at [facility], Patient was discharged to Nursing home. Patient was suppose to have dialysis at [facility] on Monday. Patient presented to the hospital with c/o chest pain sob, which started yesterday. Patient noted to be hypotensive.  K 5.4, cR 5.6  Patient received Lokelma.   Troponin's very high  1885    Current Medications:    heparin (porcine) injection 4,000 Units, PRN  heparin (porcine) injection 2,000 Units, PRN  heparin 25,000 units in dextrose 5% 250 mL (premix) infusion, Continuous  glucose chewable tablet 16 g, PRN  dextrose bolus 10% 125 mL, PRN   Or  dextrose bolus 10% 250 mL, PRN  glucagon injection 1 mg, PRN  dextrose 10 % infusion, Continuous PRN  amLODIPine (NORVASC) tablet 5 mg, Daily  vitamin B and C (TOTAL B-C) 1 tablet, Daily  pantoprazole (PROTONIX) tablet 40 mg, QAM AC  atorvastatin (LIPITOR) tablet 40 mg, Daily  chlorproMAZINE (THORAZINE) tablet 25 mg, TID  Vitamin D (CHOLECALCIFEROL) tablet 2,000 Units, Daily  ferrous sulfate (IRON 325) tablet 325 mg, Daily with breakfast  hydrALAZINE (APRESOLINE) tablet 25 mg, TID  isosorbide mononitrate (IMDUR) extended

## 2023-11-29 NOTE — PLAN OF CARE
Problem: Discharge Planning  Goal: Discharge to home or other facility with appropriate resources  11/29/2023 0128 by Christal Gaspar RN  Outcome: Progressing  11/28/2023 1753 by Marcio Carrasquillo RN  Outcome: Progressing     Problem: Safety - Adult  Goal: Free from fall injury  11/29/2023 0128 by Christal Gaspar RN  Outcome: Progressing  11/28/2023 1753 by Marcio Carrasquillo RN  Outcome: Progressing     Problem: ABCDS Injury Assessment  Goal: Absence of physical injury  11/29/2023 0128 by Christal Gaspar RN  Outcome: Progressing  11/28/2023 1753 by Marcio Carrasquillo RN  Outcome: Progressing     Problem: Skin/Tissue Integrity  Goal: Absence of new skin breakdown  Description: 1. Monitor for areas of redness and/or skin breakdown  2. Assess vascular access sites hourly  3. Every 4-6 hours minimum:  Change oxygen saturation probe site  4. Every 4-6 hours:  If on nasal continuous positive airway pressure, respiratory therapy assess nares and determine need for appliance change or resting period.   11/29/2023 0128 by Christal Gaspar RN  Outcome: Progressing  11/28/2023 1753 by Marcio Carrasquillo RN  Outcome: Progressing     Problem: Chronic Conditions and Co-morbidities  Goal: Patient's chronic conditions and co-morbidity symptoms are monitored and maintained or improved  11/29/2023 0128 by Christal Gaspar RN  Outcome: Progressing  11/28/2023 1753 by Marcio Carrasquillo RN  Outcome: Progressing     Problem: Nutrition Deficit:  Goal: Optimize nutritional status  11/29/2023 0128 by Christal Gaspar RN  Outcome: Progressing  11/28/2023 1753 by Marcio Carrasquillo RN  Outcome: Progressing

## 2023-11-30 PROBLEM — I50.21 ACUTE SYSTOLIC CONGESTIVE HEART FAILURE (HCC): Status: ACTIVE | Noted: 2023-11-30

## 2023-11-30 LAB
ANTI-XA UNFRAC HEPARIN: 0.52 IU/L (ref 0.3–0.7)
ERYTHROCYTE [DISTWIDTH] IN BLOOD BY AUTOMATED COUNT: 18.8 % (ref 11.5–14.9)
GLUCOSE BLD-MCNC: 128 MG/DL (ref 75–110)
GLUCOSE BLD-MCNC: 200 MG/DL (ref 75–110)
HCT VFR BLD AUTO: 28.6 % (ref 41–53)
HGB BLD-MCNC: 9.1 G/DL (ref 13.5–17.5)
MCH RBC QN AUTO: 32.4 PG (ref 26–34)
MCHC RBC AUTO-ENTMCNC: 31.9 G/DL (ref 31–37)
MCV RBC AUTO: 101.8 FL (ref 80–100)
OSMOLALITY SERPL: 301 MOSM/KG (ref 275–295)
PLATELET # BLD AUTO: 246 K/UL (ref 150–450)
PMV BLD AUTO: 7.7 FL (ref 6–12)
RBC # BLD AUTO: 2.81 M/UL (ref 4.5–5.9)
WBC OTHER # BLD: 5 K/UL (ref 3.5–11)

## 2023-11-30 PROCEDURE — 85027 COMPLETE CBC AUTOMATED: CPT

## 2023-11-30 PROCEDURE — 97530 THERAPEUTIC ACTIVITIES: CPT

## 2023-11-30 PROCEDURE — 6370000000 HC RX 637 (ALT 250 FOR IP): Performed by: NURSE PRACTITIONER

## 2023-11-30 PROCEDURE — 36415 COLL VENOUS BLD VENIPUNCTURE: CPT

## 2023-11-30 PROCEDURE — 6370000000 HC RX 637 (ALT 250 FOR IP): Performed by: INTERNAL MEDICINE

## 2023-11-30 PROCEDURE — 83930 ASSAY OF BLOOD OSMOLALITY: CPT

## 2023-11-30 PROCEDURE — 99233 SBSQ HOSP IP/OBS HIGH 50: CPT | Performed by: INTERNAL MEDICINE

## 2023-11-30 PROCEDURE — 6360000002 HC RX W HCPCS: Performed by: INTERNAL MEDICINE

## 2023-11-30 PROCEDURE — 2060000000 HC ICU INTERMEDIATE R&B

## 2023-11-30 PROCEDURE — 6360000002 HC RX W HCPCS: Performed by: NURSE PRACTITIONER

## 2023-11-30 PROCEDURE — 82947 ASSAY GLUCOSE BLOOD QUANT: CPT

## 2023-11-30 PROCEDURE — 85520 HEPARIN ASSAY: CPT

## 2023-11-30 RX ADMIN — FERROUS SULFATE TAB 325 MG (65 MG ELEMENTAL FE) 325 MG: 325 (65 FE) TAB at 11:23

## 2023-11-30 RX ADMIN — ASPIRIN 81 MG: 81 TABLET, CHEWABLE ORAL at 11:22

## 2023-11-30 RX ADMIN — OLANZAPINE 5 MG: 10 TABLET, FILM COATED ORAL at 21:42

## 2023-11-30 RX ADMIN — METOPROLOL TARTRATE 25 MG: 25 TABLET, FILM COATED ORAL at 21:41

## 2023-11-30 RX ADMIN — PANTOPRAZOLE SODIUM 40 MG: 40 TABLET, DELAYED RELEASE ORAL at 06:14

## 2023-11-30 RX ADMIN — SEVELAMER CARBONATE 800 MG: 800 TABLET, FILM COATED ORAL at 11:22

## 2023-11-30 RX ADMIN — SEVELAMER CARBONATE 800 MG: 800 TABLET, FILM COATED ORAL at 18:42

## 2023-11-30 RX ADMIN — Medication 2000 UNITS: at 11:22

## 2023-11-30 RX ADMIN — INSULIN GLARGINE 8 UNITS: 100 INJECTION, SOLUTION SUBCUTANEOUS at 21:42

## 2023-11-30 RX ADMIN — CHLORPROMAZINE HYDROCHLORIDE 25 MG: 25 TABLET, FILM COATED ORAL at 21:45

## 2023-11-30 RX ADMIN — HEPARIN SODIUM 15 UNITS/KG/HR: 10000 INJECTION, SOLUTION INTRAVENOUS at 04:10

## 2023-11-30 RX ADMIN — HYDRALAZINE HYDROCHLORIDE 25 MG: 25 TABLET, FILM COATED ORAL at 21:42

## 2023-11-30 RX ADMIN — CHLORPROMAZINE HYDROCHLORIDE 25 MG: 25 TABLET, FILM COATED ORAL at 15:03

## 2023-11-30 RX ADMIN — HYDRALAZINE HYDROCHLORIDE 25 MG: 25 TABLET, FILM COATED ORAL at 11:22

## 2023-11-30 RX ADMIN — CHLORPROMAZINE HYDROCHLORIDE 25 MG: 25 TABLET, FILM COATED ORAL at 11:58

## 2023-11-30 RX ADMIN — Medication 1 TABLET: at 11:58

## 2023-11-30 RX ADMIN — ATORVASTATIN CALCIUM 40 MG: 40 TABLET, FILM COATED ORAL at 11:23

## 2023-11-30 RX ADMIN — LANTHANUM CARBONATE 1000 MG: 500 TABLET, CHEWABLE ORAL at 12:04

## 2023-11-30 RX ADMIN — Medication 6 MG: at 21:41

## 2023-11-30 RX ADMIN — AMLODIPINE BESYLATE 5 MG: 5 TABLET ORAL at 11:22

## 2023-11-30 RX ADMIN — HYDRALAZINE HYDROCHLORIDE 25 MG: 25 TABLET, FILM COATED ORAL at 15:03

## 2023-11-30 RX ADMIN — ISOSORBIDE MONONITRATE 30 MG: 30 TABLET, EXTENDED RELEASE ORAL at 12:01

## 2023-11-30 RX ADMIN — METOPROLOL TARTRATE 25 MG: 25 TABLET, FILM COATED ORAL at 11:19

## 2023-11-30 RX ADMIN — FUROSEMIDE 40 MG: 10 INJECTION, SOLUTION INTRAMUSCULAR; INTRAVENOUS at 12:00

## 2023-11-30 RX ADMIN — HYDROCODONE BITARTRATE AND ACETAMINOPHEN 1 TABLET: 5; 325 TABLET ORAL at 21:41

## 2023-11-30 ASSESSMENT — PAIN DESCRIPTION - LOCATION: LOCATION: BACK

## 2023-11-30 ASSESSMENT — PAIN SCALES - GENERAL
PAINLEVEL_OUTOF10: 9
PAINLEVEL_OUTOF10: 0

## 2023-11-30 ASSESSMENT — PAIN DESCRIPTION - ORIENTATION: ORIENTATION: LOWER

## 2023-11-30 ASSESSMENT — PAIN DESCRIPTION - DESCRIPTORS: DESCRIPTORS: ACHING

## 2023-11-30 NOTE — PROGRESS NOTES
11/30/23 1318    Patient Vitals for the past 96 hrs (Last 3 readings):   Weight   11/30/23 0729 72.8 kg (160 lb 7.9 oz)   11/28/23 0633 74.5 kg (164 lb 3.9 oz)   11/27/23 1318 69.5 kg (153 lb 3.5 oz)       Physical Exam:  GENERAL APPEARANCE: Alert and cooperative, and appears to be in mild respiratory distress. distress. HEAD: normocephalic  EYES: EOMI. Not pale, anicteric   NOSE:  No nasal discharge. CARDIAC: Normal S1 and S2. No S3, S4 or murmurs. Rhythm is regular. LUNGS: diminished breath sounds. ABDOMEN: Generally soft and nontender. Normal bowel sounds. MUSKULOSKELETAL: Adequately aligned spine. No joint erythema or tenderness. EXTREMITIES: + edema  NEURO: right hemiparesis. Labs:   CBC:  Recent Labs     11/28/23  0531 11/30/23  0519   WBC 4.9 5.0   RBC 2.97* 2.81*   HGB 9.9* 9.1*   HCT 30.5* 28.6*   .9* 101.8*   MCH 33.3 32.4   MCHC 32.4 31.9   RDW 18.2* 18.8*    246   MPV 7.5 7.7        BMP:   Recent Labs     11/27/23  1856 11/28/23  0531 11/29/23  0424   * 134* 131*   K 3.9 4.2 4.3   CL 93* 95* 95*   CO2 27 26 27   BUN 17 23* 35*   CREATININE 3.0* 3.9* 4.9*   GLUCOSE 91 103* 138*   CALCIUM 10.7* 10.6* 11.0*       Albumin:   Recent Labs     11/28/23  0531   LABALBU 3.2*      Assessment/plan:    1. End-stage renal disease -  We will maintain MWF hemodialysis schedule using left forearm AV fistula. Renal diet,i.e 2-gram sodium,2-gram potassium,1500 ml fluid restriction,1-gram phosphorus, 1800 KCal and 1.2 gram/kg protein per day. 2.  Hyponatremia - we will treat with fluid restriction 1500 mL/day. Check serum osmolality. 3.  Hypercalcemia likely secondary to immobility. Will check intact PTH and iron and calcium. Serum phosphorus 4.8 mg/dL is within target range. 4.  Macrocytic anemia -  Vitamin Y08 and folic acid levels are within normal.    5.  Heart failure with reduced ejection fraction - agree with plan to transfer patient to [facility].     Prognosis is

## 2023-11-30 NOTE — DISCHARGE INSTR - COC
Continuity of Care Form    Patient Name: Arminda Wills   :  1975  MRN:  [id number]    Admit date:  2023  Discharge date:  ***    Code Status Order: Full Code   Advance Directives:     Admitting Physician:  Zach Caceres MD  PCP: Avery Mcneil MD    Discharging Nurse: [facility] Unit/Room#: 2101/210-01  Discharging Unit Phone Number: ***    Emergency Contact:   Extended Emergency Contact Information  Primary Emergency Contact: [id number] [place] [address] Phone: [phone]  Relation: None    Past Surgical History:  Past Surgical History:   Procedure Laterality Date    CARDIAC SURGERY      CABG x3     CHOLECYSTECTOMY      DIALYSIS FISTULA CREATION Left 2020    LEFT UPPER EXTREMITY AV FISTULA CREATION performed by Dulce Nam MD at [facility] Left 2020    FISTULAGRAM  / DR Abdias Ross    OTHER SURGICAL HISTORY  2020    TUNNEL CATH REMOVAL    OTHER SURGICAL HISTORY  2020    lue avf revision     SCROTAL SURGERY Bilateral 10/11/2019    INCISION AND DRAINAGE SCROTUM    SCROTAL SURGERY Bilateral 10/15/2019    INCISION AND DRAINAGE SCROTUM    SCROTAL SURGERY      several wound vac changes to scrotal groin area     TESTICLE REMOVAL Right 10/18/2019    EXCISION ABSCESS SCROTAL SCROTAL ABSCESS DEBRIDEMENT AND WOUND VAC APPLICATION, RIGHT ORCHIECTOMY;    TUNNELED VENOUS CATHETER [address], dialysis catheter         Immunization History:   Immunization History   Administered Date(s) Administered    COVID-19, MODERNA BLUE border, Primary or Immunocompromised, (age 12y+), IM, 100 mcg/0.5mL 2021, 2021       Active Problems:  Patient Active Problem List   Diagnosis Code    Abscess, gluteal, left L02.31    Acute on chronic kidney failure ([address]) N17.9, N18.9    Diabetes type 2, uncontrolled [id number]    S/P arteriovenous (AV) fistula creation Z98.890    S/P arteriovenous (AV) fistula repair

## 2023-11-30 NOTE — CARE COORDINATION
Writer spoke to Radha Link at [facility] to verify this pt is from facility. Pt is from facility does not need authorization to return. Radha Link requests writer to send updated HGB, recent dialysis flowsheets and hep b surface antigen from last 30 days for [facility] readmit for dialysis at facility. Electronically signed by KADEN Casillas on 11/30/2023 at 1:44 PM    Writer placed call to PCU lead Reshma Ojeda regarding pt transfer to [facility] per Dr. Cristy Alvarez notation. Reshma Ojeda confirms pt is not transferring to [facility] at this time and the plan is to return to [facility] currently. Writer placed perfectserve message to bedside COLE Mott regarding need for Hep B surface antigen lab ordered for dialysis slot at facility.   Electronically signed by KADEN Casillas on 11/30/2023 at 1:54 PM

## 2023-11-30 NOTE — PROGRESS NOTES
Comprehensive Nutrition Assessment    Type and Reason for Visit:  Reassess    Nutrition Recommendations/Plan:   Please consider liberalizing diet to just No Added Salt  Will continue to provide Glucerna all trays     Malnutrition Assessment:  Malnutrition Status: At risk for malnutrition (Comment) (11/27/23 6762)    Context:  Chronic Illness     Findings of the 6 clinical characteristics of malnutrition:  Energy Intake:  Mild decrease in energy intake (Comment)  Weight Loss:  Unable to assess     Body Fat Loss:  Unable to assess     Muscle Mass Loss:  Unable to assess    Fluid Accumulation:  Unable to assess     Strength:  Not Performed    Nutrition Assessment:    Pt consuming more than 50% of food provided and requesting more food. He states he dosen't follow a Renal diet at home. Pt is drinking all of Glucerna supplements provided. Nutrition Related Findings:    mild RLE, Labs: Na/K 131/4.3, Glu 138, (11/28) Phos 4.8, Meds: Renvela, Fosrenal Wound Type: None       Current Nutrition Intake & Therapies:    Average Meal Intake: 51-75%, %     ADULT DIET; Regular; No Added Salt (3-4 gm); Low Potassium (Less than 3000 mg/day); Low Phosphorus (Less than 1000 mg); 1800 ml  Diet NPO  ADULT ORAL NUTRITION SUPPLEMENT; Breakfast, Lunch, Dinner; Diabetic Oral Supplement    Anthropometric Measures:  Height: 170.2 cm (5' 7\")  Ideal Body Weight (IBW): 148 lbs (67 kg)    Admission Body Weight: 69.4 kg (153 lb)  Current Body Weight: 72.8 kg (160 lb 7.9 oz),   IBW. Weight Source: Bed Scale  Current BMI (kg/m2): 25.1  Usual Body Weight: 72.6 kg (160 lb)  % Weight Change (Calculated): -4.4                    BMI Categories: Overweight (BMI 25.0-29. 9)    Estimated Daily Nutrient Needs:  Energy Requirements Based On: Formula  Weight Used for Energy Requirements: Admission  Energy (kcal/day): 1.3g/kg= 2000 kcal  Weight Used for Protein Requirements: Admission  Protein (g/day): 2 g/kg= 140 g     Fluid (ml/day): 1500 or per

## 2023-11-30 NOTE — PROGRESS NOTES
Physical Therapy  Facility/Department: [facility] PROGRESSIVE CARE  Daily Treatment Note  NAME: French Trinidad  : 1975  MRN: [id number]    Date of Service: 2023    Discharge Recommendations:  Patient would benefit from continued therapy after discharge, Therapy recommended at discharge   PT Equipment Recommendations  Equipment Needed: No    Patient Diagnosis(es): The primary encounter diagnosis was NSTEMI (non-ST elevated myocardial infarction) ([address]). A diagnosis of ESRD on dialysis [facility]) was also pertinent to this visit. Assessment   Activity Tolerance: Patient tolerated evaluation without incident;Patient limited by pain; Patient limited by endurance  Equipment Needed: No     Plan    Physical Therapy Plan  General Plan: 5-7 times per week  Current Treatment Recommendations: Strengthening;ROM;Balance training;Functional mobility training;Transfer training; Endurance training; Therapeutic activities     Restrictions  Restrictions/Precautions  Restrictions/Precautions: Fall Risk, General Precautions, Up as Tolerated  Required Braces or Orthoses?: No  Implants present? :  (Pt denies)  Position Activity Restriction  Other position/activity restrictions: Up with assistance     Subjective    Subjective  Subjective: Patient resting in bed upon approach requesting to be tranferred from bed>bedisde chair. COLE Santiago approved co-treat with Sriram Chao. Pain: Pt reports 8/10 pain in back and L foot  Orientation  Overall Orientation Status: Within Functional Limits  Cognition  Overall Cognitive Status: Exceptions  Arousal/Alertness: Delayed responses to stimuli  Following Commands: Follows one step commands with increased time; Follows one step commands with repetition  Attention Span: Attends with cues to redirect  Safety Judgement: Decreased awareness of need for assistance;Decreased awareness of need for safety  Problem Solving: Assistance required to generate solutions;Assistance required to implement

## 2023-11-30 NOTE — PROGRESS NOTES
[address]    HISTORY AND PHYSICAL EXAMINATION            Date:   11/30/2023  Patient name:  Rogerio Sommer  Date of admission:  11/25/2023 11:43 PM  MRN:   [id number]  Account:  [de-identified]  YOB: 1975  PCP:    Moiz Galarza MD  Room:   2101/2101-01  Code Status:    Full Code    Chief Complaint:     Chief Complaint   Patient presents with    Shortness of Breath    Chest Pain       History Obtained From:     patient, electronic medical record    History of Present Illness: The patient is a 46 y.o.   Non- / non  male who presents with Shortness of Breath and Chest Pain   and he is admitted to the hospital for the management of chest pain  Patient, has past medical history multiple medical problem which include diabetes, hypertension, coronary artery disease s/p CABG, ischemic cardiomyopathy, ESRD on hemodialysis  Patient had a stroke during CABG surgery  Patient has residual weakness on right side, has speech difficulties, he is a resident of nursing home, he was brought to emergency room with complaints of chest pain  Chest pain was intermittent nature mainly in the center of the chest, no radiation of chest pain  Going on for last 7 days  In emergency room, patient was found to have very highly elevated troponins, although he has chronically elevated troponin due to CKD  Of note he missed couple of sessions of dialysis also  Patient is hyperkalemic, nephrology following  Patient started on heparin drip, EKG done in emergency room was negative for ST-T changes  Today morning patient feels the chest pain is improving  Patient was in [facility] in October with upper GI bleed, he could not tell me whether he had scopes done  Hemoglobin is stable      Past Medical History:     Past Medical History:   Diagnosis Date    Abscess, gluteal, left     CVA (cerebral vascular accident) ([address])     Diabetes mellitus ([address])     Dr. Chinyere Mosquera cardiac cath  Original plan was to shift patient to [facility]  Patient wife does not want to go to [facility] she had bad experience at [facility]  Will keep n.p.o. Eventually will having cardiac cath tomorrow    Consultations:   IP CONSULT TO CARDIOLOGY  IP CONSULT TO INTERNAL MEDICINE  IP CONSULT TO NEPHROLOGY  IP CONSULT TO CARDIOLOGY  IP CONSULT  [facility]    Patient is admitted as inpatient status because of co-morbidities listed above, severity of signs and symptoms as outlined, requirement for current medical therapies and most importantly because of direct risk to patient if care not provided in a hospital setting. Marisa Andrade MD  11/30/2023  12:21 PM    Copy sent to Dr. Mónica Penaloza MD    Please note that this chart was generated using voice recognition Dragon dictation software. Although every effort was made to ensure the accuracy of this automated transcription, some errors in transcription may have occurred.

## 2023-11-30 NOTE — PLAN OF CARE
Problem: Discharge Planning  Goal: Discharge to home or other facility with appropriate resources  Outcome: Progressing  Flowsheets (Taken 11/30/2023 1734)  Discharge to home or other facility with appropriate resources:   Identify barriers to discharge with patient and caregiver   Arrange for needed discharge resources and transportation as appropriate     Problem: Safety - Adult  Goal: Free from fall injury  Outcome: Progressing  Flowsheets (Taken 11/30/2023 1730)  Free From Fall Injury: Instruct family/caregiver on patient safety     Problem: ABCDS Injury Assessment  Goal: Absence of physical injury  Outcome: Progressing  Flowsheets (Taken 11/29/2023 2000 by Ida Jiang, RN)  Absence of Physical Injury: Implement safety measures based on patient assessment     Problem: Skin/Tissue Integrity  Goal: Absence of new skin breakdown  Description: 1. Monitor for areas of redness and/or skin breakdown  2. Assess vascular access sites hourly  3. Every 4-6 hours minimum:  Change oxygen saturation probe site  4. Every 4-6 hours:  If on nasal continuous positive airway pressure, respiratory therapy assess nares and determine need for appliance change or resting period. Outcome: Progressing  Note: Skin assessment complete. Waffle mattress in place. Pt turned and repositioned every two hours with assistance from staff. Area kept free from moisture. Proper nourishment and fluids encouraged, as appropriate. Skin remains clean, dry, and intact. Will continue to monitor for additional needs and changes in skin breakdown.       Problem: Chronic Conditions and Co-morbidities  Goal: Patient's chronic conditions and co-morbidity symptoms are monitored and maintained or improved  Outcome: Progressing  Flowsheets (Taken 11/29/2023 2000 by Ida Jiang RN)  Care Plan - Patient's Chronic Conditions and Co-Morbidity Symptoms are Monitored and Maintained or Improved: Monitor and assess patient's chronic conditions and comorbid symptoms for stability, deterioration, or improvement     Problem: Nutrition Deficit:  Goal: Optimize nutritional status  Outcome: Progressing  Flowsheets (Taken 11/30/2023 9347 by Benita Vazquez, RD, LD)  Nutrient intake appropriate for improving, restoring, or maintaining nutritional needs: Monitor oral intake, labs, and treatment plans     Problem: Pain  Goal: Verbalizes/displays adequate comfort level or baseline comfort level  Outcome: Progressing  Flowsheets (Taken 11/30/2023 6420)  Verbalizes/displays adequate comfort level or baseline comfort level:   Encourage patient to monitor pain and request assistance   Assess pain using appropriate pain scale   Implement non-pharmacological measures as appropriate and evaluate response

## 2023-11-30 NOTE — PLAN OF CARE
Problem: Discharge Planning  Goal: Discharge to home or other facility with appropriate resources  Outcome: Progressing  Flowsheets (Taken 11/29/2023 2000)  Discharge to home or other facility with appropriate resources: Identify barriers to discharge with patient and caregiver     Problem: Safety - Adult  Goal: Free from fall injury  Outcome: Progressing  Flowsheets (Taken 11/29/2023 2000)  Free From Fall Injury: Instruct family/caregiver on patient safety     Problem: ABCDS Injury Assessment  Goal: Absence of physical injury  Outcome: Progressing  Flowsheets (Taken 11/29/2023 2000)  Absence of Physical Injury: Implement safety measures based on patient assessment     Problem: Skin/Tissue Integrity  Goal: Absence of new skin breakdown  Description: 1. Monitor for areas of redness and/or skin breakdown  2. Assess vascular access sites hourly  3. Every 4-6 hours minimum:  Change oxygen saturation probe site  4. Every 4-6 hours:  If on nasal continuous positive airway pressure, respiratory therapy assess nares and determine need for appliance change or resting period.   Outcome: Progressing     Problem: Chronic Conditions and Co-morbidities  Goal: Patient's chronic conditions and co-morbidity symptoms are monitored and maintained or improved  Outcome: Progressing  Flowsheets (Taken 11/29/2023 2000)  Care Plan - Patient's Chronic Conditions and Co-Morbidity Symptoms are Monitored and Maintained or Improved: Monitor and assess patient's chronic conditions and comorbid symptoms for stability, deterioration, or improvement     Problem: Nutrition Deficit:  Goal: Optimize nutritional status  Outcome: Progressing     Problem: Pain  Goal: Verbalizes/displays adequate comfort level or baseline comfort level  Outcome: Progressing

## 2023-11-30 NOTE — PROGRESS NOTES
[address] [facility]   INPATIENT OCCUPATIONAL THERAPY  PROGRESS NOTE  Date: 2023  Patient Name: Matt Rene       Room:   MRN: [id number]    : 1975  (46 y.o.)  Gender: male   Referring Practitioner: Walt Nageotte, MD  Diagnosis: NSTEMI (non-ST elevated myocardial infarction)      Discharge Recommendations:  Further Occupational Therapy is recommended upon facility discharge. OT Equipment Recommendations  Other: TBD    Restrictions/Precautions  Restrictions/Precautions  Restrictions/Precautions: Fall Risk;General Precautions; Up as Tolerated  Required Braces or Orthoses?: No  Implants present? :  (Pt denies)  Position Activity Restriction  Other position/activity restrictions: Up with assistance    Vitals  O2 Device: None (Room air)    Subjective  Subjective  Subjective: \"I want another person behind me when I stand. \" pt verbalizes anxiety 2* fear of falling prior to standing in [facility]. Pt pleasant and agreeable to tx. Subjective  Pain: Pt reports 8/10 pain in back, lower right and L foot  Comments: Lilibeth GALVAN oks tx. co-tx c PJ PTA    Objective  Orientation  Overall Orientation Status: Within Functional Limits  Cognition  Overall Cognitive Status: Exceptions  Arousal/Alertness: Delayed responses to stimuli  Following Commands: Follows one step commands with increased time; Follows one step commands with repetition  Attention Span: Attends with cues to redirect  Safety Judgement: Decreased awareness of need for assistance;Decreased awareness of need for safety  Problem Solving: Assistance required to generate solutions;Assistance required to implement solutions;Assistance required to identify errors made;Assistance required to correct errors made  Insights: Decreased awareness of deficits  Initiation: Requires cues for some  Sequencing: Requires cues for some    Activities of Daily Living  ADL  Feeding: Minimal assistance  Grooming: Minimal Apixaban/Eliquis is used to treat and prevent blood clots. If you are not able to swallow the tablets whole, they may be crushed and mixed in water, apple juice, or applesauce and promptly taken within four hours. Never skip a dose of Apixaban/Eliquis. If you forget to take your Apixaban/Eliquis, take a dose as soon as you remember. If it is almost time for your next Apixaban/Eliquis dose, wait until then and take a regular dose. DO NOT take an extra pill to ‘catch up’.  NEVER TAKE A DOUBLE DOSE. Notify your doctor that you missed a dose. Take Apixaban/Eliquis at the same time each morning and evening. Apixaban/Eliquis may be taken with other medication or food.

## 2023-12-01 ENCOUNTER — APPOINTMENT (OUTPATIENT)
Dept: VASCULAR LAB | Age: 48
DRG: 280 | End: 2023-12-01
Attending: INTERNAL MEDICINE
Payer: MEDICARE

## 2023-12-01 ENCOUNTER — HOSPITAL ENCOUNTER (INPATIENT)
Age: 48
LOS: 9 days | Discharge: ANOTHER HOSPITAL | DRG: 280 | End: 2023-12-10
Attending: INTERNAL MEDICINE | Admitting: INTERNAL MEDICINE
Payer: MEDICARE

## 2023-12-01 VITALS
HEART RATE: 80 BPM | RESPIRATION RATE: 18 BRPM | BODY MASS INDEX: 25.19 KG/M2 | WEIGHT: 160.5 LBS | SYSTOLIC BLOOD PRESSURE: 110 MMHG | DIASTOLIC BLOOD PRESSURE: 74 MMHG | OXYGEN SATURATION: 100 % | HEIGHT: 67 IN | TEMPERATURE: 97.5 F

## 2023-12-01 DIAGNOSIS — I50.21 ACUTE SYSTOLIC CONGESTIVE HEART FAILURE (HCC): Primary | ICD-10-CM

## 2023-12-01 DIAGNOSIS — I21.4 NSTEMI (NON-ST ELEVATED MYOCARDIAL INFARCTION) (HCC): ICD-10-CM

## 2023-12-01 DIAGNOSIS — I20.9 ANGINA PECTORIS (HCC): ICD-10-CM

## 2023-12-01 DIAGNOSIS — Z95.1 HX OF CABG: ICD-10-CM

## 2023-12-01 PROBLEM — D64.9 ANEMIA: Status: ACTIVE | Noted: 2023-12-01

## 2023-12-01 PROBLEM — E87.1 HYPONATREMIA: Status: ACTIVE | Noted: 2023-12-01

## 2023-12-01 PROBLEM — E11.9 TYPE 2 DIABETES MELLITUS WITHOUT COMPLICATION (HCC): Status: ACTIVE | Noted: 2017-05-27

## 2023-12-01 LAB
ALBUMIN SERPL-MCNC: 3.5 G/DL (ref 3.5–5.2)
ANION GAP SERPL CALCULATED.3IONS-SCNC: 12 MMOL/L (ref 9–17)
ANTI-XA UNFRAC HEPARIN: 0.31 IU/L (ref 0.3–0.7)
BUN SERPL-MCNC: 50 MG/DL (ref 6–20)
CA-I BLD-SCNC: 1.59 MMOL/L (ref 1.13–1.33)
CALCIUM SERPL-MCNC: 11.8 MG/DL (ref 8.6–10.4)
CHLORIDE SERPL-SCNC: 93 MMOL/L (ref 98–107)
CO2 SERPL-SCNC: 28 MMOL/L (ref 20–31)
CREAT SERPL-MCNC: 5.2 MG/DL (ref 0.7–1.2)
ECHO BSA: 1.85 M2
GFR SERPL CREATININE-BSD FRML MDRD: 13 ML/MIN/1.73M2
GLUCOSE BLD-MCNC: 141 MG/DL (ref 75–110)
GLUCOSE BLD-MCNC: 146 MG/DL (ref 75–110)
GLUCOSE SERPL-MCNC: 141 MG/DL (ref 70–99)
PHOSPHATE SERPL-MCNC: 5 MG/DL (ref 2.5–4.5)
POTASSIUM SERPL-SCNC: 5 MMOL/L (ref 3.7–5.3)
PTH-INTACT SERPL-MCNC: 53.7 PG/ML (ref 14–72)
SODIUM SERPL-SCNC: 133 MMOL/L (ref 135–144)

## 2023-12-01 PROCEDURE — 99222 1ST HOSP IP/OBS MODERATE 55: CPT | Performed by: NURSE PRACTITIONER

## 2023-12-01 PROCEDURE — 2709999900 HC NON-CHARGEABLE SUPPLY: Performed by: INTERNAL MEDICINE

## 2023-12-01 PROCEDURE — 85520 HEPARIN ASSAY: CPT

## 2023-12-01 PROCEDURE — 82330 ASSAY OF CALCIUM: CPT

## 2023-12-01 PROCEDURE — 93970 EXTREMITY STUDY: CPT

## 2023-12-01 PROCEDURE — 7100000011 HC PHASE II RECOVERY - ADDTL 15 MIN: Performed by: INTERNAL MEDICINE

## 2023-12-01 PROCEDURE — 99152 MOD SED SAME PHYS/QHP 5/>YRS: CPT | Performed by: INTERNAL MEDICINE

## 2023-12-01 PROCEDURE — 82947 ASSAY GLUCOSE BLOOD QUANT: CPT

## 2023-12-01 PROCEDURE — 83970 ASSAY OF PARATHORMONE: CPT

## 2023-12-01 PROCEDURE — 6370000000 HC RX 637 (ALT 250 FOR IP): Performed by: NURSE PRACTITIONER

## 2023-12-01 PROCEDURE — 6360000002 HC RX W HCPCS: Performed by: INTERNAL MEDICINE

## 2023-12-01 PROCEDURE — 6360000002 HC RX W HCPCS: Performed by: NURSE PRACTITIONER

## 2023-12-01 PROCEDURE — 99153 MOD SED SAME PHYS/QHP EA: CPT | Performed by: INTERNAL MEDICINE

## 2023-12-01 PROCEDURE — 93455 CORONARY ART/GRFT ANGIO S&I: CPT | Performed by: INTERNAL MEDICINE

## 2023-12-01 PROCEDURE — 6370000000 HC RX 637 (ALT 250 FOR IP): Performed by: INTERNAL MEDICINE

## 2023-12-01 PROCEDURE — 2060000000 HC ICU INTERMEDIATE R&B

## 2023-12-01 PROCEDURE — 99233 SBSQ HOSP IP/OBS HIGH 50: CPT | Performed by: INTERNAL MEDICINE

## 2023-12-01 PROCEDURE — 2580000003 HC RX 258: Performed by: STUDENT IN AN ORGANIZED HEALTH CARE EDUCATION/TRAINING PROGRAM

## 2023-12-01 PROCEDURE — 2500000003 HC RX 250 WO HCPCS: Performed by: INTERNAL MEDICINE

## 2023-12-01 PROCEDURE — 7100000010 HC PHASE II RECOVERY - FIRST 15 MIN: Performed by: INTERNAL MEDICINE

## 2023-12-01 PROCEDURE — B3101ZZ FLUOROSCOPY OF THORACIC AORTA USING LOW OSMOLAR CONTRAST: ICD-10-PCS | Performed by: INTERNAL MEDICINE

## 2023-12-01 PROCEDURE — 93880 EXTRACRANIAL BILAT STUDY: CPT

## 2023-12-01 PROCEDURE — 4A023N7 MEASUREMENT OF CARDIAC SAMPLING AND PRESSURE, LEFT HEART, PERCUTANEOUS APPROACH: ICD-10-PCS | Performed by: INTERNAL MEDICINE

## 2023-12-01 PROCEDURE — 6370000000 HC RX 637 (ALT 250 FOR IP): Performed by: STUDENT IN AN ORGANIZED HEALTH CARE EDUCATION/TRAINING PROGRAM

## 2023-12-01 PROCEDURE — B2111ZZ FLUOROSCOPY OF MULTIPLE CORONARY ARTERIES USING LOW OSMOLAR CONTRAST: ICD-10-PCS | Performed by: INTERNAL MEDICINE

## 2023-12-01 PROCEDURE — 93459 L HRT ART/GRFT ANGIO: CPT | Performed by: INTERNAL MEDICINE

## 2023-12-01 PROCEDURE — 36415 COLL VENOUS BLD VENIPUNCTURE: CPT

## 2023-12-01 PROCEDURE — 2580000003 HC RX 258: Performed by: NURSE PRACTITIONER

## 2023-12-01 PROCEDURE — 93930 UPPER EXTREMITY STUDY: CPT

## 2023-12-01 PROCEDURE — 5A1D70Z PERFORMANCE OF URINARY FILTRATION, INTERMITTENT, LESS THAN 6 HOURS PER DAY: ICD-10-PCS | Performed by: INTERNAL MEDICINE

## 2023-12-01 PROCEDURE — 6360000004 HC RX CONTRAST MEDICATION: Performed by: INTERNAL MEDICINE

## 2023-12-01 PROCEDURE — 80069 RENAL FUNCTION PANEL: CPT

## 2023-12-01 RX ORDER — LANTHANUM CARBONATE 500 MG/1
1000 TABLET, CHEWABLE ORAL
Status: DISCONTINUED | OUTPATIENT
Start: 2023-12-02 | End: 2023-12-10 | Stop reason: HOSPADM

## 2023-12-01 RX ORDER — DEXTROSE MONOHYDRATE 100 MG/ML
INJECTION, SOLUTION INTRAVENOUS CONTINUOUS PRN
Status: DISCONTINUED | OUTPATIENT
Start: 2023-12-01 | End: 2023-12-10 | Stop reason: HOSPADM

## 2023-12-01 RX ORDER — ALBUMIN (HUMAN) 12.5 G/50ML
25 SOLUTION INTRAVENOUS PRN
Status: DISCONTINUED | OUTPATIENT
Start: 2023-12-01 | End: 2023-12-10 | Stop reason: HOSPADM

## 2023-12-01 RX ORDER — VITAMIN B COMPLEX
2000 TABLET ORAL DAILY
Status: DISCONTINUED | OUTPATIENT
Start: 2023-12-02 | End: 2023-12-06

## 2023-12-01 RX ORDER — ISOSORBIDE MONONITRATE 30 MG/1
30 TABLET, EXTENDED RELEASE ORAL DAILY
Status: DISCONTINUED | OUTPATIENT
Start: 2023-12-02 | End: 2023-12-10 | Stop reason: HOSPADM

## 2023-12-01 RX ORDER — HYDRALAZINE HYDROCHLORIDE 25 MG/1
25 TABLET, FILM COATED ORAL 3 TIMES DAILY
Status: DISCONTINUED | OUTPATIENT
Start: 2023-12-01 | End: 2023-12-02

## 2023-12-01 RX ORDER — FUROSEMIDE 10 MG/ML
40 INJECTION INTRAMUSCULAR; INTRAVENOUS DAILY
Status: DISCONTINUED | OUTPATIENT
Start: 2023-12-02 | End: 2023-12-03

## 2023-12-01 RX ORDER — ACETAMINOPHEN 325 MG/1
650 TABLET ORAL EVERY 4 HOURS PRN
Status: DISCONTINUED | OUTPATIENT
Start: 2023-12-01 | End: 2023-12-02 | Stop reason: SDUPTHER

## 2023-12-01 RX ORDER — SODIUM CHLORIDE 0.9 % (FLUSH) 0.9 %
5-40 SYRINGE (ML) INJECTION EVERY 12 HOURS SCHEDULED
Status: DISCONTINUED | OUTPATIENT
Start: 2023-12-01 | End: 2023-12-10 | Stop reason: HOSPADM

## 2023-12-01 RX ORDER — CHLORPROMAZINE HYDROCHLORIDE 25 MG/1
25 TABLET, FILM COATED ORAL 3 TIMES DAILY
Status: DISCONTINUED | OUTPATIENT
Start: 2023-12-01 | End: 2023-12-10 | Stop reason: HOSPADM

## 2023-12-01 RX ORDER — MIDODRINE HYDROCHLORIDE 5 MG/1
10 TABLET ORAL PRN
Status: DISCONTINUED | OUTPATIENT
Start: 2023-12-01 | End: 2023-12-03

## 2023-12-01 RX ORDER — LABETALOL HYDROCHLORIDE 5 MG/ML
20 INJECTION, SOLUTION INTRAVENOUS EVERY 6 HOURS PRN
Status: DISCONTINUED | OUTPATIENT
Start: 2023-12-01 | End: 2023-12-03

## 2023-12-01 RX ORDER — ASPIRIN 81 MG/1
81 TABLET, CHEWABLE ORAL DAILY
Status: DISCONTINUED | OUTPATIENT
Start: 2023-12-02 | End: 2023-12-10 | Stop reason: HOSPADM

## 2023-12-01 RX ORDER — ATORVASTATIN CALCIUM 80 MG/1
80 TABLET, FILM COATED ORAL DAILY
Status: DISCONTINUED | OUTPATIENT
Start: 2023-12-02 | End: 2023-12-10 | Stop reason: HOSPADM

## 2023-12-01 RX ORDER — PANTOPRAZOLE SODIUM 40 MG/1
40 TABLET, DELAYED RELEASE ORAL
Status: DISCONTINUED | OUTPATIENT
Start: 2023-12-02 | End: 2023-12-10 | Stop reason: HOSPADM

## 2023-12-01 RX ORDER — VITAMIN C
1 TAB ORAL DAILY
Status: DISCONTINUED | OUTPATIENT
Start: 2023-12-02 | End: 2023-12-10 | Stop reason: HOSPADM

## 2023-12-01 RX ORDER — SODIUM CHLORIDE 0.9 % (FLUSH) 0.9 %
5-40 SYRINGE (ML) INJECTION EVERY 12 HOURS SCHEDULED
Status: DISCONTINUED | OUTPATIENT
Start: 2023-12-01 | End: 2023-12-04 | Stop reason: SDUPTHER

## 2023-12-01 RX ORDER — LANOLIN ALCOHOL/MO/W.PET/CERES
6 CREAM (GRAM) TOPICAL NIGHTLY PRN
Status: DISCONTINUED | OUTPATIENT
Start: 2023-12-01 | End: 2023-12-07

## 2023-12-01 RX ORDER — MIDAZOLAM HYDROCHLORIDE 1 MG/ML
INJECTION INTRAMUSCULAR; INTRAVENOUS PRN
Status: DISCONTINUED | OUTPATIENT
Start: 2023-12-01 | End: 2023-12-01 | Stop reason: HOSPADM

## 2023-12-01 RX ORDER — INSULIN LISPRO 100 [IU]/ML
0-4 INJECTION, SOLUTION INTRAVENOUS; SUBCUTANEOUS
Status: DISCONTINUED | OUTPATIENT
Start: 2023-12-02 | End: 2023-12-10 | Stop reason: HOSPADM

## 2023-12-01 RX ORDER — LANOLIN ALCOHOL/MO/W.PET/CERES
325 CREAM (GRAM) TOPICAL
Status: DISCONTINUED | OUTPATIENT
Start: 2023-12-02 | End: 2023-12-10 | Stop reason: HOSPADM

## 2023-12-01 RX ORDER — SODIUM CHLORIDE 9 MG/ML
INJECTION, SOLUTION INTRAVENOUS PRN
Status: DISCONTINUED | OUTPATIENT
Start: 2023-12-01 | End: 2023-12-10 | Stop reason: HOSPADM

## 2023-12-01 RX ORDER — NITROGLYCERIN 0.4 MG/1
0.4 TABLET SUBLINGUAL EVERY 5 MIN PRN
Status: DISCONTINUED | OUTPATIENT
Start: 2023-12-01 | End: 2023-12-10 | Stop reason: HOSPADM

## 2023-12-01 RX ORDER — SODIUM CHLORIDE 9 MG/ML
INJECTION, SOLUTION INTRAVENOUS CONTINUOUS
Status: DISCONTINUED | OUTPATIENT
Start: 2023-12-01 | End: 2023-12-01

## 2023-12-01 RX ORDER — AMLODIPINE BESYLATE 5 MG/1
5 TABLET ORAL DAILY
Status: DISCONTINUED | OUTPATIENT
Start: 2023-12-02 | End: 2023-12-02

## 2023-12-01 RX ORDER — INSULIN GLARGINE 100 [IU]/ML
8 INJECTION, SOLUTION SUBCUTANEOUS NIGHTLY
Status: DISCONTINUED | OUTPATIENT
Start: 2023-12-01 | End: 2023-12-01

## 2023-12-01 RX ORDER — HEPARIN SODIUM 1000 [USP'U]/ML
4000 INJECTION, SOLUTION INTRAVENOUS; SUBCUTANEOUS PRN
Status: DISCONTINUED | OUTPATIENT
Start: 2023-12-01 | End: 2023-12-10 | Stop reason: HOSPADM

## 2023-12-01 RX ORDER — OLANZAPINE 5 MG/1
5 TABLET ORAL NIGHTLY
Status: DISCONTINUED | OUTPATIENT
Start: 2023-12-01 | End: 2023-12-10 | Stop reason: HOSPADM

## 2023-12-01 RX ORDER — SEVELAMER CARBONATE 800 MG/1
800 TABLET, FILM COATED ORAL
Status: DISCONTINUED | OUTPATIENT
Start: 2023-12-02 | End: 2023-12-10 | Stop reason: HOSPADM

## 2023-12-01 RX ORDER — ACETAMINOPHEN 325 MG/1
650 TABLET ORAL EVERY 6 HOURS PRN
Status: DISCONTINUED | OUTPATIENT
Start: 2023-12-01 | End: 2023-12-10 | Stop reason: HOSPADM

## 2023-12-01 RX ORDER — SODIUM CHLORIDE 0.9 % (FLUSH) 0.9 %
5-40 SYRINGE (ML) INJECTION PRN
Status: DISCONTINUED | OUTPATIENT
Start: 2023-12-01 | End: 2023-12-10 | Stop reason: HOSPADM

## 2023-12-01 RX ORDER — ACETAMINOPHEN 650 MG/1
650 SUPPOSITORY RECTAL EVERY 6 HOURS PRN
Status: DISCONTINUED | OUTPATIENT
Start: 2023-12-01 | End: 2023-12-10 | Stop reason: HOSPADM

## 2023-12-01 RX ORDER — HYDROCODONE BITARTRATE AND ACETAMINOPHEN 5; 325 MG/1; MG/1
1 TABLET ORAL EVERY 6 HOURS PRN
Status: DISCONTINUED | OUTPATIENT
Start: 2023-12-01 | End: 2023-12-10 | Stop reason: HOSPADM

## 2023-12-01 RX ORDER — HEPARIN SODIUM 10000 [USP'U]/100ML
5-30 INJECTION, SOLUTION INTRAVENOUS CONTINUOUS
Status: DISCONTINUED | OUTPATIENT
Start: 2023-12-01 | End: 2023-12-10 | Stop reason: HOSPADM

## 2023-12-01 RX ORDER — INSULIN LISPRO 100 [IU]/ML
0-4 INJECTION, SOLUTION INTRAVENOUS; SUBCUTANEOUS NIGHTLY
Status: DISCONTINUED | OUTPATIENT
Start: 2023-12-01 | End: 2023-12-10 | Stop reason: HOSPADM

## 2023-12-01 RX ORDER — SODIUM CHLORIDE 0.9 % (FLUSH) 0.9 %
10 SYRINGE (ML) INJECTION PRN
Status: DISCONTINUED | OUTPATIENT
Start: 2023-12-01 | End: 2023-12-10 | Stop reason: HOSPADM

## 2023-12-01 RX ORDER — HEPARIN SODIUM 1000 [USP'U]/ML
2000 INJECTION, SOLUTION INTRAVENOUS; SUBCUTANEOUS PRN
Status: DISCONTINUED | OUTPATIENT
Start: 2023-12-01 | End: 2023-12-10 | Stop reason: HOSPADM

## 2023-12-01 RX ORDER — CINACALCET 30 MG/1
30 TABLET, FILM COATED ORAL DAILY
Status: DISCONTINUED | OUTPATIENT
Start: 2023-12-01 | End: 2023-12-01 | Stop reason: HOSPADM

## 2023-12-01 RX ADMIN — SODIUM CHLORIDE, PRESERVATIVE FREE 10 ML: 5 INJECTION INTRAVENOUS at 22:16

## 2023-12-01 RX ADMIN — HYDROCODONE BITARTRATE AND ACETAMINOPHEN 1 TABLET: 5; 325 TABLET ORAL at 22:13

## 2023-12-01 RX ADMIN — CHLORPROMAZINE HYDROCHLORIDE 25 MG: 25 TABLET, FILM COATED ORAL at 22:14

## 2023-12-01 RX ADMIN — HYDRALAZINE HYDROCHLORIDE 25 MG: 25 TABLET, FILM COATED ORAL at 22:14

## 2023-12-01 RX ADMIN — FUROSEMIDE 40 MG: 10 INJECTION, SOLUTION INTRAMUSCULAR; INTRAVENOUS at 08:31

## 2023-12-01 RX ADMIN — SODIUM ZIRCONIUM CYCLOSILICATE 10 G: 10 POWDER, FOR SUSPENSION ORAL at 22:26

## 2023-12-01 RX ADMIN — OLANZAPINE 5 MG: 5 TABLET, FILM COATED ORAL at 22:14

## 2023-12-01 RX ADMIN — Medication 6 MG: at 22:54

## 2023-12-01 RX ADMIN — METOPROLOL TARTRATE 25 MG: 25 TABLET ORAL at 22:16

## 2023-12-01 RX ADMIN — HEPARIN SODIUM 15 UNITS/KG/HR: 10000 INJECTION, SOLUTION INTRAVENOUS at 02:42

## 2023-12-01 RX ADMIN — SODIUM CHLORIDE, PRESERVATIVE FREE 10 ML: 5 INJECTION INTRAVENOUS at 08:32

## 2023-12-01 ASSESSMENT — PAIN DESCRIPTION - ORIENTATION: ORIENTATION: LEFT

## 2023-12-01 ASSESSMENT — PAIN DESCRIPTION - DESCRIPTORS: DESCRIPTORS: DISCOMFORT;BURNING

## 2023-12-01 ASSESSMENT — PAIN SCALES - PAIN ASSESSMENT IN ADVANCED DEMENTIA (PAINAD)
FACIALEXPRESSION: 1
CONSOLABILITY: 0
BREATHING: 0
NEGVOCALIZATION: 0
BODYLANGUAGE: 0
TOTALSCORE: 1

## 2023-12-01 ASSESSMENT — PAIN DESCRIPTION - LOCATION: LOCATION: BUTTOCKS;LEG

## 2023-12-01 ASSESSMENT — PAIN SCALES - GENERAL: PAINLEVEL_OUTOF10: 9

## 2023-12-01 NOTE — PROGRESS NOTES
Physical Therapy        Physical Therapy Cancel Note      DATE: 2023    NAME: Marko Gomez  MRN: [id number]   : 1975      Patient not seen this date for Physical Therapy due to:    Hemodialysis: plus procedure; Cx; per RN Lc Bassett patient to be having heart cath and hemodialysis very soon. Will attempt later in the day if time permits. Will continue to follow for PT needs.       Electronically signed by Zella Cabot, PTA on 2023 at 11:18 AM

## 2023-12-01 NOTE — PROGRESS NOTES
[facility]   Progress Note                   Date:   12/1/2023  Patient name: Wilber Gill  Date of admission:  11/25/2023 11:43 PM  MRN:   [id number]  YOB: 1975  PCP: Pushpa Grurola MD    Reason for Admission:      Subjective:       Clinical Changes / Abnormalities:   No chest pain or shortness of breath  N.p.o. for cardiac catheterization today  Family refusing to go to [facility], only wanted pursue cardiac cath with us      Medications:   Scheduled Meds:   amLODIPine  5 mg Oral Daily    vitamin B and C  1 tablet Oral Daily    pantoprazole  40 mg Oral QAM AC    atorvastatin  40 mg Oral Daily    chlorproMAZINE  25 mg Oral TID    Vitamin D  2,000 Units Oral Daily    ferrous sulfate  325 mg Oral Daily with breakfast    hydrALAZINE  25 mg Oral TID    isosorbide mononitrate  30 mg Oral Daily    insulin glargine  8 Units SubCUTAneous Nightly    lanthanum  1,000 mg Oral TID WC    nicotine  1 patch TransDERmal Q24H    OLANZapine  5 mg Oral Nightly    sevelamer  800 mg Oral TID WC    sodium chloride flush  5-40 mL IntraVENous 2 times per day    metoprolol tartrate  25 mg Oral BID    aspirin  81 mg Oral Daily    furosemide  40 mg IntraVENous Once    furosemide  40 mg IntraVENous Daily     Continuous Infusions:   heparin (PORCINE) Infusion 15 Units/kg/hr (12/01/23 0242)    dextrose      sodium chloride       CBC:   Recent Labs     11/30/23  0519   WBC 5.0   HGB 9.1*          BMP:    Recent Labs     11/29/23  0424 12/01/23  0523   * 133*   K 4.3 5.0   CL 95* 93*   CO2 27 28   BUN 35* 50*   CREATININE 4.9* 5.2*   GLUCOSE 138* 141*       Hepatic:   No results for input(s): \"AST\", \"ALT\", \"ALB\", \"BILITOT\", \"ALKPHOS\" in the last 72 hours. Troponin: No results for input(s): \"TROPONINI\" in the last 72 hours. BNP: No results for input(s): \"BNP\" in the last 72 hours. Lipids:   No results for input(s): \"CHOL\", \"HDL\" in the last 72 hours.     Invalid input(s): \"LDLCALCU\"    INR: No results

## 2023-12-01 NOTE — PLAN OF CARE
Problem: Discharge Planning  Goal: Discharge to home or other facility with appropriate resources  12/1/2023 0514 by Deejay Elizabeth RN  Outcome: Progressing     Problem: Safety - Adult  Goal: Free from fall injury  12/1/2023 0514 by Deejay Elizabeth RN  Outcome: Progressing     Problem: ABCDS Injury Assessment  Goal: Absence of physical injury  12/1/2023 0514 by Deejay Elizabeth RN  Outcome: Progressing     Problem: Skin/Tissue Integrity  Goal: Absence of new skin breakdown  Description: 1. Monitor for areas of redness and/or skin breakdown  2. Assess vascular access sites hourly  3. Every 4-6 hours minimum:  Change oxygen saturation probe site  4. Every 4-6 hours:  If on nasal continuous positive airway pressure, respiratory therapy assess nares and determine need for appliance change or resting period.   12/1/2023 0514 by Deejay Elizabeth RN  Outcome: Progressing     Problem: Chronic Conditions and Co-morbidities  Goal: Patient's chronic conditions and co-morbidity symptoms are monitored and maintained or improved  12/1/2023 0514 by Deejay Elizabeth RN  Outcome: Progressing     Problem: Nutrition Deficit:  Goal: Optimize nutritional status  12/1/2023 0514 by Deejay Elizabeth RN  Outcome: Progressing     Problem: Pain  Goal: Verbalizes/displays adequate comfort level or baseline comfort level  12/1/2023 0514 by Deejay Elizabeth RN  Outcome: Progressing

## 2023-12-01 NOTE — CARE COORDINATION
Writer is following for potential discharge to [facility]. Facility needs HD information for [employer] approval to resume treatment at facility. Writer faxed documents available to [phone] per Noemi Tavarez.   Electronically signed by KADEN Viera on 12/1/2023 at 4:31 PM

## 2023-12-02 ENCOUNTER — APPOINTMENT (OUTPATIENT)
Dept: CT IMAGING | Age: 48
DRG: 280 | End: 2023-12-02
Attending: INTERNAL MEDICINE
Payer: MEDICARE

## 2023-12-02 ENCOUNTER — APPOINTMENT (OUTPATIENT)
Age: 48
DRG: 280 | End: 2023-12-02
Attending: INTERNAL MEDICINE
Payer: MEDICARE

## 2023-12-02 ENCOUNTER — APPOINTMENT (OUTPATIENT)
Dept: DIALYSIS | Age: 48
DRG: 280 | End: 2023-12-02
Attending: INTERNAL MEDICINE
Payer: MEDICARE

## 2023-12-02 LAB
ALBUMIN SERPL-MCNC: 3.5 G/DL (ref 3.5–5.2)
ALBUMIN/GLOB SERPL: 1.1 {RATIO} (ref 1–2.5)
ALP SERPL-CCNC: 396 U/L (ref 40–129)
ALT SERPL-CCNC: 10 U/L (ref 5–41)
ANION GAP SERPL CALCULATED.3IONS-SCNC: 17 MMOL/L (ref 9–17)
ANTI-XA UNFRAC HEPARIN: 0.23 IU/L
ANTI-XA UNFRAC HEPARIN: 0.25 IU/L
ANTI-XA UNFRAC HEPARIN: 0.34 IU/L
AST SERPL-CCNC: 16 U/L
BILIRUB SERPL-MCNC: 0.3 MG/DL (ref 0.3–1.2)
BUN SERPL-MCNC: 57 MG/DL (ref 6–20)
CALCIUM SERPL-MCNC: 11.7 MG/DL (ref 8.6–10.4)
CHLORIDE SERPL-SCNC: 92 MMOL/L (ref 98–107)
CHOLEST SERPL-MCNC: 124 MG/DL
CHOLESTEROL/HDL RATIO: 1.4
CO2 SERPL-SCNC: 23 MMOL/L (ref 20–31)
CREAT SERPL-MCNC: 5.6 MG/DL (ref 0.7–1.2)
ECHO AO ROOT DIAM: 2.6 CM
ECHO AO ROOT INDEX: 1.41 CM/M2
ECHO AV MEAN GRADIENT: 3 MMHG
ECHO AV MEAN VELOCITY: 0.8 M/S
ECHO AV PEAK GRADIENT: 5 MMHG
ECHO AV PEAK VELOCITY: 1.2 M/S
ECHO AV VELOCITY RATIO: 0.75
ECHO AV VTI: 22.1 CM
ECHO BSA: 1.85 M2
ECHO EST RA PRESSURE: 15 MMHG
ECHO LA AREA 2C: 26.2 CM2
ECHO LA AREA 4C: 20.1 CM2
ECHO LA DIAMETER INDEX: 2.23 CM/M2
ECHO LA DIAMETER: 4.1 CM
ECHO LA MAJOR AXIS: 5.4 CM
ECHO LA MINOR AXIS: 5.7 CM
ECHO LA TO AORTIC ROOT RATIO: 1.58
ECHO LA VOL BP: 79 ML (ref 18–58)
ECHO LA VOL MOD A2C: 99 ML (ref 18–58)
ECHO LA VOL MOD A4C: 61 ML (ref 18–58)
ECHO LA VOL/BSA BIPLANE: 43 ML/M2 (ref 16–34)
ECHO LA VOLUME INDEX MOD A2C: 54 ML/M2 (ref 16–34)
ECHO LA VOLUME INDEX MOD A4C: 33 ML/M2 (ref 16–34)
ECHO LV E' LATERAL VELOCITY: 7 CM/S
ECHO LV EDV A2C: 169 ML
ECHO LV EDV A4C: 126 ML
ECHO LV EDV INDEX A4C: 68 ML/M2
ECHO LV EDV NDEX A2C: 92 ML/M2
ECHO LV EJECTION FRACTION A2C: 26 %
ECHO LV EJECTION FRACTION A4C: 24 %
ECHO LV ESV A2C: 125 ML
ECHO LV ESV A4C: 96 ML
ECHO LV ESV INDEX A2C: 68 ML/M2
ECHO LV ESV INDEX A4C: 52 ML/M2
ECHO LV FRACTIONAL SHORTENING: 17 % (ref 28–44)
ECHO LV INTERNAL DIMENSION DIASTOLE INDEX: 3.59 CM/M2
ECHO LV INTERNAL DIMENSION DIASTOLIC: 6.6 CM (ref 4.2–5.9)
ECHO LV INTERNAL DIMENSION SYSTOLIC INDEX: 2.99 CM/M2
ECHO LV INTERNAL DIMENSION SYSTOLIC: 5.5 CM
ECHO LV IVSD: 0.7 CM (ref 0.6–1)
ECHO LV MASS 2D: 203.6 G (ref 88–224)
ECHO LV MASS INDEX 2D: 110.6 G/M2 (ref 49–115)
ECHO LV POSTERIOR WALL DIASTOLIC: 0.8 CM (ref 0.6–1)
ECHO LV RELATIVE WALL THICKNESS RATIO: 0.24
ECHO LVOT AV VTI INDEX: 0.74
ECHO LVOT MEAN GRADIENT: 1 MMHG
ECHO LVOT PEAK GRADIENT: 3 MMHG
ECHO LVOT PEAK VELOCITY: 0.9 M/S
ECHO LVOT VTI: 16.3 CM
ECHO MV A VELOCITY: 0.59 M/S
ECHO MV E DECELERATION TIME (DT): 177 MS
ECHO MV E VELOCITY: 1.31 M/S
ECHO MV E/A RATIO: 2.22
ECHO MV E/E' LATERAL: 18.71
ECHO MV LVOT VTI INDEX: 1.94
ECHO MV MAX VELOCITY: 1.3 M/S
ECHO MV MEAN GRADIENT: 3 MMHG
ECHO MV MEAN VELOCITY: 0.8 M/S
ECHO MV PEAK GRADIENT: 7 MMHG
ECHO MV VTI: 31.6 CM
ECHO PV MAX VELOCITY: 0.6 M/S
ECHO PV PEAK GRADIENT: 2 MMHG
ECHO RIGHT VENTRICULAR SYSTOLIC PRESSURE (RVSP): 38 MMHG
ECHO RV FREE WALL PEAK S': 6 CM/S
ECHO RV INTERNAL DIMENSION: 5.3 CM
ECHO RV TAPSE: 1.1 CM (ref 1.7–?)
ECHO TV REGURGITANT MAX VELOCITY: 2.39 M/S
ECHO TV REGURGITANT PEAK GRADIENT: 23 MMHG
ERYTHROCYTE [DISTWIDTH] IN BLOOD BY AUTOMATED COUNT: 17.2 % (ref 11.8–14.4)
GFR SERPL CREATININE-BSD FRML MDRD: 12 ML/MIN/1.73M2
GLUCOSE BLD-MCNC: 123 MG/DL (ref 75–110)
GLUCOSE BLD-MCNC: 128 MG/DL (ref 75–110)
GLUCOSE BLD-MCNC: 207 MG/DL (ref 75–110)
GLUCOSE SERPL-MCNC: 119 MG/DL (ref 70–99)
HCT VFR BLD AUTO: 30.1 % (ref 40.7–50.3)
HDLC SERPL-MCNC: 89 MG/DL
HGB BLD-MCNC: 9.1 G/DL (ref 13–17)
INR PPP: 1.3
LDLC SERPL CALC-MCNC: 31 MG/DL (ref 0–130)
MCH RBC QN AUTO: 32.9 PG (ref 25.2–33.5)
MCHC RBC AUTO-ENTMCNC: 30.2 G/DL (ref 28.4–34.8)
MCV RBC AUTO: 108.7 FL (ref 82.6–102.9)
NRBC BLD-RTO: 0 PER 100 WBC
PLATELET # BLD AUTO: 211 K/UL (ref 138–453)
PMV BLD AUTO: 10 FL (ref 8.1–13.5)
POTASSIUM SERPL-SCNC: 5.4 MMOL/L (ref 3.7–5.3)
POTASSIUM SERPL-SCNC: 5.7 MMOL/L (ref 3.7–5.3)
PROT SERPL-MCNC: 6.6 G/DL (ref 6.4–8.3)
PROTHROMBIN TIME: 15.5 SEC (ref 11.7–14.9)
RBC # BLD AUTO: 2.77 M/UL (ref 4.21–5.77)
SODIUM SERPL-SCNC: 132 MMOL/L (ref 135–144)
TRIGL SERPL-MCNC: 21 MG/DL
WBC OTHER # BLD: 5.9 K/UL (ref 3.5–11.3)

## 2023-12-02 PROCEDURE — 93306 TTE W/DOPPLER COMPLETE: CPT

## 2023-12-02 PROCEDURE — 99233 SBSQ HOSP IP/OBS HIGH 50: CPT | Performed by: SURGERY

## 2023-12-02 PROCEDURE — 2580000003 HC RX 258: Performed by: STUDENT IN AN ORGANIZED HEALTH CARE EDUCATION/TRAINING PROGRAM

## 2023-12-02 PROCEDURE — 6370000000 HC RX 637 (ALT 250 FOR IP): Performed by: STUDENT IN AN ORGANIZED HEALTH CARE EDUCATION/TRAINING PROGRAM

## 2023-12-02 PROCEDURE — 85610 PROTHROMBIN TIME: CPT

## 2023-12-02 PROCEDURE — 85027 COMPLETE CBC AUTOMATED: CPT

## 2023-12-02 PROCEDURE — 80053 COMPREHEN METABOLIC PANEL: CPT

## 2023-12-02 PROCEDURE — 82947 ASSAY GLUCOSE BLOOD QUANT: CPT

## 2023-12-02 PROCEDURE — 6370000000 HC RX 637 (ALT 250 FOR IP)

## 2023-12-02 PROCEDURE — 2580000003 HC RX 258

## 2023-12-02 PROCEDURE — 71250 CT THORAX DX C-: CPT

## 2023-12-02 PROCEDURE — 84132 ASSAY OF SERUM POTASSIUM: CPT

## 2023-12-02 PROCEDURE — 5A1D70Z PERFORMANCE OF URINARY FILTRATION, INTERMITTENT, LESS THAN 6 HOURS PER DAY: ICD-10-PCS | Performed by: INTERNAL MEDICINE

## 2023-12-02 PROCEDURE — 36415 COLL VENOUS BLD VENIPUNCTURE: CPT

## 2023-12-02 PROCEDURE — 6360000002 HC RX W HCPCS: Performed by: STUDENT IN AN ORGANIZED HEALTH CARE EDUCATION/TRAINING PROGRAM

## 2023-12-02 PROCEDURE — 90935 HEMODIALYSIS ONE EVALUATION: CPT

## 2023-12-02 PROCEDURE — 80061 LIPID PANEL: CPT

## 2023-12-02 PROCEDURE — 93306 TTE W/DOPPLER COMPLETE: CPT | Performed by: INTERNAL MEDICINE

## 2023-12-02 PROCEDURE — 85520 HEPARIN ASSAY: CPT

## 2023-12-02 PROCEDURE — 99222 1ST HOSP IP/OBS MODERATE 55: CPT | Performed by: INTERNAL MEDICINE

## 2023-12-02 PROCEDURE — 2700000000 HC OXYGEN THERAPY PER DAY

## 2023-12-02 PROCEDURE — 2060000000 HC ICU INTERMEDIATE R&B

## 2023-12-02 PROCEDURE — 94761 N-INVAS EAR/PLS OXIMETRY MLT: CPT

## 2023-12-02 RX ORDER — MIDODRINE HYDROCHLORIDE 5 MG/1
10 TABLET ORAL ONCE
Status: COMPLETED | OUTPATIENT
Start: 2023-12-02 | End: 2023-12-02

## 2023-12-02 RX ORDER — MIDODRINE HYDROCHLORIDE 5 MG/1
5 TABLET ORAL
Status: DISCONTINUED | OUTPATIENT
Start: 2023-12-02 | End: 2023-12-03

## 2023-12-02 RX ADMIN — Medication 6 MG: at 22:53

## 2023-12-02 RX ADMIN — SODIUM CHLORIDE, PRESERVATIVE FREE 10 ML: 5 INJECTION INTRAVENOUS at 21:07

## 2023-12-02 RX ADMIN — AMLODIPINE BESYLATE 5 MG: 5 TABLET ORAL at 10:24

## 2023-12-02 RX ADMIN — HEPARIN SODIUM 17 UNITS/KG/HR: 10000 INJECTION, SOLUTION INTRAVENOUS at 04:58

## 2023-12-02 RX ADMIN — SODIUM CHLORIDE, PRESERVATIVE FREE 10 ML: 5 INJECTION INTRAVENOUS at 21:06

## 2023-12-02 RX ADMIN — ATORVASTATIN CALCIUM 80 MG: 80 TABLET, FILM COATED ORAL at 10:24

## 2023-12-02 RX ADMIN — INSULIN LISPRO 1 UNITS: 100 INJECTION, SOLUTION INTRAVENOUS; SUBCUTANEOUS at 10:26

## 2023-12-02 RX ADMIN — Medication 1 TABLET: at 10:24

## 2023-12-02 RX ADMIN — SEVELAMER CARBONATE 800 MG: 800 TABLET, FILM COATED ORAL at 10:24

## 2023-12-02 RX ADMIN — FERROUS SULFATE TAB EC 325 MG (65 MG FE EQUIVALENT) 325 MG: 325 (65 FE) TABLET DELAYED RESPONSE at 10:24

## 2023-12-02 RX ADMIN — SEVELAMER CARBONATE 800 MG: 800 TABLET, FILM COATED ORAL at 13:35

## 2023-12-02 RX ADMIN — HYDRALAZINE HYDROCHLORIDE 25 MG: 25 TABLET, FILM COATED ORAL at 13:35

## 2023-12-02 RX ADMIN — LANTHANUM CARBONATE 1000 MG: 500 TABLET, CHEWABLE ORAL at 11:40

## 2023-12-02 RX ADMIN — ASPIRIN 81 MG: 81 TABLET, CHEWABLE ORAL at 10:24

## 2023-12-02 RX ADMIN — Medication 2000 UNITS: at 10:24

## 2023-12-02 RX ADMIN — HYDROCODONE BITARTRATE AND ACETAMINOPHEN 1 TABLET: 5; 325 TABLET ORAL at 21:48

## 2023-12-02 RX ADMIN — CHLORPROMAZINE HYDROCHLORIDE 25 MG: 25 TABLET, FILM COATED ORAL at 13:35

## 2023-12-02 RX ADMIN — OLANZAPINE 5 MG: 5 TABLET, FILM COATED ORAL at 21:32

## 2023-12-02 RX ADMIN — ACETAMINOPHEN 650 MG: 325 TABLET ORAL at 21:48

## 2023-12-02 RX ADMIN — ISOSORBIDE MONONITRATE 30 MG: 30 TABLET, EXTENDED RELEASE ORAL at 10:24

## 2023-12-02 RX ADMIN — LANTHANUM CARBONATE 1000 MG: 500 TABLET, CHEWABLE ORAL at 13:35

## 2023-12-02 RX ADMIN — INSULIN HUMAN 10 UNITS: 100 INJECTION, SOLUTION PARENTERAL at 10:07

## 2023-12-02 RX ADMIN — DEXTROSE MONOHYDRATE 250 ML: 100 INJECTION, SOLUTION INTRAVENOUS at 10:07

## 2023-12-02 RX ADMIN — HYDRALAZINE HYDROCHLORIDE 25 MG: 25 TABLET, FILM COATED ORAL at 10:24

## 2023-12-02 RX ADMIN — CHLORPROMAZINE HYDROCHLORIDE 25 MG: 25 TABLET, FILM COATED ORAL at 10:24

## 2023-12-02 RX ADMIN — METOPROLOL TARTRATE 25 MG: 25 TABLET ORAL at 21:31

## 2023-12-02 RX ADMIN — PANTOPRAZOLE SODIUM 40 MG: 40 TABLET, DELAYED RELEASE ORAL at 10:24

## 2023-12-02 RX ADMIN — HEPARIN SODIUM 2000 UNITS: 1000 INJECTION INTRAVENOUS; SUBCUTANEOUS at 21:42

## 2023-12-02 RX ADMIN — CHLORPROMAZINE HYDROCHLORIDE 25 MG: 25 TABLET, FILM COATED ORAL at 21:32

## 2023-12-02 RX ADMIN — HEPARIN SODIUM 2000 UNITS: 1000 INJECTION INTRAVENOUS; SUBCUTANEOUS at 04:57

## 2023-12-02 RX ADMIN — SODIUM ZIRCONIUM CYCLOSILICATE 10 G: 10 POWDER, FOR SUSPENSION ORAL at 10:24

## 2023-12-02 RX ADMIN — FUROSEMIDE 40 MG: 10 INJECTION, SOLUTION INTRAMUSCULAR; INTRAVENOUS at 11:42

## 2023-12-02 RX ADMIN — HEPARIN SODIUM 17 UNITS/KG/HR: 10000 INJECTION, SOLUTION INTRAVENOUS at 10:09

## 2023-12-02 RX ADMIN — MIDODRINE HYDROCHLORIDE 10 MG: 5 TABLET ORAL at 01:54

## 2023-12-02 ASSESSMENT — PAIN DESCRIPTION - DESCRIPTORS: DESCRIPTORS: ACHING

## 2023-12-02 ASSESSMENT — PAIN SCALES - GENERAL: PAINLEVEL_OUTOF10: 8

## 2023-12-02 ASSESSMENT — PAIN DESCRIPTION - LOCATION: LOCATION: BACK;FOOT

## 2023-12-02 ASSESSMENT — PAIN DESCRIPTION - ORIENTATION: ORIENTATION: RIGHT;LEFT

## 2023-12-02 NOTE — H&P
[address]     Department of Internal Medicine - Staff Internal Medicine Teaching Service          ADMISSION NOTE/HISTORY AND PHYSICAL EXAMINATION   Date: 12/2/2023  Patient Name: Renetta Menchaca  Date of admission: 12/1/2023 11:42 AM  YOB: 1975  PCP: Tre Small MD  History Obtained From:  patient    CHIEF COMPLAINT     Chief complaint: Chest pain and SOB    HISTORY OF PRESENTING ILLNESS     The patient is a pleasant 46 y.o. male with PMHx of HTN, T2DM, ESRD on HD MWF, GERD, Ischemic cardiomyopathy, left thalamic ischemic stroke leading to Right hemiparesis and dysarthria(Sept 2023) presented with SOB and chest pain for last few weeks. 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 Patient came to [facility] with the complaint of SOB and central chest pain for past several weeks. Troponins were elevated(1885) and EKD showed NSTEMI. Patient's family did not want to go to [facility] for cath and hence patient was transferred to [facility]. Cath revealed MVCAD with patent SVG-RCA and occluded LIMA and other SV grafts. CTS was consulted for CABG. CTS ordered repeat imaging vein mapping, carotid ultrasound along with repeat ECHO. Renetta Drake has had previous CABG at [address] [place] [address] are ongoing discussions if he should go back to [facility] for possible CABG since they are more familiar with his case. Today,on evaluation patient was afebrile and hemodynamically stable and saturating well on room air. He reports decrease in the chest pain but SOB still present. He denied fever, chills, N/V, abdominal pain, constipation and diarrhea. On examination,chest was clear. Labs revealed elevated Cr(5.2). Last dialysis was on Wednesday. Nephrology plans to take patient to dialysis tomorrow.     Negative ROS except mentioned in HPI    PAST MEDICAL HISTORY     Past Medical History:   Diagnosis Date    Abscess, gluteal, left
REMOVAL Right 10/18/2019    EXCISION ABSCESS SCROTAL SCROTAL ABSCESS DEBRIDEMENT AND WOUND VAC APPLICATION, RIGHT ORCHIECTOMY;    [address]NELED [address], dialysis catheter         Family History   Problem Relation Age of Onset    Diabetes Mother     High Blood Pressure Mother     Diabetes Father     Cancer Father     High Blood Pressure Father     Diabetes Paternal Grandmother     Diabetes Paternal Grandfather     Cancer Paternal Grandfather        Allergies   Allergen Reactions    Clonidine Other (See Comments)       Prior to Admission medications    Medication Sig Start Date End Date Taking?  Authorizing Provider   apixaban (ELIQUIS) 5 MG TABS tablet Take 1 tablet by mouth 2 times daily    Franca Rose MD   atorvastatin (LIPITOR) 40 MG tablet Take 1 tablet by mouth daily    Franca Rose MD   chlorproMAZINE (THORAZINE) 25 MG tablet Take 1 tablet by mouth 3 times daily    Franca Rose MD   vitamin D (CHOLECALCIFEROL) 25 MCG (1000 UT) TABS tablet Take 2 tablets by mouth daily    Franca Rose MD   ferrous sulfate (IRON 325) 325 (65 Fe) MG tablet Take 1 tablet by mouth daily (with breakfast)    Franca Rose MD   hydrALAZINE (APRESOLINE) 25 MG tablet Take 1 tablet by mouth 3 times daily    Franca Rose MD   isosorbide mononitrate (IMDUR) 30 MG extended release tablet Take 1 tablet by mouth daily    Franca Rose MD   insulin glargine (LANTUS) 100 UNIT/ML injection vial Inject 8 Units into the skin nightly    Franca Rose MD   Lanthanum Carbonate 1000 MG PACK Take 1,000 mg by mouth 3 times daily With meals    Franca Rose MD   lidocaine-prilocaine (EMLA) 2.5-2.5 % cream Apply topically as needed for Pain Apply topically as needed to fistula    Franca Rose MD   melatonin 3 MG TABS tablet Take 2 tablets by mouth nightly as needed    Franca Rose MD   nicotine (NICODERM CQ) 7 MG/24HR Place 1

## 2023-12-02 NOTE — PLAN OF CARE
Awaiting pre-op studies to be complete.    Awaiting records from [facility]   Work up ongoing

## 2023-12-02 NOTE — CARE COORDINATION
Case Management Assessment  Initial Evaluation    Date/Time of Evaluation: 12/2/2023 5:33 PM  Assessment Completed by: Kayla Robison    If patient is discharged prior to next notation, then this note serves as note for discharge by case management. Patient Name: Arminda Wills                   YOB: 1975  Diagnosis: Angina pectoris ([address]) [I20.9]  NSTEMI (non-ST elevated myocardial infarction) [facility]) [I21.4]                   Date / Time: 12/1/2023 11:42 AM    Patient Admission Status: Inpatient   Readmission Risk (Low < 19, Mod (19-27), High > 27): Readmission Risk Score: 26.6    Current PCP: Avery Mcneil MD  PCP verified by CM? Yes (Dr Stoddard Gave)    Chart Reviewed: Yes      History Provided by: Patient  Patient Orientation: Alert and Oriented, Person, Place, Situation, Self    Patient Cognition: Short Term Memory Deficit    Hospitalization in the last 30 days (Readmission):  No    If yes, Readmission Assessment in CM Navigator will be completed. Advance Directives:      Code Status: Full Code   Patient's Primary Decision Maker is: Legal Next of Kin      Discharge Planning:    Patient lives with: Spouse/Significant Other Type of Home: Apartment  Primary Care Giver: Self  Patient Support Systems include: Spouse/Significant Other   Current Financial resources: Medicaid, Medicare  Current community resources:    Current services prior to admission: Durable Medical Equipment            Current DME: Evie Members            Type of Home Care services:  None    ADLS  Prior functional level: Independent in ADLs/IADLs  Current functional level: Assistance with the following:, Bathing, Dressing, Toileting, Cooking, Housework, Shopping, Mobility (is very weak)    PT AM-PAC:   /24  OT AM-PAC:   /24    Family can provide assistance at DC: Yes  Would you like Case Management to discuss the discharge plan with any other family members/significant others, and if so, who?  No  Plans to Return to Present

## 2023-12-02 NOTE — CARE COORDINATION
Case Management Assessment  Initial Evaluation    Date/Time of Evaluation: 12/2/2023 1:36 PM  Assessment Completed by: Mary Carmen Hill    If patient is discharged prior to next notation, then this note serves as note for discharge by case management. Patient Name: Matt Rene                   YOB: 1975  Diagnosis: Angina pectoris ([address]) [I20.9]  NSTEMI (non-ST elevated myocardial infarction) [facility]) [I21.4]                   Date / Time: 12/1/2023 11:42 AM    Patient Admission Status: Inpatient   Readmission Risk (Low < 19, Mod (19-27), High > 27): Readmission Risk Score: 26.7    Current PCP: May Fraga MD  PCP verified by CM? Yes (Dr Jess De Jesus)    Chart Reviewed: Yes      History Provided by: Patient  Patient Orientation: Alert and Oriented, Person, Place, Situation, Self    Patient Cognition: Short Term Memory Deficit    Hospitalization in the last 30 days (Readmission):  Yes    If yes, Readmission Assessment in CM Navigator will be completed. Advance Directives:      Code Status: Full Code   Patient's Primary Decision Maker is: Legal Next of Kin      Discharge Planning:    Patient lives with: Spouse/Significant Other Type of Home: Apartment  Primary Care Giver: Self  Patient Support Systems include: Spouse/Significant Other   Current Financial resources: Medicaid, Medicare  Current community resources:    Current services prior to admission: Durable Medical Equipment            Current DME: Rushie Hutching            Type of Home Care services:  None    ADLS  Prior functional level: Independent in ADLs/IADLs  Current functional level: Assistance with the following:, Bathing, Dressing, Toileting, Cooking, Housework, Shopping, Mobility (is very weak)    PT AM-PAC:   /24  OT AM-PAC:   /24    Family can provide assistance at DC: Yes  Would you like Case Management to discuss the discharge plan with any other family members/significant others, and if so, who?  No  Plans to Return to Present

## 2023-12-02 NOTE — PLAN OF CARE

## 2023-12-02 NOTE — CONSULTS
Renal Consult Note    Patient :  Justin Rodriguez; 46 y.o. MRN# [id number]  Location:  [facility]/0673-63  Attending:  Mercy Aguilera MD  Admit Date:  12/1/2023   Hospital Day: 1    Reason for Consult:     Asked by Dr Mercy Aguilera MD to see ESRD on HD     History Obtained From:     Electronic medical record    Patient is very fatigued and lethargic to provide his own history. History of Present Illness:     Justin Rodriguez; 46 y.o. male with past medical history   -ESRD, on hemodialysis as per chart Monday Wednesday Friday. - Hypertension, unknown duration.  - Type 2 diabetes mellitus unknown duration.  - Coronary artery disease, status post CABG.  - GERD    Initially presented to [facility] with chest pain. As per chart review his chest pain was intermittent in nature, was going on for last 7 days. He was also found to have elevated troponins. On chart record, there are also noted mixed sessions of dialysis. Patient was transferred to [address] [place] [address] for his heart catheterization and he was found to have Multivessel coronary artery disease. CTS has been consulted for his further evaluation and since patient recently underwent CABG in July 2023, he has been recommended to transfer to [facility] for further workup and evaluation. Nephrology was consulted in [facility] who recommended to continue with the hemodialysis session scheduled as Monday Wednesday and Friday. His lab work up has shown Na 132, K 5.7, Cl 92, Glucose 119, BUN 57, Creatinine 5.6, Calcium 11.7. WBC UA, Hb 9.1 with .7  Past History/Allergies? Social History:     Past Medical History:   Diagnosis Date    Abscess, gluteal, left     CVA (cerebral vascular accident) ([address])     Diabetes mellitus ([address])     Dr. Jason Paz    End stage renal disease [facility])     All's gangrene of scrotum     including penis    GERD (gastroesophageal reflux disease)     Hemodialysis patient [facility])     Lesa Stafford, [phone], T-TH-SAT     Hyperlipidemia
patch onto the skin every 24 hours    Franca Rose MD   HYDROcodone-acetaminophen (NORCO) 5-325 MG per tablet Take 1 tablet by mouth every 6 hours as needed for Pain. Max Daily Amount: 4 tablets    Franca Rose MD   OLANZapine (ZYPREXA) 5 MG tablet Take 1 tablet by mouth nightly    Franca Rose MD   sevelamer (RENVELA) 800 MG tablet Take 1 tablet by mouth 3 times daily (with meals)    Franca Rose MD   B Complex CAPS TAKE 1 CAPSULE BY MOUTH EVERY DAY 4/21/20   Franca Rose MD   insulin aspart (NOVOLOG FLEXPEN) 100 UNIT/ML injection pen Inject into the skin 3 times daily (before meals) 140-180=4 units, 181-240=6 units, 241-300=8 units, 301-350=10 units, 351-400=12 units    Franca Rose MD   pantoprazole (PROTONIX) 40 MG tablet pantoprazole 40 mg tablet,delayed release    Franca Rose MD   amLODIPine (NORVASC) 10 MG tablet Take 1 tablet by mouth daily Further refills by PCP  Patient taking differently: Take 0.5 tablets by mouth daily Further refills by PCP 5/29/17   Roxy Serna MD         Vitals:    12/01/23 1515   BP:    Pulse:    Resp:    Temp:    SpO2: 96%       Constitutional and General Appearance:   alert, cooperative, no distress and appears stated age  HEENT:  PERRL, EOMI  Respiratory:  Normal excursion and expansion without use of accessory muscles  Resp Auscultation:  Good respiratory effort. No for increased work of breathing. On auscultation: clear to auscultation bilaterally  Cardiovascular:  Regular rate and rhythm. S1/S2  No murmurs. The apical impulse is not displaced  Abdomen:  Soft  Bowel sounds present  Non-tender to palpation  Extremities:  No cyanosis or clubbing  Lower extremity edema: No.  Skin:  Warm and dry  Neurological:  Alert and oriented.   Moves all extremities well      Data:  Cath  Cath on 7/23 revealed left main disease, severe left circumflex disease, severe RCA disease with  and collaterals     CABG x3 on 7/23
dextrose 5% 250 mL (premix) infusion  5-30 Units/kg/hr IntraVENous Continuous Colby Avilez MD        glucose chewable tablet 16 g  4 tablet Oral PRN Colby Avilez MD        dextrose bolus 10% 125 mL  125 mL IntraVENous PRN Cobly Avilez MD        Or    dextrose bolus 10% 250 mL  250 mL IntraVENous PRN Colby Avilez MD        glucagon (rDNA) injection 1 mg  1 mg SubCUTAneous PRN Colby Avilez MD        dextrose 10 % infusion   IntraVENous Continuous PRN Colby Avilez MD           Physical Exam:  Vitals:    12/01/23 1515   BP:    Pulse:    Resp:    Temp:    SpO2: 96%     Weight: Weight - Scale: 72.8 kg (160 lb 7.9 oz)    Weight - Scale: 72.8 kg (160 lb 7.9 oz)        General: Alert and Oriented x3. Sitting up in bed. No apparent distress. HEENT:  Normocephalic and atraumatic. PERRL. EOMI. Lips and oral mucosa moist and without lesions. Neck:  Supple. Trachea midline. Chest:  No abnormality. Equal and symmetric expansion with respiration. Lungs:  Clear to auscultation. Cardiac:  Regular rate and rhythm without murmurs, rubs or gallops. Abdomen:  Soft, non-tender, normoactive bowel sounds. No masses or organomegaly. Extremities:  No cyanosis, clubbing, or edema. Intact pulses in all four extremities. Musculoskeletal:  Intact range of motion of peripheral joints. Normal muscular strength. Neurologic:  Cranial nerves are grossly intact. Non-focal sensory deficits on exam.  Psychiatric: Mood and affect are appropriate. Imaging Studies:    Cardiac Cath:  Left Main   Has distal 90% stenosis with heavy calcification. Left Anterior Descending   Has proximal 50% stenosis with mid focal 90% stenosis, distal 40% stenosis      Ramus Intermedius   Has ostial 90% stenosis. Left Circumflex   Has ostial 90% stenosis. Right Coronary Artery   Has 100% occlusion with SVG-distal RCA patent. Echo:pending    Known ejection fraction of 5 to 10% which was done at [facility] November 2.   Await repeat

## 2023-12-03 LAB
ALBUMIN SERPL-MCNC: 3.4 G/DL (ref 3.5–5.2)
ALBUMIN/GLOB SERPL: 0.9 {RATIO} (ref 1–2.5)
ALP SERPL-CCNC: 355 U/L (ref 40–129)
ALT SERPL-CCNC: 8 U/L (ref 5–41)
ANION GAP SERPL CALCULATED.3IONS-SCNC: 14 MMOL/L (ref 9–17)
ANTI-XA UNFRAC HEPARIN: 0.25 IU/L
ANTI-XA UNFRAC HEPARIN: 0.36 IU/L
ANTI-XA UNFRAC HEPARIN: 0.38 IU/L
AST SERPL-CCNC: 15 U/L
BILIRUB SERPL-MCNC: 0.3 MG/DL (ref 0.3–1.2)
BUN SERPL-MCNC: 32 MG/DL (ref 6–20)
CALCIUM SERPL-MCNC: 10.5 MG/DL (ref 8.6–10.4)
CHLORIDE SERPL-SCNC: 94 MMOL/L (ref 98–107)
CO2 SERPL-SCNC: 26 MMOL/L (ref 20–31)
CREAT SERPL-MCNC: 4 MG/DL (ref 0.7–1.2)
ERYTHROCYTE [DISTWIDTH] IN BLOOD BY AUTOMATED COUNT: 17.3 % (ref 11.8–14.4)
EST. AVERAGE GLUCOSE BLD GHB EST-MCNC: 114 MG/DL
GFR SERPL CREATININE-BSD FRML MDRD: 17 ML/MIN/1.73M2
GLUCOSE BLD-MCNC: 122 MG/DL (ref 75–110)
GLUCOSE BLD-MCNC: 139 MG/DL (ref 75–110)
GLUCOSE BLD-MCNC: 200 MG/DL (ref 75–110)
GLUCOSE BLD-MCNC: 218 MG/DL (ref 75–110)
GLUCOSE SERPL-MCNC: 159 MG/DL (ref 70–99)
HBA1C MFR BLD: 5.6 % (ref 4–6)
HCT VFR BLD AUTO: 28.3 % (ref 40.7–50.3)
HGB BLD-MCNC: 8.5 G/DL (ref 13–17)
MCH RBC QN AUTO: 32.2 PG (ref 25.2–33.5)
MCHC RBC AUTO-ENTMCNC: 30 G/DL (ref 28.4–34.8)
MCV RBC AUTO: 107.2 FL (ref 82.6–102.9)
NRBC BLD-RTO: 0 PER 100 WBC
PLATELET # BLD AUTO: 193 K/UL (ref 138–453)
PMV BLD AUTO: 10.1 FL (ref 8.1–13.5)
POTASSIUM SERPL-SCNC: 4.3 MMOL/L (ref 3.7–5.3)
PROT SERPL-MCNC: 7.1 G/DL (ref 6.4–8.3)
RBC # BLD AUTO: 2.64 M/UL (ref 4.21–5.77)
SODIUM SERPL-SCNC: 134 MMOL/L (ref 135–144)
WBC OTHER # BLD: 5.5 K/UL (ref 3.5–11.3)

## 2023-12-03 PROCEDURE — 6360000002 HC RX W HCPCS: Performed by: STUDENT IN AN ORGANIZED HEALTH CARE EDUCATION/TRAINING PROGRAM

## 2023-12-03 PROCEDURE — 6370000000 HC RX 637 (ALT 250 FOR IP): Performed by: STUDENT IN AN ORGANIZED HEALTH CARE EDUCATION/TRAINING PROGRAM

## 2023-12-03 PROCEDURE — 85520 HEPARIN ASSAY: CPT

## 2023-12-03 PROCEDURE — 99232 SBSQ HOSP IP/OBS MODERATE 35: CPT | Performed by: INTERNAL MEDICINE

## 2023-12-03 PROCEDURE — 2700000000 HC OXYGEN THERAPY PER DAY

## 2023-12-03 PROCEDURE — 92610 EVALUATE SWALLOWING FUNCTION: CPT

## 2023-12-03 PROCEDURE — 99233 SBSQ HOSP IP/OBS HIGH 50: CPT | Performed by: SURGERY

## 2023-12-03 PROCEDURE — 94761 N-INVAS EAR/PLS OXIMETRY MLT: CPT

## 2023-12-03 PROCEDURE — 2580000003 HC RX 258: Performed by: STUDENT IN AN ORGANIZED HEALTH CARE EDUCATION/TRAINING PROGRAM

## 2023-12-03 PROCEDURE — 85027 COMPLETE CBC AUTOMATED: CPT

## 2023-12-03 PROCEDURE — 82947 ASSAY GLUCOSE BLOOD QUANT: CPT

## 2023-12-03 PROCEDURE — 83036 HEMOGLOBIN GLYCOSYLATED A1C: CPT

## 2023-12-03 PROCEDURE — 36415 COLL VENOUS BLD VENIPUNCTURE: CPT

## 2023-12-03 PROCEDURE — 2060000000 HC ICU INTERMEDIATE R&B

## 2023-12-03 PROCEDURE — 80053 COMPREHEN METABOLIC PANEL: CPT

## 2023-12-03 PROCEDURE — 6370000000 HC RX 637 (ALT 250 FOR IP): Performed by: SURGERY

## 2023-12-03 RX ORDER — MIDODRINE HYDROCHLORIDE 5 MG/1
10 TABLET ORAL PRN
Status: DISCONTINUED | OUTPATIENT
Start: 2023-12-03 | End: 2023-12-05

## 2023-12-03 RX ADMIN — LANTHANUM CARBONATE 1000 MG: 500 TABLET, CHEWABLE ORAL at 12:08

## 2023-12-03 RX ADMIN — SODIUM CHLORIDE, PRESERVATIVE FREE 10 ML: 5 INJECTION INTRAVENOUS at 19:28

## 2023-12-03 RX ADMIN — HYDROCODONE BITARTRATE AND ACETAMINOPHEN 1 TABLET: 5; 325 TABLET ORAL at 21:15

## 2023-12-03 RX ADMIN — SEVELAMER CARBONATE 800 MG: 800 TABLET, FILM COATED ORAL at 12:08

## 2023-12-03 RX ADMIN — HEPARIN SODIUM 19 UNITS/KG/HR: 10000 INJECTION, SOLUTION INTRAVENOUS at 07:25

## 2023-12-03 RX ADMIN — MIDODRINE HYDROCHLORIDE 5 MG: 5 TABLET ORAL at 08:00

## 2023-12-03 RX ADMIN — CHLORPROMAZINE HYDROCHLORIDE 25 MG: 25 TABLET, FILM COATED ORAL at 14:08

## 2023-12-03 RX ADMIN — HYDROCODONE BITARTRATE AND ACETAMINOPHEN 1 TABLET: 5; 325 TABLET ORAL at 08:38

## 2023-12-03 RX ADMIN — Medication 1 TABLET: at 08:34

## 2023-12-03 RX ADMIN — SEVELAMER CARBONATE 800 MG: 800 TABLET, FILM COATED ORAL at 17:54

## 2023-12-03 RX ADMIN — HEPARIN SODIUM 2000 UNITS: 1000 INJECTION INTRAVENOUS; SUBCUTANEOUS at 12:27

## 2023-12-03 RX ADMIN — CHLORPROMAZINE HYDROCHLORIDE 25 MG: 25 TABLET, FILM COATED ORAL at 21:14

## 2023-12-03 RX ADMIN — ASPIRIN 81 MG: 81 TABLET, CHEWABLE ORAL at 08:35

## 2023-12-03 RX ADMIN — FERROUS SULFATE TAB EC 325 MG (65 MG FE EQUIVALENT) 325 MG: 325 (65 FE) TABLET DELAYED RESPONSE at 08:35

## 2023-12-03 RX ADMIN — ATORVASTATIN CALCIUM 80 MG: 80 TABLET, FILM COATED ORAL at 08:35

## 2023-12-03 RX ADMIN — ISOSORBIDE MONONITRATE 30 MG: 30 TABLET, EXTENDED RELEASE ORAL at 08:34

## 2023-12-03 RX ADMIN — ACETAMINOPHEN 650 MG: 325 TABLET ORAL at 21:16

## 2023-12-03 RX ADMIN — HEPARIN SODIUM 21 UNITS/KG/HR: 10000 INJECTION, SOLUTION INTRAVENOUS at 23:55

## 2023-12-03 RX ADMIN — LANTHANUM CARBONATE 1000 MG: 500 TABLET, CHEWABLE ORAL at 08:34

## 2023-12-03 RX ADMIN — CHLORPROMAZINE HYDROCHLORIDE 25 MG: 25 TABLET, FILM COATED ORAL at 08:35

## 2023-12-03 RX ADMIN — Medication 2000 UNITS: at 08:34

## 2023-12-03 RX ADMIN — PANTOPRAZOLE SODIUM 40 MG: 40 TABLET, DELAYED RELEASE ORAL at 08:34

## 2023-12-03 RX ADMIN — METOPROLOL TARTRATE 25 MG: 25 TABLET ORAL at 21:14

## 2023-12-03 RX ADMIN — METOPROLOL TARTRATE 25 MG: 25 TABLET ORAL at 08:34

## 2023-12-03 RX ADMIN — SEVELAMER CARBONATE 800 MG: 800 TABLET, FILM COATED ORAL at 08:34

## 2023-12-03 RX ADMIN — OLANZAPINE 5 MG: 5 TABLET, FILM COATED ORAL at 21:14

## 2023-12-03 ASSESSMENT — PAIN DESCRIPTION - ORIENTATION
ORIENTATION: RIGHT;LEFT
ORIENTATION: LOWER

## 2023-12-03 ASSESSMENT — PAIN DESCRIPTION - LOCATION
LOCATION: BACK;BUTTOCKS;FOOT
LOCATION: BACK

## 2023-12-03 ASSESSMENT — PAIN SCALES - GENERAL
PAINLEVEL_OUTOF10: 4
PAINLEVEL_OUTOF10: 8
PAINLEVEL_OUTOF10: 6

## 2023-12-03 ASSESSMENT — PAIN DESCRIPTION - DESCRIPTORS: DESCRIPTORS: ACHING

## 2023-12-03 NOTE — PLAN OF CARE
Problem: Chronic Conditions and Co-morbidities  Goal: Patient's chronic conditions and co-morbidity symptoms are monitored and maintained or improved  Outcome: Progressing     Problem: Safety - Adult  Goal: Free from fall injury  Outcome: Progressing  Flowsheets (Taken 12/2/2023 2045)  Free From Fall Injury: Instruct family/caregiver on patient safety     Problem: Discharge Planning  Goal: Discharge to home or other facility with appropriate resources  Outcome: Progressing     Problem: Skin/Tissue Integrity  Goal: Absence of new skin breakdown  Description: 1. Monitor for areas of redness and/or skin breakdown  2. Assess vascular access sites hourly  3. Every 4-6 hours minimum:  Change oxygen saturation probe site  4. Every 4-6 hours:  If on nasal continuous positive airway pressure, respiratory therapy assess nares and determine need for appliance change or resting period.   Outcome: Progressing     Problem: ABCDS Injury Assessment  Goal: Absence of physical injury  Outcome: Progressing  Flowsheets (Taken 12/2/2023 2045)  Absence of Physical Injury: Implement safety measures based on patient assessment     Problem: Pain  Goal: Verbalizes/displays adequate comfort level or baseline comfort level  Outcome: Progressing  Flowsheets (Taken 12/2/2023 2045)  Verbalizes/displays adequate comfort level or baseline comfort level: Encourage patient to monitor pain and request assistance     Problem: Nutrition Deficit:  Goal: Optimize nutritional status  Outcome: Progressing  Flowsheets (Taken 12/2/2023 1308 by Verma Hamman, RD)  Nutrient intake appropriate for improving, restoring, or maintaining nutritional needs:   Assess nutritional status and recommend course of action   Monitor oral intake, labs, and treatment plans   Recommend appropriate diets, oral nutritional supplements, and vitamin/mineral supplements

## 2023-12-03 NOTE — CARE COORDINATION
Case Management Assessment  Initial Evaluation    Date/Time of Evaluation: 12/3/2023 4:32 PM  Assessment Completed by: Kvng Diez    If patient is discharged prior to next notation, then this note serves as note for discharge by case management. Patient Name: Geena Williamson                   YOB: 1975  Diagnosis: Angina pectoris ([address]) [I20.9]  NSTEMI (non-ST elevated myocardial infarction) [facility]) [I21.4]                   Date / Time: 12/1/2023 11:42 AM    Patient Admission Status: Inpatient   Readmission Risk (Low < 19, Mod (19-27), High > 27): Readmission Risk Score: 26.5    Current PCP: Carol Ingram MD  PCP verified by CM? (P) Yes (Dr. Wally Gordillo)    Chart Reviewed: Yes      History Provided by: (P) Spouse, Patient  Patient Orientation: (P) Alert and Oriented    Patient Cognition: (P) Alert    Hospitalization in the last 30 days (Readmission):  Yes    If yes, Readmission Assessment in  Navigator will be completed.     Advance Directives:      Code Status: Full Code   Patient's Primary Decision Maker is: (P) Legal Next of Kin      Discharge Planning:    Patient lives with: (P) Spouse/Significant Other Type of Home: (P) [address] [place] [address], Acute Rehab  Primary Care Giver: (P) Spouse  Patient Support Systems include: (P) Spouse/Significant Other   Current Financial resources: (P) Medicaid, Medicare  Current community resources:    Current services prior to admission: (P) Durable Medical Equipment            Current DME: (P) Walker, Cane            Type of Home Care services:  (P) None    ADLS  Prior functional level: (P) Assistance with the following:, Bathing, Dressing, Toileting, Cooking, Housework, Shopping, Mobility  Current functional level: (P) Assistance with the following:, Bathing, Dressing, Toileting, Cooking, Housework, Shopping, Mobility    PT AM-PAC:   /24  OT AM-PAC:   /24    Family can provide assistance at DC: (P) Yes  Would you like Case Management to discuss the discharge plan

## 2023-12-03 NOTE — PLAN OF CARE
Problem: Chronic Conditions and Co-morbidities  Goal: Patient's chronic conditions and co-morbidity symptoms are monitored and maintained or improved  Outcome: Progressing     Problem: Safety - Adult  Goal: Free from fall injury  Outcome: Progressing  Flowsheets  Taken 12/2/2023 2045  Free From Fall Injury: Instruct family/caregiver on patient safety  Taken 12/2/2023 2000  Free From Fall Injury: Instruct family/caregiver on patient safety     Problem: Discharge Planning  Goal: Discharge to home or other facility with appropriate resources  Outcome: Progressing     Problem: Skin/Tissue Integrity  Goal: Absence of new skin breakdown  Description: 1. Monitor for areas of redness and/or skin breakdown  2. Assess vascular access sites hourly  3. Every 4-6 hours minimum:  Change oxygen saturation probe site  4. Every 4-6 hours:  If on nasal continuous positive airway pressure, respiratory therapy assess nares and determine need for appliance change or resting period.   Outcome: Progressing     Problem: ABCDS Injury Assessment  Goal: Absence of physical injury  Outcome: Progressing  Flowsheets  Taken 12/2/2023 2045  Absence of Physical Injury: Implement safety measures based on patient assessment  Taken 12/2/2023 2000  Absence of Physical Injury: Implement safety measures based on patient assessment     Problem: Pain  Goal: Verbalizes/displays adequate comfort level or baseline comfort level  Outcome: Progressing  Flowsheets (Taken 12/2/2023 2045)  Verbalizes/displays adequate comfort level or baseline comfort level: Encourage patient to monitor pain and request assistance     Problem: Nutrition Deficit:  Goal: Optimize nutritional status  Outcome: Progressing  Flowsheets (Taken 12/2/2023 1308 by Cassi Vance RD)  Nutrient intake appropriate for improving, restoring, or maintaining nutritional needs:   Assess nutritional status and recommend course of action   Monitor oral intake, labs, and treatment plans

## 2023-12-03 NOTE — CARE COORDINATION
12/03/23 8200   Readmission Assessment   Number of Days since last admission? 1-7 days   Previous Disposition Other (comment)  (Direct admit from [facility])   Who is being Interviewed Patient;Caregiver  (wife; Direct admit from [facility])   What was the patient's/caregiver's perception as to why they think they needed to return back to the hospital? Other (Comment)  (Direct admit from [facility])   Did you visit your Primary Care Physician after you left the hospital, before you returned this time? No   Why weren't you able to visit your PCP? Other (Comment)  (Direct admit from [facility])   Did you see a specialist, such as Cardiac, Pulmonary, Orthopedic Physician, etc. after you left the hospital? Other (Comment)  (Direct admit from [facility])   Who advised the patient to return to the hospital? Physician  (Direct admit from [facility])   Does the patient report anything that got in the way of taking their medications? No   In our efforts to provide the best possible care to you and others like you, can you think of anything that we could have done to help you after you left the hospital the first time, so that you might not have needed to return so soon?  Other (Comment)  (Direct admit from [facility])

## 2023-12-03 NOTE — PLAN OF CARE
Problem: Chronic Conditions and Co-morbidities  Goal: Patient's chronic conditions and co-morbidity symptoms are monitored and maintained or improved  Outcome: Progressing  Flowsheets (Taken 12/3/2023 1422)  Care Plan - Patient's Chronic Conditions and Co-Morbidity Symptoms are Monitored and Maintained or Improved:   Monitor and assess patient's chronic conditions and comorbid symptoms for stability, deterioration, or improvement   Collaborate with multidisciplinary team to address chronic and comorbid conditions and prevent exacerbation or deterioration   Update acute care plan with appropriate goals if chronic or comorbid symptoms are exacerbated and prevent overall improvement and discharge     Problem: Safety - Adult  Goal: Free from fall injury  Outcome: Progressing  Flowsheets (Taken 12/3/2023 1422)  Free From Fall Injury:   Instruct family/caregiver on patient safety   Based on caregiver fall risk screen, instruct family/caregiver to ask for assistance with transferring infant if caregiver noted to have fall risk factors     Problem: Discharge Planning  Goal: Discharge to home or other facility with appropriate resources  Outcome: Progressing  Flowsheets (Taken 12/3/2023 1422)  Discharge to home or other facility with appropriate resources:   Identify barriers to discharge with patient and caregiver   Arrange for needed discharge resources and transportation as appropriate   Identify discharge learning needs (meds, wound care, etc)   Refer to discharge planning if patient needs post-hospital services based on physician order or complex needs related to functional status, cognitive ability or social support system     Problem: ABCDS Injury Assessment  Goal: Absence of physical injury  Outcome: Progressing  Flowsheets (Taken 12/3/2023 1422)  Absence of Physical Injury: Implement safety measures based on patient assessment

## 2023-12-04 ENCOUNTER — APPOINTMENT (OUTPATIENT)
Dept: DIALYSIS | Age: 48
DRG: 280 | End: 2023-12-04
Attending: INTERNAL MEDICINE
Payer: MEDICARE

## 2023-12-04 ENCOUNTER — APPOINTMENT (OUTPATIENT)
Dept: GENERAL RADIOLOGY | Age: 48
DRG: 280 | End: 2023-12-04
Attending: INTERNAL MEDICINE
Payer: MEDICARE

## 2023-12-04 LAB
ALBUMIN SERPL-MCNC: 3.3 G/DL (ref 3.5–5.2)
ALBUMIN/GLOB SERPL: 0.9 {RATIO} (ref 1–2.5)
ALP SERPL-CCNC: 348 U/L (ref 40–129)
ALT SERPL-CCNC: 8 U/L (ref 5–41)
ANION GAP SERPL CALCULATED.3IONS-SCNC: 14 MMOL/L (ref 9–17)
ANTI-XA UNFRAC HEPARIN: 0.26 IU/L
ANTI-XA UNFRAC HEPARIN: 0.39 IU/L
ANTI-XA UNFRAC HEPARIN: 0.43 IU/L
AST SERPL-CCNC: 15 U/L
BILIRUB SERPL-MCNC: 0.3 MG/DL (ref 0.3–1.2)
BUN SERPL-MCNC: 43 MG/DL (ref 6–20)
CALCIUM SERPL-MCNC: 10.7 MG/DL (ref 8.6–10.4)
CHLORIDE SERPL-SCNC: 93 MMOL/L (ref 98–107)
CO2 SERPL-SCNC: 26 MMOL/L (ref 20–31)
CREAT SERPL-MCNC: 4.9 MG/DL (ref 0.7–1.2)
ECHO BSA: 1.85 M2
ERYTHROCYTE [DISTWIDTH] IN BLOOD BY AUTOMATED COUNT: 17.6 % (ref 11.8–14.4)
GFR SERPL CREATININE-BSD FRML MDRD: 14 ML/MIN/1.73M2
GLUCOSE BLD-MCNC: 116 MG/DL (ref 75–110)
GLUCOSE BLD-MCNC: 147 MG/DL (ref 75–110)
GLUCOSE BLD-MCNC: 152 MG/DL (ref 75–110)
GLUCOSE BLD-MCNC: 203 MG/DL (ref 75–110)
GLUCOSE SERPL-MCNC: 186 MG/DL (ref 70–99)
HCT VFR BLD AUTO: 28.5 % (ref 40.7–50.3)
HCT VFR BLD AUTO: 29.8 % (ref 40.7–50.3)
HGB BLD-MCNC: 8.7 G/DL (ref 13–17)
HGB BLD-MCNC: 9.1 G/DL (ref 13–17)
MCH RBC QN AUTO: 32.7 PG (ref 25.2–33.5)
MCHC RBC AUTO-ENTMCNC: 30.5 G/DL (ref 28.4–34.8)
MCV RBC AUTO: 107.1 FL (ref 82.6–102.9)
NRBC BLD-RTO: 0 PER 100 WBC
PLATELET # BLD AUTO: 194 K/UL (ref 138–453)
PMV BLD AUTO: 10.2 FL (ref 8.1–13.5)
POTASSIUM SERPL-SCNC: 4.8 MMOL/L (ref 3.7–5.3)
PROT SERPL-MCNC: 7 G/DL (ref 6.4–8.3)
RBC # BLD AUTO: 2.66 M/UL (ref 4.21–5.77)
SODIUM SERPL-SCNC: 133 MMOL/L (ref 135–144)
VAS LEFT BRACHIAL A DIST PSV: 226 CM/S
VAS LEFT BULB EDV: 11.8 CM/S
VAS LEFT BULB PSV: 36.9 CM/S
VAS LEFT CCA DIST EDV: 10.3 CM/S
VAS LEFT CCA DIST PSV: 51.1 CM/S
VAS LEFT CCA MID EDV: 13.8 CM/S
VAS LEFT CCA MID PSV: 74.2 CM/S
VAS LEFT CCA PROX EDV: 12.8 CM/S
VAS LEFT CCA PROX PSV: 80.6 CM/S
VAS LEFT ECA PSV: 37 CM/S
VAS LEFT GSV ANKLE DIAM: 2.7 MM
VAS LEFT GSV AT KNEE DIAM: 2.7 MM
VAS LEFT GSV BK DIST DIAM: 1.9 MM
VAS LEFT GSV BK PROX DIAM: 2.6 MM
VAS LEFT GSV JUNC DIAM: 4.6 MM
VAS LEFT GSV THIGH MID DIAM: 2.4 MM
VAS LEFT ICA DIST EDV: 24.6 CM/S
VAS LEFT ICA DIST PSV: 92.4 CM/S
VAS LEFT ICA MID EDV: 23.6 CM/S
VAS LEFT ICA MID PSV: 81.6 CM/S
VAS LEFT ICA PROX EDV: 11.3 CM/S
VAS LEFT ICA PROX PSV: 31.5 CM/S
VAS LEFT RADIAL A DIST PSV: 26.6 CM/S
VAS LEFT RADIAL A MID PSV: 31.1 CM/S
VAS LEFT RADIAL A PROX PSV: 22.6 CM/S
VAS LEFT RADIAL DIST AP DIAM: 0.27 CM
VAS LEFT RADIAL DIST TR DIAM: 0.28 CM
VAS LEFT RADIAL MID AP DIAM: 0.31 CM
VAS LEFT RADIAL MID TR DIAM: 0.32 CM
VAS LEFT RADIAL PROX AP DIAM: 0.31 CM
VAS LEFT RADIAL PROX TR DIAM: 0.38 CM
VAS LEFT ULNAR A DIST PSV: 11.3 CM/S
VAS LEFT ULNAR A MID PSV: 13.1 CM/S
VAS LEFT ULNAR A PROX PSV: 26.4 CM/S
VAS LEFT ULNAR DIST AP DIAM: 0.16 CM
VAS LEFT ULNAR DIST TR DIAM: 0.15 CM
VAS LEFT ULNAR MID AP DIAM: 0.19 CM
VAS LEFT ULNAR MID TR DIAM: 0.18 CM
VAS LEFT ULNAR PROX AP DIAM: 0.29 CM
VAS LEFT ULNAR PROX TR DIAM: 0.3 CM
VAS LEFT VERTEBRAL EDV: 10 CM/S
VAS LEFT VERTEBRAL PSV: 48 CM/S
VAS RIGHT BRACHIAL A DIST PSV: 42.7 CM/S
VAS RIGHT BULB EDV: 8.4 CM/S
VAS RIGHT BULB PSV: 27.9 CM/S
VAS RIGHT CCA DIST EDV: 10.6 CM/S
VAS RIGHT CCA DIST PSV: 36.1 CM/S
VAS RIGHT CCA MID EDV: 9.02 CM/S
VAS RIGHT CCA MID PSV: 47.4 CM/S
VAS RIGHT CCA PROX EDV: 9.4 CM/S
VAS RIGHT CCA PROX PSV: 62.7 CM/S
VAS RIGHT ECA EDV: 0 CM/S
VAS RIGHT ECA PSV: 34.3 CM/S
VAS RIGHT GSV ANKLE DIAM: 2.1 MM
VAS RIGHT GSV AT KNEE DIAM: 1.6 MM
VAS RIGHT GSV BK DIST DIAM: 1.9 MM
VAS RIGHT GSV BK PROX DIAM: 1.5 MM
VAS RIGHT GSV JUNC DIAM: 3.4 MM
VAS RIGHT GSV THIGH MID DIAM: 1.9 MM
VAS RIGHT ICA DIST EDV: 25.5 CM/S
VAS RIGHT ICA DIST PSV: 79 CM/S
VAS RIGHT ICA MID EDV: 25 CM/S
VAS RIGHT ICA MID PSV: 61.5 CM/S
VAS RIGHT ICA PROX EDV: 10.7 CM/S
VAS RIGHT ICA PROX PSV: 30.1 CM/S
VAS RIGHT RADIAL A DIST PSV: 46.3 CM/S
VAS RIGHT RADIAL A MID PSV: 46.3 CM/S
VAS RIGHT RADIAL A PROX PSV: 45.1 CM/S
VAS RIGHT RADIAL DIST AP DIAM: 0.31 CM
VAS RIGHT RADIAL DIST TR DIAM: 0.38 CM
VAS RIGHT RADIAL MID AP DIAM: 0.4 CM
VAS RIGHT RADIAL MID TR DIAM: 0.41 CM
VAS RIGHT RADIAL PROX AP DIAM: 0.29 CM
VAS RIGHT RADIAL PROX TR DIAM: 0.28 CM
VAS RIGHT ULNAR A DIST PSV: 10.6 CM/S
VAS RIGHT ULNAR A MID PSV: 34.3 CM/S
VAS RIGHT ULNAR A PROX PSV: 54.9 CM/S
VAS RIGHT ULNAR DIST AP DIAM: 0.15 CM
VAS RIGHT ULNAR DIST TR DIAM: 0.16 CM
VAS RIGHT ULNAR MID AP DIAM: 0.2 CM
VAS RIGHT ULNAR MID TR DIAM: 0.21 CM
VAS RIGHT ULNAR PROX AP DIAM: 0.32 CM
VAS RIGHT ULNAR PROX TR DIAM: 0.32 CM
VAS RIGHT VERTEBRAL EDV: 7.04 CM/S
VAS RIGHT VERTEBRAL PSV: 38.8 CM/S
WBC OTHER # BLD: 5.3 K/UL (ref 3.5–11.3)

## 2023-12-04 PROCEDURE — 6370000000 HC RX 637 (ALT 250 FOR IP): Performed by: STUDENT IN AN ORGANIZED HEALTH CARE EDUCATION/TRAINING PROGRAM

## 2023-12-04 PROCEDURE — 99233 SBSQ HOSP IP/OBS HIGH 50: CPT | Performed by: NURSE PRACTITIONER

## 2023-12-04 PROCEDURE — 2580000003 HC RX 258: Performed by: STUDENT IN AN ORGANIZED HEALTH CARE EDUCATION/TRAINING PROGRAM

## 2023-12-04 PROCEDURE — 90935 HEMODIALYSIS ONE EVALUATION: CPT | Performed by: INTERNAL MEDICINE

## 2023-12-04 PROCEDURE — 85014 HEMATOCRIT: CPT

## 2023-12-04 PROCEDURE — 36415 COLL VENOUS BLD VENIPUNCTURE: CPT

## 2023-12-04 PROCEDURE — 85027 COMPLETE CBC AUTOMATED: CPT

## 2023-12-04 PROCEDURE — 85018 HEMOGLOBIN: CPT

## 2023-12-04 PROCEDURE — 90935 HEMODIALYSIS ONE EVALUATION: CPT

## 2023-12-04 PROCEDURE — 99232 SBSQ HOSP IP/OBS MODERATE 35: CPT | Performed by: NURSE PRACTITIONER

## 2023-12-04 PROCEDURE — 99232 SBSQ HOSP IP/OBS MODERATE 35: CPT | Performed by: INTERNAL MEDICINE

## 2023-12-04 PROCEDURE — 6370000000 HC RX 637 (ALT 250 FOR IP)

## 2023-12-04 PROCEDURE — 85520 HEPARIN ASSAY: CPT

## 2023-12-04 PROCEDURE — 82947 ASSAY GLUCOSE BLOOD QUANT: CPT

## 2023-12-04 PROCEDURE — 80053 COMPREHEN METABOLIC PANEL: CPT

## 2023-12-04 PROCEDURE — 93880 EXTRACRANIAL BILAT STUDY: CPT | Performed by: STUDENT IN AN ORGANIZED HEALTH CARE EDUCATION/TRAINING PROGRAM

## 2023-12-04 PROCEDURE — 93971 EXTREMITY STUDY: CPT | Performed by: STUDENT IN AN ORGANIZED HEALTH CARE EDUCATION/TRAINING PROGRAM

## 2023-12-04 PROCEDURE — 2060000000 HC ICU INTERMEDIATE R&B

## 2023-12-04 PROCEDURE — 92611 MOTION FLUOROSCOPY/SWALLOW: CPT

## 2023-12-04 PROCEDURE — 6360000002 HC RX W HCPCS: Performed by: STUDENT IN AN ORGANIZED HEALTH CARE EDUCATION/TRAINING PROGRAM

## 2023-12-04 PROCEDURE — 74230 X-RAY XM SWLNG FUNCJ C+: CPT

## 2023-12-04 PROCEDURE — 93930 UPPER EXTREMITY STUDY: CPT | Performed by: STUDENT IN AN ORGANIZED HEALTH CARE EDUCATION/TRAINING PROGRAM

## 2023-12-04 RX ORDER — CLOPIDOGREL BISULFATE 75 MG/1
75 TABLET ORAL DAILY
COMMUNITY

## 2023-12-04 RX ORDER — OMEPRAZOLE 20 MG/1
20 CAPSULE, DELAYED RELEASE ORAL 2 TIMES DAILY
COMMUNITY

## 2023-12-04 RX ORDER — METOPROLOL SUCCINATE 25 MG/1
25 TABLET, EXTENDED RELEASE ORAL DAILY
COMMUNITY

## 2023-12-04 RX ORDER — CINACALCET 90 MG/1
90 TABLET, FILM COATED ORAL DAILY
COMMUNITY

## 2023-12-04 RX ORDER — INSULIN LISPRO 100 [IU]/ML
INJECTION, SOLUTION INTRAVENOUS; SUBCUTANEOUS
COMMUNITY

## 2023-12-04 RX ORDER — ONDANSETRON 4 MG/1
4 TABLET, FILM COATED ORAL 3 TIMES DAILY
COMMUNITY

## 2023-12-04 RX ORDER — METOCLOPRAMIDE 5 MG/1
5 TABLET ORAL 2 TIMES DAILY
COMMUNITY

## 2023-12-04 RX ORDER — MIDODRINE HYDROCHLORIDE 10 MG/1
10 TABLET ORAL 4 TIMES DAILY
COMMUNITY

## 2023-12-04 RX ADMIN — ISOSORBIDE MONONITRATE 30 MG: 30 TABLET, EXTENDED RELEASE ORAL at 09:12

## 2023-12-04 RX ADMIN — FERROUS SULFATE TAB EC 325 MG (65 MG FE EQUIVALENT) 325 MG: 325 (65 FE) TABLET DELAYED RESPONSE at 09:09

## 2023-12-04 RX ADMIN — MIDODRINE HYDROCHLORIDE 10 MG: 5 TABLET ORAL at 21:16

## 2023-12-04 RX ADMIN — ASPIRIN 81 MG: 81 TABLET, CHEWABLE ORAL at 09:09

## 2023-12-04 RX ADMIN — SEVELAMER CARBONATE 800 MG: 800 TABLET, FILM COATED ORAL at 16:21

## 2023-12-04 RX ADMIN — CHLORPROMAZINE HYDROCHLORIDE 25 MG: 25 TABLET, FILM COATED ORAL at 22:38

## 2023-12-04 RX ADMIN — LANTHANUM CARBONATE 1000 MG: 500 TABLET, CHEWABLE ORAL at 16:22

## 2023-12-04 RX ADMIN — HYDROCODONE BITARTRATE AND ACETAMINOPHEN 1 TABLET: 5; 325 TABLET ORAL at 22:37

## 2023-12-04 RX ADMIN — Medication 6 MG: at 23:37

## 2023-12-04 RX ADMIN — SODIUM CHLORIDE, PRESERVATIVE FREE 10 ML: 5 INJECTION INTRAVENOUS at 09:12

## 2023-12-04 RX ADMIN — HEPARIN SODIUM 2000 UNITS: 1000 INJECTION INTRAVENOUS; SUBCUTANEOUS at 06:02

## 2023-12-04 RX ADMIN — ATORVASTATIN CALCIUM 80 MG: 80 TABLET, FILM COATED ORAL at 21:14

## 2023-12-04 RX ADMIN — PANTOPRAZOLE SODIUM 40 MG: 40 TABLET, DELAYED RELEASE ORAL at 09:08

## 2023-12-04 RX ADMIN — Medication 6 MG: at 00:01

## 2023-12-04 RX ADMIN — OLANZAPINE 5 MG: 5 TABLET, FILM COATED ORAL at 21:16

## 2023-12-04 RX ADMIN — SODIUM CHLORIDE, PRESERVATIVE FREE 10 ML: 5 INJECTION INTRAVENOUS at 22:00

## 2023-12-04 RX ADMIN — HYDROCODONE BITARTRATE AND ACETAMINOPHEN 1 TABLET: 5; 325 TABLET ORAL at 11:52

## 2023-12-04 RX ADMIN — SEVELAMER CARBONATE 800 MG: 800 TABLET, FILM COATED ORAL at 09:08

## 2023-12-04 RX ADMIN — CHLORPROMAZINE HYDROCHLORIDE 25 MG: 25 TABLET, FILM COATED ORAL at 16:22

## 2023-12-04 RX ADMIN — Medication 2000 UNITS: at 09:09

## 2023-12-04 RX ADMIN — Medication 1 TABLET: at 16:21

## 2023-12-04 ASSESSMENT — PAIN SCALES - GENERAL
PAINLEVEL_OUTOF10: 0
PAINLEVEL_OUTOF10: 4
PAINLEVEL_OUTOF10: 6
PAINLEVEL_OUTOF10: 8
PAINLEVEL_OUTOF10: 8

## 2023-12-04 ASSESSMENT — PAIN DESCRIPTION - ORIENTATION
ORIENTATION: RIGHT;LEFT;LOWER
ORIENTATION: RIGHT;LEFT;POSTERIOR;LOWER

## 2023-12-04 ASSESSMENT — PAIN DESCRIPTION - ONSET: ONSET: ON-GOING

## 2023-12-04 ASSESSMENT — PAIN DESCRIPTION - LOCATION
LOCATION: BACK;FOOT
LOCATION: BACK;BUTTOCKS
LOCATION: FOOT;BACK

## 2023-12-04 ASSESSMENT — PAIN DESCRIPTION - DESCRIPTORS
DESCRIPTORS: ACHING;DISCOMFORT
DESCRIPTORS: ACHING
DESCRIPTORS: DISCOMFORT

## 2023-12-04 ASSESSMENT — PAIN - FUNCTIONAL ASSESSMENT: PAIN_FUNCTIONAL_ASSESSMENT: ACTIVITIES ARE NOT PREVENTED

## 2023-12-04 ASSESSMENT — PAIN DESCRIPTION - FREQUENCY: FREQUENCY: INTERMITTENT

## 2023-12-04 NOTE — PLAN OF CARE
Problem: Chronic Conditions and Co-morbidities  Goal: Patient's chronic conditions and co-morbidity symptoms are monitored and maintained or improved  12/3/2023 2220 by Bri Currie RN  Outcome: Progressing  12/3/2023 1422 by Shagufta Aquino RN  Outcome: Progressing  Flowsheets (Taken 12/3/2023 1422)  Care Plan - Patient's Chronic Conditions and Co-Morbidity Symptoms are Monitored and Maintained or Improved:   Monitor and assess patient's chronic conditions and comorbid symptoms for stability, deterioration, or improvement   Collaborate with multidisciplinary team to address chronic and comorbid conditions and prevent exacerbation or deterioration   Update acute care plan with appropriate goals if chronic or comorbid symptoms are exacerbated and prevent overall improvement and discharge     Problem: Safety - Adult  Goal: Free from fall injury  12/3/2023 2220 by Naima John RN  Outcome: Progressing  Flowsheets (Taken 12/3/2023 2000)  Free From Fall Injury: Instruct family/caregiver on patient safety  12/3/2023 1422 by Shagufta Aquino RN  Outcome: Progressing  Flowsheets (Taken 12/3/2023 1422)  Free From Fall Injury:   Instruct family/caregiver on patient safety   Based on caregiver fall risk screen, instruct family/caregiver to ask for assistance with transferring infant if caregiver noted to have fall risk factors     Problem: Discharge Planning  Goal: Discharge to home or other facility with appropriate resources  12/3/2023 2220 by Naima John RN  Outcome: Progressing  12/3/2023 1422 by Shagufta Aquino RN  Outcome: Progressing  Flowsheets (Taken 12/3/2023 1422)  Discharge to home or other facility with appropriate resources:   Identify barriers to discharge with patient and caregiver   Arrange for needed discharge resources and transportation as appropriate   Identify discharge learning needs (meds, wound care, etc)   Refer to discharge planning if patient needs post-hospital

## 2023-12-04 NOTE — PROCEDURES
INSTRUMENTAL SWALLOW REPORT  MODIFIED BARIUM SWALLOW    NAME: Rogerio Sommer   : 1975  MRN: [id number]       Date of Eval: 2023              Referring Diagnosis(es):      Past Medical History:  has a past medical history of Abscess, gluteal, left, CVA (cerebral vascular accident) ([address]), Diabetes mellitus ([address]), End stage renal disease ([address]), All's gangrene of scrotum, GERD (gastroesophageal reflux disease), Hemodialysis patient ([address]), Hyperlipidemia, Hypertension, and Presence of permanent central venous catheter. Past Surgical History:  has a past surgical history that includes Cholecystectomy; Tunneled venous catheter placement; Scrotal surgery (Bilateral, 10/11/2019); Scrotal surgery (Bilateral, 10/15/2019); Testicle removal (Right, 10/18/2019); Scrotal surgery; Dialysis fistula creation (Left, 2020); other surgical history (Left, 2020); other surgical history (2020); other surgical history (2020); Cardiac surgery; CT BIOPSY DEEP BONE PERCUTANEOUS (2023); CT BIOPSY DEEP BONE PERCUTANEOUS (2023); Cardiac catheterization (2023); and Cardiac procedure (N/A, 2023). Type of Study: Initial MBS      Patient Complaints/Reason for Referral:  Rogerio Sommer was referred for a MBS to assess the efficiency of his/her swallow function, assess for aspiration, and to make recommendations regarding safe dietary consistencies, effective compensatory strategies, and safe eating environment. Onset of problem:       The patient is a pleasant 46 y.o. male with PMHx of HTN, T2DM, ESRD on HD MWF, GERD, Ischemic cardiomyopathy, left thalamic ischemic stroke leading to Right hemiparesis and dysarthria(2023) presented with SOB and chest pain for last few weeks. Patient had NSTEMI few months ago at [facility] where he underwent cath and was found to have MVCAD and underwent CABG.      Patient came to [facility] with the complaint of SOB and central

## 2023-12-04 NOTE — DISCHARGE SUMMARY
[facility] Internal Medicine    Discharge Summary     Patient ID: Scar French  :  1975   MRN: [id number]     ACCOUNT:  [de-identified]   Patient's PCP: Marry Baltazar MD  Admit Date: 2023   Discharge Date: 2023    Length of Stay: 5  Code Status:  Full Code  Admitting Physician: Cecilia Sanchez MD  Discharge Physician: Codey Farrell MD     Active Discharge Diagnoses:     Primary Problem  NSTEMI (non-ST elevated myocardial infarction) [facility])      [address] Problems    Diagnosis Date Noted    Acute systolic congestive heart failure ([address]) [I50.21] 2023     Priority: High    Renovascular hypertension [I15.0] 2023     Priority: High    Coronary artery disease [I25.10] 2023     Priority: High    Atherosclerotic heart disease [I25.10] 2023     Priority: High    ESRD on dialysis [facility]) [N18.6, Z99.2] 2023     Priority: High    NSTEMI (non-ST elevated myocardial infarction) ([address]) [I21.4] 2023       Admission Condition:  poor     Discharged Condition: fair    Hospital Stay:     Hospital Course:  Scar French is a 46 y.o. male who was admitted for the management of NSTEMI (non-ST elevated myocardial infarction) ([address]) , presented to ER with Shortness of Breath and Chest Pain        Significant therapeutic interventions:     Significant Diagnostic Studies:   Labs / Micro:        Radiology:    Vascular duplex vein mapping lower bilateral    Addendum Date: 2023      Right: Vessel diameters as noted in the table below. Left: Vessel diameters as noted in the table below. Result Date: 2023    Vessel diameters as noted in the table below. Vessel diameters as noted in the table below. Vascular duplex upper extremity arteries bilateral    Result Date: 2023    Right: Patent arteries. Measurements listed below. Left: Patent arteries. AVF present. Measurements listed below.      Vascular duplex

## 2023-12-04 NOTE — PLAN OF CARE
Problem: Chronic Conditions and Co-morbidities  Goal: Patient's chronic conditions and co-morbidity symptoms are monitored and maintained or improved  Outcome: Progressing  Flowsheets (Taken 12/3/2023 2000 by Bri Currie RN)  Care Plan - Patient's Chronic Conditions and Co-Morbidity Symptoms are Monitored and Maintained or Improved: Monitor and assess patient's chronic conditions and comorbid symptoms for stability, deterioration, or improvement     Problem: Safety - Adult  Goal: Free from fall injury  Outcome: Progressing  Flowsheets (Taken 12/4/2023 1411)  Free From Fall Injury: Instruct family/caregiver on patient safety     Problem: Discharge Planning  Goal: Discharge to home or other facility with appropriate resources  Outcome: Progressing  Flowsheets (Taken 12/3/2023 2000 by Bri Currie RN)  Discharge to home or other facility with appropriate resources: Identify barriers to discharge with patient and caregiver     Problem: Skin/Tissue Integrity  Goal: Absence of new skin breakdown  Description: 1. Monitor for areas of redness and/or skin breakdown  2. Assess vascular access sites hourly  3. Every 4-6 hours minimum:  Change oxygen saturation probe site  4. Every 4-6 hours:  If on nasal continuous positive airway pressure, respiratory therapy assess nares and determine need for appliance change or resting period.   Outcome: Progressing     Problem: ABCDS Injury Assessment  Goal: Absence of physical injury  Outcome: Progressing  Flowsheets (Taken 12/3/2023 2000 by Bri Currie RN)  Absence of Physical Injury: Implement safety measures based on patient assessment     Problem: Pain  Goal: Verbalizes/displays adequate comfort level or baseline comfort level  Outcome: Progressing  Flowsheets (Taken 12/4/2023 0356 by Bri Currie RN)  Verbalizes/displays adequate comfort level or baseline comfort level: Encourage patient to monitor pain and request assistance     Problem:

## 2023-12-04 NOTE — DISCHARGE SUMMARY
[facility] Internal Medicine    Discharge Summary     Patient ID: Rogerio Sommer  :  1975   MRN: [id number]     ACCOUNT:  [de-identified]   Patient's PCP: Moiz Galarza MD  Admit Date: 2023   Discharge Date: 2023    Length of Stay: 5  Code Status:  Full Code  Admitting Physician: Tita Albrecht MD  Discharge Physician: Chaparro Nunn MD     Active Discharge Diagnoses:     Primary Problem  NSTEMI (non-ST elevated myocardial infarction) [facility])      [address] Problems    Diagnosis Date Noted    Acute systolic congestive heart failure ([address]) [I50.21] 2023     Priority: High    Renovascular hypertension [I15.0] 2023     Priority: High    Coronary artery disease [I25.10] 2023     Priority: High    Atherosclerotic heart disease [I25.10] 2023     Priority: High    ESRD on dialysis [facility]) [N18.6, Z99.2] 2023     Priority: High    NSTEMI (non-ST elevated myocardial infarction) [facility]) [I21.4] 2023       Admission Condition:  poor     Discharged Condition: fair    Hospital Stay:     Hospital Course:  Rogerio Sommer is a 46 y.o. male who was admitted for the management of NSTEMI (non-ST elevated myocardial infarction) ([address]) , presented to ER with Shortness of Breath and Chest Pain  The patient is a 46 y.o.   Non- / non  male who presents with Shortness of Breath and Chest Pain   and he is admitted to the hospital for the management of chest pain  Patient, has past medical history multiple medical problem which include diabetes, hypertension, coronary artery disease s/p CABG, ischemic cardiomyopathy, ESRD on hemodialysis  Patient had a stroke during CABG surgery  Patient has residual weakness on right side, has speech difficulties, he is a resident of nursing home, he was brought to emergency room with complaints of chest pain  Chest pain was intermittent nature mainly in the center of the chest, no radiation

## 2023-12-05 PROBLEM — I10 HYPERTENSION, ESSENTIAL: Status: ACTIVE | Noted: 2023-12-05

## 2023-12-05 LAB
ANION GAP SERPL CALCULATED.3IONS-SCNC: 11 MMOL/L (ref 9–17)
ANTI-XA UNFRAC HEPARIN: 0.33 IU/L
BASOPHILS # BLD: 0.04 K/UL (ref 0–0.2)
BASOPHILS NFR BLD: 1 % (ref 0–2)
BUN SERPL-MCNC: 30 MG/DL (ref 6–20)
CALCIUM SERPL-MCNC: 10.4 MG/DL (ref 8.6–10.4)
CHLORIDE SERPL-SCNC: 93 MMOL/L (ref 98–107)
CO2 SERPL-SCNC: 29 MMOL/L (ref 20–31)
CREAT SERPL-MCNC: 3.8 MG/DL (ref 0.7–1.2)
EOSINOPHIL # BLD: 0.11 K/UL (ref 0–0.44)
EOSINOPHILS RELATIVE PERCENT: 2 % (ref 1–4)
ERYTHROCYTE [DISTWIDTH] IN BLOOD BY AUTOMATED COUNT: 17.6 % (ref 11.8–14.4)
GFR SERPL CREATININE-BSD FRML MDRD: 18 ML/MIN/1.73M2
GLUCOSE BLD-MCNC: 121 MG/DL (ref 75–110)
GLUCOSE BLD-MCNC: 140 MG/DL (ref 75–110)
GLUCOSE BLD-MCNC: 171 MG/DL (ref 75–110)
GLUCOSE BLD-MCNC: 219 MG/DL (ref 75–110)
GLUCOSE SERPL-MCNC: 117 MG/DL (ref 70–99)
HCT VFR BLD AUTO: 29.2 % (ref 40.7–50.3)
HGB BLD-MCNC: 8.8 G/DL (ref 13–17)
IMM GRANULOCYTES # BLD AUTO: 0.03 K/UL (ref 0–0.3)
IMM GRANULOCYTES NFR BLD: 1 %
LYMPHOCYTES NFR BLD: 1.2 K/UL (ref 1.1–3.7)
LYMPHOCYTES RELATIVE PERCENT: 23 % (ref 24–43)
MCH RBC QN AUTO: 32.1 PG (ref 25.2–33.5)
MCHC RBC AUTO-ENTMCNC: 30.1 G/DL (ref 28.4–34.8)
MCV RBC AUTO: 106.6 FL (ref 82.6–102.9)
MONOCYTES NFR BLD: 0.76 K/UL (ref 0.1–1.2)
MONOCYTES NFR BLD: 15 % (ref 3–12)
NEUTROPHILS NFR BLD: 58 % (ref 36–65)
NEUTS SEG NFR BLD: 2.99 K/UL (ref 1.5–8.1)
NRBC BLD-RTO: 0 PER 100 WBC
PLATELET # BLD AUTO: 193 K/UL (ref 138–453)
PMV BLD AUTO: 10.2 FL (ref 8.1–13.5)
POTASSIUM SERPL-SCNC: 4.4 MMOL/L (ref 3.7–5.3)
RBC # BLD AUTO: 2.74 M/UL (ref 4.21–5.77)
RBC # BLD: ABNORMAL 10*6/UL
RBC # BLD: ABNORMAL 10*6/UL
SODIUM SERPL-SCNC: 133 MMOL/L (ref 135–144)
WBC OTHER # BLD: 5.1 K/UL (ref 3.5–11.3)

## 2023-12-05 PROCEDURE — 6370000000 HC RX 637 (ALT 250 FOR IP): Performed by: STUDENT IN AN ORGANIZED HEALTH CARE EDUCATION/TRAINING PROGRAM

## 2023-12-05 PROCEDURE — 99232 SBSQ HOSP IP/OBS MODERATE 35: CPT | Performed by: INTERNAL MEDICINE

## 2023-12-05 PROCEDURE — 2060000000 HC ICU INTERMEDIATE R&B

## 2023-12-05 PROCEDURE — 6360000002 HC RX W HCPCS: Performed by: STUDENT IN AN ORGANIZED HEALTH CARE EDUCATION/TRAINING PROGRAM

## 2023-12-05 PROCEDURE — 92526 ORAL FUNCTION THERAPY: CPT

## 2023-12-05 PROCEDURE — 85027 COMPLETE CBC AUTOMATED: CPT

## 2023-12-05 PROCEDURE — 85520 HEPARIN ASSAY: CPT

## 2023-12-05 PROCEDURE — 36415 COLL VENOUS BLD VENIPUNCTURE: CPT

## 2023-12-05 PROCEDURE — 80048 BASIC METABOLIC PNL TOTAL CA: CPT

## 2023-12-05 PROCEDURE — 6370000000 HC RX 637 (ALT 250 FOR IP)

## 2023-12-05 PROCEDURE — 82947 ASSAY GLUCOSE BLOOD QUANT: CPT

## 2023-12-05 RX ORDER — MIDODRINE HYDROCHLORIDE 5 MG/1
10 TABLET ORAL EVERY 6 HOURS PRN
Status: DISCONTINUED | OUTPATIENT
Start: 2023-12-05 | End: 2023-12-05

## 2023-12-05 RX ORDER — MIDODRINE HYDROCHLORIDE 5 MG/1
10 TABLET ORAL EVERY 6 HOURS PRN
Status: DISCONTINUED | OUTPATIENT
Start: 2023-12-05 | End: 2023-12-10 | Stop reason: HOSPADM

## 2023-12-05 RX ADMIN — CHLORPROMAZINE HYDROCHLORIDE 25 MG: 25 TABLET, FILM COATED ORAL at 13:08

## 2023-12-05 RX ADMIN — Medication 1 TABLET: at 08:42

## 2023-12-05 RX ADMIN — SEVELAMER CARBONATE 800 MG: 800 TABLET, FILM COATED ORAL at 13:09

## 2023-12-05 RX ADMIN — FERROUS SULFATE TAB EC 325 MG (65 MG FE EQUIVALENT) 325 MG: 325 (65 FE) TABLET DELAYED RESPONSE at 08:41

## 2023-12-05 RX ADMIN — SEVELAMER CARBONATE 800 MG: 800 TABLET, FILM COATED ORAL at 08:41

## 2023-12-05 RX ADMIN — INSULIN LISPRO 1 UNITS: 100 INJECTION, SOLUTION INTRAVENOUS; SUBCUTANEOUS at 13:08

## 2023-12-05 RX ADMIN — Medication 2000 UNITS: at 08:41

## 2023-12-05 RX ADMIN — SEVELAMER CARBONATE 800 MG: 800 TABLET, FILM COATED ORAL at 17:41

## 2023-12-05 RX ADMIN — CHLORPROMAZINE HYDROCHLORIDE 25 MG: 25 TABLET, FILM COATED ORAL at 08:42

## 2023-12-05 RX ADMIN — LANTHANUM CARBONATE 1000 MG: 500 TABLET, CHEWABLE ORAL at 17:41

## 2023-12-05 RX ADMIN — HEPARIN SODIUM 23 UNITS/KG/HR: 10000 INJECTION, SOLUTION INTRAVENOUS at 19:29

## 2023-12-05 RX ADMIN — CHLORPROMAZINE HYDROCHLORIDE 25 MG: 25 TABLET, FILM COATED ORAL at 19:46

## 2023-12-05 RX ADMIN — PANTOPRAZOLE SODIUM 40 MG: 40 TABLET, DELAYED RELEASE ORAL at 08:41

## 2023-12-05 RX ADMIN — OLANZAPINE 5 MG: 5 TABLET, FILM COATED ORAL at 19:46

## 2023-12-05 RX ADMIN — HYDROCODONE BITARTRATE AND ACETAMINOPHEN 1 TABLET: 5; 325 TABLET ORAL at 19:43

## 2023-12-05 RX ADMIN — METOPROLOL TARTRATE 25 MG: 25 TABLET ORAL at 21:50

## 2023-12-05 RX ADMIN — LANTHANUM CARBONATE 1000 MG: 500 TABLET, CHEWABLE ORAL at 08:42

## 2023-12-05 RX ADMIN — MIDODRINE HYDROCHLORIDE 10 MG: 5 TABLET ORAL at 05:40

## 2023-12-05 RX ADMIN — HEPARIN SODIUM 23 UNITS/KG/HR: 10000 INJECTION, SOLUTION INTRAVENOUS at 05:40

## 2023-12-05 RX ADMIN — ASPIRIN 81 MG: 81 TABLET, CHEWABLE ORAL at 08:42

## 2023-12-05 RX ADMIN — ATORVASTATIN CALCIUM 80 MG: 80 TABLET, FILM COATED ORAL at 19:46

## 2023-12-05 ASSESSMENT — PAIN DESCRIPTION - LOCATION: LOCATION: BACK;FOOT

## 2023-12-05 ASSESSMENT — PAIN SCALES - GENERAL: PAINLEVEL_OUTOF10: 8

## 2023-12-05 ASSESSMENT — PAIN DESCRIPTION - DESCRIPTORS: DESCRIPTORS: ACHING

## 2023-12-05 ASSESSMENT — PAIN DESCRIPTION - ORIENTATION: ORIENTATION: MID

## 2023-12-05 NOTE — CARE COORDINATION
Transitional planning    Writer to room to discuss plan for transfer, patient spouse would prefer to transfer to [facility] , called access and spoke with Dolores March, primary RN updated.

## 2023-12-05 NOTE — CARE COORDINATION
Discuss case with Dr Hicks Fraction over weekend. Pt would be high surgical case. Poor conduit size. Low EF. We feel higher acuity center for redo Bypass evaluation.

## 2023-12-06 ENCOUNTER — APPOINTMENT (OUTPATIENT)
Dept: DIALYSIS | Age: 48
DRG: 280 | End: 2023-12-06
Attending: INTERNAL MEDICINE
Payer: MEDICARE

## 2023-12-06 ENCOUNTER — APPOINTMENT (OUTPATIENT)
Dept: GENERAL RADIOLOGY | Age: 48
DRG: 280 | End: 2023-12-06
Attending: INTERNAL MEDICINE
Payer: MEDICARE

## 2023-12-06 LAB
ANION GAP SERPL CALCULATED.3IONS-SCNC: 15 MMOL/L (ref 9–17)
ANTI-XA UNFRAC HEPARIN: 0.24 IU/L
ANTI-XA UNFRAC HEPARIN: 0.36 IU/L
ANTI-XA UNFRAC HEPARIN: 0.36 IU/L
BASOPHILS # BLD: 0.05 K/UL (ref 0–0.2)
BASOPHILS NFR BLD: 1 % (ref 0–2)
BUN SERPL-MCNC: 45 MG/DL (ref 6–20)
CALCIUM SERPL-MCNC: 10.5 MG/DL (ref 8.6–10.4)
CHLORIDE SERPL-SCNC: 93 MMOL/L (ref 98–107)
CO2 SERPL-SCNC: 26 MMOL/L (ref 20–31)
CREAT SERPL-MCNC: 5.1 MG/DL (ref 0.7–1.2)
EOSINOPHIL # BLD: 0.11 K/UL (ref 0–0.44)
EOSINOPHILS RELATIVE PERCENT: 2 % (ref 1–4)
ERYTHROCYTE [DISTWIDTH] IN BLOOD BY AUTOMATED COUNT: 17.6 % (ref 11.8–14.4)
GFR SERPL CREATININE-BSD FRML MDRD: 13 ML/MIN/1.73M2
GLUCOSE BLD-MCNC: 125 MG/DL (ref 75–110)
GLUCOSE BLD-MCNC: 126 MG/DL (ref 75–110)
GLUCOSE BLD-MCNC: 175 MG/DL (ref 75–110)
GLUCOSE BLD-MCNC: 178 MG/DL (ref 75–110)
GLUCOSE BLD-MCNC: 204 MG/DL (ref 75–110)
GLUCOSE SERPL-MCNC: 180 MG/DL (ref 70–99)
HCT VFR BLD AUTO: 30.2 % (ref 40.7–50.3)
HGB BLD-MCNC: 9 G/DL (ref 13–17)
IMM GRANULOCYTES # BLD AUTO: 0.03 K/UL (ref 0–0.3)
IMM GRANULOCYTES NFR BLD: 1 %
LYMPHOCYTES NFR BLD: 1.23 K/UL (ref 1.1–3.7)
LYMPHOCYTES RELATIVE PERCENT: 22 % (ref 24–43)
MCH RBC QN AUTO: 32.5 PG (ref 25.2–33.5)
MCHC RBC AUTO-ENTMCNC: 29.8 G/DL (ref 28.4–34.8)
MCV RBC AUTO: 109 FL (ref 82.6–102.9)
MONOCYTES NFR BLD: 0.87 K/UL (ref 0.1–1.2)
MONOCYTES NFR BLD: 16 % (ref 3–12)
NEUTROPHILS NFR BLD: 58 % (ref 36–65)
NEUTS SEG NFR BLD: 3.23 K/UL (ref 1.5–8.1)
NRBC BLD-RTO: 0 PER 100 WBC
PLATELET # BLD AUTO: 161 K/UL (ref 138–453)
PMV BLD AUTO: 10.2 FL (ref 8.1–13.5)
POTASSIUM SERPL-SCNC: 4.8 MMOL/L (ref 3.7–5.3)
RBC # BLD AUTO: 2.77 M/UL (ref 4.21–5.77)
RBC # BLD: ABNORMAL 10*6/UL
RBC # BLD: ABNORMAL 10*6/UL
SODIUM SERPL-SCNC: 134 MMOL/L (ref 135–144)
WBC OTHER # BLD: 5.5 K/UL (ref 3.5–11.3)

## 2023-12-06 PROCEDURE — 6360000002 HC RX W HCPCS: Performed by: STUDENT IN AN ORGANIZED HEALTH CARE EDUCATION/TRAINING PROGRAM

## 2023-12-06 PROCEDURE — 99233 SBSQ HOSP IP/OBS HIGH 50: CPT | Performed by: NURSE PRACTITIONER

## 2023-12-06 PROCEDURE — 6370000000 HC RX 637 (ALT 250 FOR IP)

## 2023-12-06 PROCEDURE — 85025 COMPLETE CBC W/AUTO DIFF WBC: CPT

## 2023-12-06 PROCEDURE — 90935 HEMODIALYSIS ONE EVALUATION: CPT

## 2023-12-06 PROCEDURE — 90935 HEMODIALYSIS ONE EVALUATION: CPT | Performed by: INTERNAL MEDICINE

## 2023-12-06 PROCEDURE — 71045 X-RAY EXAM CHEST 1 VIEW: CPT

## 2023-12-06 PROCEDURE — 36415 COLL VENOUS BLD VENIPUNCTURE: CPT

## 2023-12-06 PROCEDURE — 82947 ASSAY GLUCOSE BLOOD QUANT: CPT

## 2023-12-06 PROCEDURE — 6370000000 HC RX 637 (ALT 250 FOR IP): Performed by: STUDENT IN AN ORGANIZED HEALTH CARE EDUCATION/TRAINING PROGRAM

## 2023-12-06 PROCEDURE — 2060000000 HC ICU INTERMEDIATE R&B

## 2023-12-06 PROCEDURE — 80048 BASIC METABOLIC PNL TOTAL CA: CPT

## 2023-12-06 PROCEDURE — 82805 BLOOD GASES W/O2 SATURATION: CPT

## 2023-12-06 PROCEDURE — 85520 HEPARIN ASSAY: CPT

## 2023-12-06 PROCEDURE — 99232 SBSQ HOSP IP/OBS MODERATE 35: CPT | Performed by: INTERNAL MEDICINE

## 2023-12-06 RX ADMIN — Medication 2000 UNITS: at 08:03

## 2023-12-06 RX ADMIN — INSULIN LISPRO 1 UNITS: 100 INJECTION, SOLUTION INTRAVENOUS; SUBCUTANEOUS at 17:51

## 2023-12-06 RX ADMIN — CHLORPROMAZINE HYDROCHLORIDE 25 MG: 25 TABLET, FILM COATED ORAL at 14:28

## 2023-12-06 RX ADMIN — Medication 6 MG: at 02:20

## 2023-12-06 RX ADMIN — SEVELAMER CARBONATE 800 MG: 800 TABLET, FILM COATED ORAL at 17:51

## 2023-12-06 RX ADMIN — OLANZAPINE 5 MG: 5 TABLET, FILM COATED ORAL at 21:35

## 2023-12-06 RX ADMIN — ATORVASTATIN CALCIUM 80 MG: 80 TABLET, FILM COATED ORAL at 21:36

## 2023-12-06 RX ADMIN — SEVELAMER CARBONATE 800 MG: 800 TABLET, FILM COATED ORAL at 08:03

## 2023-12-06 RX ADMIN — CHLORPROMAZINE HYDROCHLORIDE 25 MG: 25 TABLET, FILM COATED ORAL at 08:03

## 2023-12-06 RX ADMIN — CHLORPROMAZINE HYDROCHLORIDE 25 MG: 25 TABLET, FILM COATED ORAL at 21:36

## 2023-12-06 RX ADMIN — ASPIRIN 81 MG: 81 TABLET, CHEWABLE ORAL at 08:03

## 2023-12-06 RX ADMIN — HYDROCODONE BITARTRATE AND ACETAMINOPHEN 1 TABLET: 5; 325 TABLET ORAL at 21:36

## 2023-12-06 RX ADMIN — METOPROLOL TARTRATE 25 MG: 25 TABLET ORAL at 21:35

## 2023-12-06 RX ADMIN — HEPARIN SODIUM 25 UNITS/KG/HR: 10000 INJECTION, SOLUTION INTRAVENOUS at 12:13

## 2023-12-06 RX ADMIN — FERROUS SULFATE TAB EC 325 MG (65 MG FE EQUIVALENT) 325 MG: 325 (65 FE) TABLET DELAYED RESPONSE at 08:03

## 2023-12-06 RX ADMIN — Medication 1 TABLET: at 08:03

## 2023-12-06 RX ADMIN — LANTHANUM CARBONATE 1000 MG: 500 TABLET, CHEWABLE ORAL at 14:28

## 2023-12-06 RX ADMIN — ISOSORBIDE MONONITRATE 30 MG: 30 TABLET, EXTENDED RELEASE ORAL at 08:03

## 2023-12-06 RX ADMIN — HYDROCODONE BITARTRATE AND ACETAMINOPHEN 1 TABLET: 5; 325 TABLET ORAL at 02:21

## 2023-12-06 RX ADMIN — SEVELAMER CARBONATE 800 MG: 800 TABLET, FILM COATED ORAL at 14:29

## 2023-12-06 RX ADMIN — PANTOPRAZOLE SODIUM 40 MG: 40 TABLET, DELAYED RELEASE ORAL at 08:03

## 2023-12-06 RX ADMIN — Medication 6 MG: at 22:46

## 2023-12-06 RX ADMIN — HEPARIN SODIUM 2000 UNITS: 1000 INJECTION INTRAVENOUS; SUBCUTANEOUS at 08:08

## 2023-12-06 ASSESSMENT — PAIN SCALES - GENERAL
PAINLEVEL_OUTOF10: 8
PAINLEVEL_OUTOF10: 7
PAINLEVEL_OUTOF10: 8
PAINLEVEL_OUTOF10: 7
PAINLEVEL_OUTOF10: 8
PAINLEVEL_OUTOF10: 8

## 2023-12-06 ASSESSMENT — PAIN DESCRIPTION - ORIENTATION
ORIENTATION: MID
ORIENTATION: LEFT
ORIENTATION: RIGHT;LEFT
ORIENTATION: MID;LEFT
ORIENTATION: LEFT;MID

## 2023-12-06 ASSESSMENT — PAIN DESCRIPTION - ONSET
ONSET: ON-GOING
ONSET: ON-GOING

## 2023-12-06 ASSESSMENT — PAIN DESCRIPTION - DESCRIPTORS
DESCRIPTORS: ACHING
DESCRIPTORS: TENDER;ACHING;DISCOMFORT
DESCRIPTORS: ACHING

## 2023-12-06 ASSESSMENT — PAIN DESCRIPTION - FREQUENCY
FREQUENCY: INTERMITTENT
FREQUENCY: INTERMITTENT

## 2023-12-06 ASSESSMENT — PAIN DESCRIPTION - LOCATION
LOCATION: BACK;FOOT
LOCATION: BACK;FOOT
LOCATION: LEG;BUTTOCKS
LOCATION: BACK;FOOT
LOCATION: BACK;FOOT

## 2023-12-06 ASSESSMENT — PAIN DESCRIPTION - PAIN TYPE
TYPE: CHRONIC PAIN
TYPE: CHRONIC PAIN

## 2023-12-06 NOTE — PLAN OF CARE
Yesterday talked with the cardiologist from [facility]. Patient is accepted at [facility] awaiting bed placement.   We initiated the transfer to [facility] as per family's request.    Sy Brennan MD  Internal Medicine Resident,PGY [place]

## 2023-12-06 NOTE — PLAN OF CARE

## 2023-12-06 NOTE — PLAN OF CARE
Problem: Chronic Conditions and Co-morbidities  Goal: Patient's chronic conditions and co-morbidity symptoms are monitored and maintained or improved  12/6/2023 0919 by Pauline Gray RN  Outcome: Progressing  Flowsheets (Taken 12/5/2023 0800 by Kekio Ram RN)  Care Plan - Patient's Chronic Conditions and Co-Morbidity Symptoms are Monitored and Maintained or Improved: Monitor and assess patient's chronic conditions and comorbid symptoms for stability, deterioration, or improvement  12/6/2023 0203 by Henri Alvarez RN  Outcome: Progressing     Problem: Safety - Adult  Goal: Free from fall injury  12/6/2023 0919 by Pauline Gray RN  Outcome: Progressing  Flowsheets (Taken 12/4/2023 1411)  Free From Fall Injury: Instruct family/caregiver on patient safety  12/6/2023 0203 by Henri Alvarez RN  Outcome: Progressing     Problem: Discharge Planning  Goal: Discharge to home or other facility with appropriate resources  12/6/2023 0919 by Pauline Gray RN  Outcome: Progressing  Flowsheets (Taken 12/5/2023 0800 by Keiko Ram RN)  Discharge to home or other facility with appropriate resources: Identify barriers to discharge with patient and caregiver  12/6/2023 0203 by Henri Alvarez RN  Outcome: Progressing     Problem: Skin/Tissue Integrity  Goal: Absence of new skin breakdown  Description: 1. Monitor for areas of redness and/or skin breakdown  2. Assess vascular access sites hourly  3. Every 4-6 hours minimum:  Change oxygen saturation probe site  4. Every 4-6 hours:  If on nasal continuous positive airway pressure, respiratory therapy assess nares and determine need for appliance change or resting period.   12/6/2023 0919 by Pauline Gray RN  Outcome: Progressing  12/6/2023 0203 by Henri Alvarez RN  Outcome: Progressing     Problem: ABCDS Injury Assessment  Goal: Absence of physical injury  12/6/2023 0919 by Pauline Gray RN  Outcome: Progressing  Flowsheets

## 2023-12-07 LAB
ANION GAP SERPL CALCULATED.3IONS-SCNC: 13 MMOL/L (ref 9–17)
ANTI-XA UNFRAC HEPARIN: 0.33 IU/L
BASOPHILS # BLD: 0.05 K/UL (ref 0–0.2)
BASOPHILS NFR BLD: 1 % (ref 0–2)
BODY TEMPERATURE: 37
BUN SERPL-MCNC: 30 MG/DL (ref 6–20)
CALCIUM SERPL-MCNC: 10.6 MG/DL (ref 8.6–10.4)
CHLORIDE SERPL-SCNC: 92 MMOL/L (ref 98–107)
CO2 SERPL-SCNC: 28 MMOL/L (ref 20–31)
COHGB MFR BLD: 1.5 % (ref 0–5)
CREAT SERPL-MCNC: 3.8 MG/DL (ref 0.7–1.2)
EOSINOPHIL # BLD: 0.13 K/UL (ref 0–0.44)
EOSINOPHILS RELATIVE PERCENT: 3 % (ref 1–4)
ERYTHROCYTE [DISTWIDTH] IN BLOOD BY AUTOMATED COUNT: 17.7 % (ref 11.8–14.4)
FIO2 ON VENT: ABNORMAL %
GFR SERPL CREATININE-BSD FRML MDRD: 18 ML/MIN/1.73M2
GLUCOSE BLD-MCNC: 131 MG/DL (ref 75–110)
GLUCOSE BLD-MCNC: 138 MG/DL (ref 75–110)
GLUCOSE BLD-MCNC: 148 MG/DL (ref 75–110)
GLUCOSE BLD-MCNC: 179 MG/DL (ref 75–110)
GLUCOSE BLD-MCNC: 201 MG/DL (ref 75–110)
GLUCOSE SERPL-MCNC: 152 MG/DL (ref 70–99)
HCO3 VENOUS: 28.8 MMOL/L (ref 24–30)
HCT VFR BLD AUTO: 27.5 % (ref 40.7–50.3)
HGB BLD-MCNC: 8.2 G/DL (ref 13–17)
IMM GRANULOCYTES # BLD AUTO: <0.03 K/UL (ref 0–0.3)
IMM GRANULOCYTES NFR BLD: 0 %
LYMPHOCYTES NFR BLD: 1.35 K/UL (ref 1.1–3.7)
LYMPHOCYTES RELATIVE PERCENT: 28 % (ref 24–43)
MCH RBC QN AUTO: 32.7 PG (ref 25.2–33.5)
MCHC RBC AUTO-ENTMCNC: 29.8 G/DL (ref 28.4–34.8)
MCV RBC AUTO: 109.6 FL (ref 82.6–102.9)
MONOCYTES NFR BLD: 0.65 K/UL (ref 0.1–1.2)
MONOCYTES NFR BLD: 13 % (ref 3–12)
NEUTROPHILS NFR BLD: 55 % (ref 36–65)
NEUTS SEG NFR BLD: 2.67 K/UL (ref 1.5–8.1)
NRBC BLD-RTO: 0 PER 100 WBC
O2 SAT, VEN: 87.5 % (ref 60–85)
PCO2, VEN: 42 MM HG (ref 39–55)
PH VENOUS: 7.45 (ref 7.32–7.42)
PHOSPHATE SERPL-MCNC: 4.4 MG/DL (ref 2.5–4.5)
PLATELET # BLD AUTO: 151 K/UL (ref 138–453)
PMV BLD AUTO: 10.4 FL (ref 8.1–13.5)
PO2, VEN: 54.7 MM HG (ref 30–50)
POSITIVE BASE EXCESS, VEN: 4.7 MMOL/L (ref 0–2)
POTASSIUM SERPL-SCNC: 4.1 MMOL/L (ref 3.7–5.3)
RBC # BLD AUTO: 2.51 M/UL (ref 4.21–5.77)
RBC # BLD: ABNORMAL 10*6/UL
RBC # BLD: ABNORMAL 10*6/UL
SODIUM SERPL-SCNC: 133 MMOL/L (ref 135–144)
WBC OTHER # BLD: 4.9 K/UL (ref 3.5–11.3)

## 2023-12-07 PROCEDURE — 6360000002 HC RX W HCPCS: Performed by: STUDENT IN AN ORGANIZED HEALTH CARE EDUCATION/TRAINING PROGRAM

## 2023-12-07 PROCEDURE — 99232 SBSQ HOSP IP/OBS MODERATE 35: CPT | Performed by: INTERNAL MEDICINE

## 2023-12-07 PROCEDURE — 82947 ASSAY GLUCOSE BLOOD QUANT: CPT

## 2023-12-07 PROCEDURE — 6370000000 HC RX 637 (ALT 250 FOR IP): Performed by: STUDENT IN AN ORGANIZED HEALTH CARE EDUCATION/TRAINING PROGRAM

## 2023-12-07 PROCEDURE — 84100 ASSAY OF PHOSPHORUS: CPT

## 2023-12-07 PROCEDURE — 97166 OT EVAL MOD COMPLEX 45 MIN: CPT

## 2023-12-07 PROCEDURE — 36415 COLL VENOUS BLD VENIPUNCTURE: CPT

## 2023-12-07 PROCEDURE — 85025 COMPLETE CBC W/AUTO DIFF WBC: CPT

## 2023-12-07 PROCEDURE — 80048 BASIC METABOLIC PNL TOTAL CA: CPT

## 2023-12-07 PROCEDURE — 99233 SBSQ HOSP IP/OBS HIGH 50: CPT | Performed by: NURSE PRACTITIONER

## 2023-12-07 PROCEDURE — 2580000003 HC RX 258: Performed by: STUDENT IN AN ORGANIZED HEALTH CARE EDUCATION/TRAINING PROGRAM

## 2023-12-07 PROCEDURE — 2060000000 HC ICU INTERMEDIATE R&B

## 2023-12-07 PROCEDURE — 6370000000 HC RX 637 (ALT 250 FOR IP): Performed by: INTERNAL MEDICINE

## 2023-12-07 PROCEDURE — 85520 HEPARIN ASSAY: CPT

## 2023-12-07 PROCEDURE — 6370000000 HC RX 637 (ALT 250 FOR IP)

## 2023-12-07 PROCEDURE — 97530 THERAPEUTIC ACTIVITIES: CPT

## 2023-12-07 PROCEDURE — 97535 SELF CARE MNGMENT TRAINING: CPT

## 2023-12-07 RX ORDER — HYDROXYZINE HYDROCHLORIDE 10 MG/1
10 TABLET, FILM COATED ORAL 3 TIMES DAILY PRN
Status: DISCONTINUED | OUTPATIENT
Start: 2023-12-07 | End: 2023-12-10 | Stop reason: HOSPADM

## 2023-12-07 RX ORDER — ONDANSETRON 2 MG/ML
2 INJECTION INTRAMUSCULAR; INTRAVENOUS ONCE
Status: DISCONTINUED | OUTPATIENT
Start: 2023-12-07 | End: 2023-12-07

## 2023-12-07 RX ORDER — ALPRAZOLAM 0.25 MG/1
0.25 TABLET ORAL NIGHTLY PRN
Status: DISCONTINUED | OUTPATIENT
Start: 2023-12-07 | End: 2023-12-07

## 2023-12-07 RX ORDER — PROCHLORPERAZINE MALEATE 5 MG/1
5 TABLET ORAL EVERY 6 HOURS PRN
Status: DISCONTINUED | OUTPATIENT
Start: 2023-12-07 | End: 2023-12-10 | Stop reason: HOSPADM

## 2023-12-07 RX ADMIN — MIDODRINE HYDROCHLORIDE 10 MG: 5 TABLET ORAL at 09:46

## 2023-12-07 RX ADMIN — ASPIRIN 81 MG: 81 TABLET, CHEWABLE ORAL at 09:12

## 2023-12-07 RX ADMIN — PROCHLORPERAZINE MALEATE 5 MG: 5 TABLET ORAL at 15:29

## 2023-12-07 RX ADMIN — INSULIN LISPRO 1 UNITS: 100 INJECTION, SOLUTION INTRAVENOUS; SUBCUTANEOUS at 12:22

## 2023-12-07 RX ADMIN — SEVELAMER CARBONATE 800 MG: 800 TABLET, FILM COATED ORAL at 09:12

## 2023-12-07 RX ADMIN — FERROUS SULFATE TAB EC 325 MG (65 MG FE EQUIVALENT) 325 MG: 325 (65 FE) TABLET DELAYED RESPONSE at 09:13

## 2023-12-07 RX ADMIN — PANTOPRAZOLE SODIUM 40 MG: 40 TABLET, DELAYED RELEASE ORAL at 09:12

## 2023-12-07 RX ADMIN — OLANZAPINE 5 MG: 5 TABLET, FILM COATED ORAL at 20:01

## 2023-12-07 RX ADMIN — PROCHLORPERAZINE MALEATE 5 MG: 5 TABLET ORAL at 09:12

## 2023-12-07 RX ADMIN — SEVELAMER CARBONATE 800 MG: 800 TABLET, FILM COATED ORAL at 17:16

## 2023-12-07 RX ADMIN — HEPARIN SODIUM 25 UNITS/KG/HR: 10000 INJECTION, SOLUTION INTRAVENOUS at 01:27

## 2023-12-07 RX ADMIN — CHLORPROMAZINE HYDROCHLORIDE 25 MG: 25 TABLET, FILM COATED ORAL at 20:01

## 2023-12-07 RX ADMIN — ISOSORBIDE MONONITRATE 30 MG: 30 TABLET, EXTENDED RELEASE ORAL at 09:12

## 2023-12-07 RX ADMIN — HYDROXYZINE HYDROCHLORIDE 10 MG: 10 TABLET ORAL at 15:29

## 2023-12-07 RX ADMIN — METOPROLOL TARTRATE 25 MG: 25 TABLET ORAL at 20:01

## 2023-12-07 RX ADMIN — SODIUM CHLORIDE, PRESERVATIVE FREE 10 ML: 5 INJECTION INTRAVENOUS at 09:14

## 2023-12-07 RX ADMIN — HEPARIN SODIUM 25 UNITS/KG/HR: 10000 INJECTION, SOLUTION INTRAVENOUS at 13:17

## 2023-12-07 RX ADMIN — CHLORPROMAZINE HYDROCHLORIDE 25 MG: 25 TABLET, FILM COATED ORAL at 09:12

## 2023-12-07 RX ADMIN — METOPROLOL TARTRATE 25 MG: 25 TABLET ORAL at 09:46

## 2023-12-07 RX ADMIN — HYDROCODONE BITARTRATE AND ACETAMINOPHEN 1 TABLET: 5; 325 TABLET ORAL at 14:02

## 2023-12-07 RX ADMIN — SEVELAMER CARBONATE 800 MG: 800 TABLET, FILM COATED ORAL at 12:22

## 2023-12-07 RX ADMIN — ATORVASTATIN CALCIUM 80 MG: 80 TABLET, FILM COATED ORAL at 20:01

## 2023-12-07 RX ADMIN — CHLORPROMAZINE HYDROCHLORIDE 25 MG: 25 TABLET, FILM COATED ORAL at 13:34

## 2023-12-07 RX ADMIN — Medication 1 TABLET: at 09:12

## 2023-12-07 ASSESSMENT — PAIN DESCRIPTION - LOCATION: LOCATION: BACK

## 2023-12-07 ASSESSMENT — PAIN DESCRIPTION - DESCRIPTORS: DESCRIPTORS: ACHING

## 2023-12-07 ASSESSMENT — PAIN DESCRIPTION - ORIENTATION: ORIENTATION: LOWER

## 2023-12-07 ASSESSMENT — PAIN SCALES - GENERAL: PAINLEVEL_OUTOF10: 9

## 2023-12-07 NOTE — PLAN OF CARE
Problem: Chronic Conditions and Co-morbidities  Goal: Patient's chronic conditions and co-morbidity symptoms are monitored and maintained or improved  12/7/2023 1053 by Jurline Hammans, RN  Outcome: Progressing  Flowsheets (Taken 12/5/2023 0800 by Hodan Goodman RN)  Care Plan - Patient's Chronic Conditions and Co-Morbidity Symptoms are Monitored and Maintained or Improved: Monitor and assess patient's chronic conditions and comorbid symptoms for stability, deterioration, or improvement  12/7/2023 0317 by Annamaria Escobar RN  Outcome: Progressing     Problem: Safety - Adult  Goal: Free from fall injury  12/7/2023 1053 by Jurline Hammans, RN  Outcome: Progressing  Flowsheets (Taken 12/7/2023 1050)  Free From Fall Injury: Instruct family/caregiver on patient safety  12/7/2023 0317 by Annamaria Escobar RN  Outcome: Progressing     Problem: Discharge Planning  Goal: Discharge to home or other facility with appropriate resources  12/7/2023 1053 by Jurline Hammans, RN  Outcome: Progressing  Flowsheets (Taken 12/5/2023 0800 by Hodan Goodman RN)  Discharge to home or other facility with appropriate resources: Identify barriers to discharge with patient and caregiver  12/7/2023 0317 by Annamaria Escobar RN  Outcome: Progressing     Problem: Skin/Tissue Integrity  Goal: Absence of new skin breakdown  Description: 1. Monitor for areas of redness and/or skin breakdown  2. Assess vascular access sites hourly  3. Every 4-6 hours minimum:  Change oxygen saturation probe site  4. Every 4-6 hours:  If on nasal continuous positive airway pressure, respiratory therapy assess nares and determine need for appliance change or resting period.   12/7/2023 1053 by Jurline Hammans, RN  Outcome: Progressing  12/7/2023 0317 by Annamaria Escobar RN  Outcome: Progressing     Problem: ABCDS Injury Assessment  Goal: Absence of physical injury  12/7/2023 1053 by Jurline Hammans, RN  Outcome: Progressing  Flowsheets (Taken 12/7/2023

## 2023-12-07 NOTE — PLAN OF CARE
Problem: Chronic Conditions and Co-morbidities  Goal: Patient's chronic conditions and co-morbidity symptoms are monitored and maintained or improved  Outcome: Progressing     Problem: Safety - Adult  Goal: Free from fall injury  Outcome: Progressing     Problem: Discharge Planning  Goal: Discharge to home or other facility with appropriate resources  Outcome: Progressing     Problem: Skin/Tissue Integrity  Goal: Absence of new skin breakdown  Description: 1. Monitor for areas of redness and/or skin breakdown  2. Assess vascular access sites hourly  3. Every 4-6 hours minimum:  Change oxygen saturation probe site  4. Every 4-6 hours:  If on nasal continuous positive airway pressure, respiratory therapy assess nares and determine need for appliance change or resting period.   Outcome: Progressing     Problem: ABCDS Injury Assessment  Goal: Absence of physical injury  Outcome: Progressing     Problem: Pain  Goal: Verbalizes/displays adequate comfort level or baseline comfort level  Outcome: Progressing     Problem: Nutrition Deficit:  Goal: Optimize nutritional status  12/7/2023 0317 by Tanner Gill RN  Outcome: Progressing  12/6/2023 1452 by Danyell Serrano MS, RD, LD  Outcome: Progressing  Flowsheets (Taken 12/6/2023 1439)  Nutrient intake appropriate for improving, restoring, or maintaining nutritional needs:   Assess nutritional status and recommend course of action   Monitor oral intake, labs, and treatment plans   Recommend appropriate diets, oral nutritional supplements, and vitamin/mineral supplements

## 2023-12-08 LAB
ANION GAP SERPL CALCULATED.3IONS-SCNC: 18 MMOL/L (ref 9–17)
ANTI-XA UNFRAC HEPARIN: 0.43 IU/L
BASOPHILS # BLD: 0.05 K/UL (ref 0–0.2)
BASOPHILS NFR BLD: 1 % (ref 0–2)
BUN SERPL-MCNC: 42 MG/DL (ref 6–20)
CALCIUM SERPL-MCNC: 10.8 MG/DL (ref 8.6–10.4)
CHLORIDE SERPL-SCNC: 92 MMOL/L (ref 98–107)
CO2 SERPL-SCNC: 25 MMOL/L (ref 20–31)
CREAT SERPL-MCNC: 4.9 MG/DL (ref 0.7–1.2)
EOSINOPHIL # BLD: 0.13 K/UL (ref 0–0.44)
EOSINOPHILS RELATIVE PERCENT: 3 % (ref 1–4)
ERYTHROCYTE [DISTWIDTH] IN BLOOD BY AUTOMATED COUNT: 17.7 % (ref 11.8–14.4)
GFR SERPL CREATININE-BSD FRML MDRD: 14 ML/MIN/1.73M2
GLUCOSE BLD-MCNC: 125 MG/DL (ref 75–110)
GLUCOSE BLD-MCNC: 130 MG/DL (ref 75–110)
GLUCOSE BLD-MCNC: 167 MG/DL (ref 75–110)
GLUCOSE SERPL-MCNC: 128 MG/DL (ref 70–99)
HCT VFR BLD AUTO: 30.6 % (ref 40.7–50.3)
HGB BLD-MCNC: 8.9 G/DL (ref 13–17)
IMM GRANULOCYTES # BLD AUTO: <0.03 K/UL (ref 0–0.3)
IMM GRANULOCYTES NFR BLD: 0 %
LYMPHOCYTES NFR BLD: 1.29 K/UL (ref 1.1–3.7)
LYMPHOCYTES RELATIVE PERCENT: 29 % (ref 24–43)
MCH RBC QN AUTO: 31.9 PG (ref 25.2–33.5)
MCHC RBC AUTO-ENTMCNC: 29.1 G/DL (ref 28.4–34.8)
MCV RBC AUTO: 109.7 FL (ref 82.6–102.9)
MONOCYTES NFR BLD: 0.54 K/UL (ref 0.1–1.2)
MONOCYTES NFR BLD: 12 % (ref 3–12)
NEUTROPHILS NFR BLD: 55 % (ref 36–65)
NEUTS SEG NFR BLD: 2.43 K/UL (ref 1.5–8.1)
NRBC BLD-RTO: 0 PER 100 WBC
PHOSPHATE SERPL-MCNC: 5.5 MG/DL (ref 2.5–4.5)
PLATELET # BLD AUTO: 159 K/UL (ref 138–453)
PMV BLD AUTO: 11.3 FL (ref 8.1–13.5)
POTASSIUM SERPL-SCNC: 4.9 MMOL/L (ref 3.7–5.3)
RBC # BLD AUTO: 2.79 M/UL (ref 4.21–5.77)
RBC # BLD: ABNORMAL 10*6/UL
RBC # BLD: ABNORMAL 10*6/UL
SODIUM SERPL-SCNC: 135 MMOL/L (ref 135–144)
WBC OTHER # BLD: 4.5 K/UL (ref 3.5–11.3)

## 2023-12-08 PROCEDURE — 94761 N-INVAS EAR/PLS OXIMETRY MLT: CPT

## 2023-12-08 PROCEDURE — 2700000000 HC OXYGEN THERAPY PER DAY

## 2023-12-08 PROCEDURE — 82947 ASSAY GLUCOSE BLOOD QUANT: CPT

## 2023-12-08 PROCEDURE — 90935 HEMODIALYSIS ONE EVALUATION: CPT | Performed by: INTERNAL MEDICINE

## 2023-12-08 PROCEDURE — 90935 HEMODIALYSIS ONE EVALUATION: CPT

## 2023-12-08 PROCEDURE — 85520 HEPARIN ASSAY: CPT

## 2023-12-08 PROCEDURE — 2580000003 HC RX 258: Performed by: STUDENT IN AN ORGANIZED HEALTH CARE EDUCATION/TRAINING PROGRAM

## 2023-12-08 PROCEDURE — 85025 COMPLETE CBC W/AUTO DIFF WBC: CPT

## 2023-12-08 PROCEDURE — 6370000000 HC RX 637 (ALT 250 FOR IP): Performed by: STUDENT IN AN ORGANIZED HEALTH CARE EDUCATION/TRAINING PROGRAM

## 2023-12-08 PROCEDURE — 84100 ASSAY OF PHOSPHORUS: CPT

## 2023-12-08 PROCEDURE — 99232 SBSQ HOSP IP/OBS MODERATE 35: CPT | Performed by: INTERNAL MEDICINE

## 2023-12-08 PROCEDURE — 6360000002 HC RX W HCPCS: Performed by: STUDENT IN AN ORGANIZED HEALTH CARE EDUCATION/TRAINING PROGRAM

## 2023-12-08 PROCEDURE — 2060000000 HC ICU INTERMEDIATE R&B

## 2023-12-08 PROCEDURE — 36415 COLL VENOUS BLD VENIPUNCTURE: CPT

## 2023-12-08 PROCEDURE — 6370000000 HC RX 637 (ALT 250 FOR IP)

## 2023-12-08 PROCEDURE — 80048 BASIC METABOLIC PNL TOTAL CA: CPT

## 2023-12-08 RX ORDER — SODIUM CHLORIDE 9 MG/ML
INJECTION, SOLUTION INTRAVENOUS CONTINUOUS
Status: DISCONTINUED | OUTPATIENT
Start: 2023-12-08 | End: 2023-12-10 | Stop reason: HOSPADM

## 2023-12-08 RX ORDER — CHOLECALCIFEROL (VITAMIN D3) 125 MCG
5 CAPSULE ORAL NIGHTLY PRN
Status: DISCONTINUED | OUTPATIENT
Start: 2023-12-08 | End: 2023-12-10 | Stop reason: HOSPADM

## 2023-12-08 RX ADMIN — HYDROCODONE BITARTRATE AND ACETAMINOPHEN 1 TABLET: 5; 325 TABLET ORAL at 22:01

## 2023-12-08 RX ADMIN — METOPROLOL TARTRATE 25 MG: 25 TABLET ORAL at 22:00

## 2023-12-08 RX ADMIN — PANTOPRAZOLE SODIUM 40 MG: 40 TABLET, DELAYED RELEASE ORAL at 08:22

## 2023-12-08 RX ADMIN — SEVELAMER CARBONATE 800 MG: 800 TABLET, FILM COATED ORAL at 16:43

## 2023-12-08 RX ADMIN — ATORVASTATIN CALCIUM 80 MG: 80 TABLET, FILM COATED ORAL at 22:01

## 2023-12-08 RX ADMIN — HEPARIN SODIUM 25 UNITS/KG/HR: 10000 INJECTION, SOLUTION INTRAVENOUS at 02:38

## 2023-12-08 RX ADMIN — ACETAMINOPHEN 650 MG: 325 TABLET ORAL at 22:01

## 2023-12-08 RX ADMIN — SEVELAMER CARBONATE 800 MG: 800 TABLET, FILM COATED ORAL at 08:22

## 2023-12-08 RX ADMIN — SODIUM CHLORIDE, PRESERVATIVE FREE 10 ML: 5 INJECTION INTRAVENOUS at 20:22

## 2023-12-08 RX ADMIN — HYDROXYZINE HYDROCHLORIDE 10 MG: 10 TABLET ORAL at 21:59

## 2023-12-08 RX ADMIN — LANTHANUM CARBONATE 1000 MG: 500 TABLET, CHEWABLE ORAL at 16:43

## 2023-12-08 RX ADMIN — CHLORPROMAZINE HYDROCHLORIDE 25 MG: 25 TABLET, FILM COATED ORAL at 22:00

## 2023-12-08 RX ADMIN — HYDROCODONE BITARTRATE AND ACETAMINOPHEN 1 TABLET: 5; 325 TABLET ORAL at 01:03

## 2023-12-08 RX ADMIN — HEPARIN SODIUM 25 UNITS/KG/HR: 10000 INJECTION, SOLUTION INTRAVENOUS at 16:49

## 2023-12-08 RX ADMIN — FERROUS SULFATE TAB EC 325 MG (65 MG FE EQUIVALENT) 325 MG: 325 (65 FE) TABLET DELAYED RESPONSE at 08:24

## 2023-12-08 RX ADMIN — Medication 5 MG: at 22:05

## 2023-12-08 RX ADMIN — METOPROLOL TARTRATE 25 MG: 25 TABLET ORAL at 08:22

## 2023-12-08 RX ADMIN — ISOSORBIDE MONONITRATE 30 MG: 30 TABLET, EXTENDED RELEASE ORAL at 08:22

## 2023-12-08 RX ADMIN — OLANZAPINE 5 MG: 5 TABLET, FILM COATED ORAL at 22:00

## 2023-12-08 RX ADMIN — CHLORPROMAZINE HYDROCHLORIDE 25 MG: 25 TABLET, FILM COATED ORAL at 15:10

## 2023-12-08 RX ADMIN — ASPIRIN 81 MG: 81 TABLET, CHEWABLE ORAL at 08:22

## 2023-12-08 RX ADMIN — Medication 1 TABLET: at 08:22

## 2023-12-08 RX ADMIN — CHLORPROMAZINE HYDROCHLORIDE 25 MG: 25 TABLET, FILM COATED ORAL at 08:22

## 2023-12-08 RX ADMIN — Medication 5 MG: at 01:09

## 2023-12-08 RX ADMIN — SODIUM CHLORIDE, PRESERVATIVE FREE 10 ML: 5 INJECTION INTRAVENOUS at 08:23

## 2023-12-08 ASSESSMENT — PAIN SCALES - GENERAL
PAINLEVEL_OUTOF10: 0
PAINLEVEL_OUTOF10: 0

## 2023-12-08 ASSESSMENT — PAIN DESCRIPTION - LOCATION: LOCATION: FOOT;HIP

## 2023-12-08 ASSESSMENT — PAIN DESCRIPTION - DESCRIPTORS: DESCRIPTORS: ACHING

## 2023-12-08 NOTE — PLAN OF CARE
Problem: Chronic Conditions and Co-morbidities  Goal: Patient's chronic conditions and co-morbidity symptoms are monitored and maintained or improved  12/8/2023 1040 by Misbah Roth RN  Outcome: Progressing  12/8/2023 0253 by Silva Pinzon RN  Outcome: Progressing     Problem: Safety - Adult  Goal: Free from fall injury  12/8/2023 1040 by Misbah Roth RN  Outcome: Progressing  12/8/2023 0253 by Silva Pinzon RN  Outcome: Progressing     Problem: Discharge Planning  Goal: Discharge to home or other facility with appropriate resources  12/8/2023 1040 by Orin Cespedes RN  Outcome: Progressing  12/8/2023 0253 by Silva Pinzon RN  Outcome: Progressing     Problem: Skin/Tissue Integrity  Goal: Absence of new skin breakdown  Description: 1. Monitor for areas of redness and/or skin breakdown  2. Assess vascular access sites hourly  3. Every 4-6 hours minimum:  Change oxygen saturation probe site  4. Every 4-6 hours:  If on nasal continuous positive airway pressure, respiratory therapy assess nares and determine need for appliance change or resting period.   12/8/2023 1040 by Orin Cespedes RN  Outcome: Progressing  12/8/2023 0253 by Silva Pinzon RN  Outcome: Progressing     Problem: ABCDS Injury Assessment  Goal: Absence of physical injury  12/8/2023 1040 by Orin Cespedes RN  Outcome: Progressing  12/8/2023 0253 by Silva Pinzon RN  Outcome: Progressing     Problem: Pain  Goal: Verbalizes/displays adequate comfort level or baseline comfort level  12/8/2023 1040 by Misbah Roth RN  Outcome: Progressing  12/8/2023 0253 by Silva Pinzon RN  Outcome: Progressing     Problem: Nutrition Deficit:  Goal: Optimize nutritional status  12/8/2023 1040 by Orin Cespedes RN  Outcome: Progressing  12/8/2023 0253 by Silva Pinzon RN  Outcome: Progressing

## 2023-12-09 LAB
ANTI-XA UNFRAC HEPARIN: 0.36 IU/L
GLUCOSE BLD-MCNC: 186 MG/DL (ref 75–110)
GLUCOSE BLD-MCNC: 237 MG/DL (ref 75–110)

## 2023-12-09 PROCEDURE — 2060000000 HC ICU INTERMEDIATE R&B

## 2023-12-09 PROCEDURE — 99231 SBSQ HOSP IP/OBS SF/LOW 25: CPT | Performed by: INTERNAL MEDICINE

## 2023-12-09 PROCEDURE — 85520 HEPARIN ASSAY: CPT

## 2023-12-09 PROCEDURE — 6370000000 HC RX 637 (ALT 250 FOR IP)

## 2023-12-09 PROCEDURE — 99232 SBSQ HOSP IP/OBS MODERATE 35: CPT | Performed by: INTERNAL MEDICINE

## 2023-12-09 PROCEDURE — 97162 PT EVAL MOD COMPLEX 30 MIN: CPT

## 2023-12-09 PROCEDURE — 97530 THERAPEUTIC ACTIVITIES: CPT

## 2023-12-09 PROCEDURE — 99233 SBSQ HOSP IP/OBS HIGH 50: CPT | Performed by: NURSE PRACTITIONER

## 2023-12-09 PROCEDURE — 6360000002 HC RX W HCPCS: Performed by: STUDENT IN AN ORGANIZED HEALTH CARE EDUCATION/TRAINING PROGRAM

## 2023-12-09 PROCEDURE — 2580000003 HC RX 258: Performed by: STUDENT IN AN ORGANIZED HEALTH CARE EDUCATION/TRAINING PROGRAM

## 2023-12-09 PROCEDURE — 36415 COLL VENOUS BLD VENIPUNCTURE: CPT

## 2023-12-09 PROCEDURE — 82947 ASSAY GLUCOSE BLOOD QUANT: CPT

## 2023-12-09 PROCEDURE — 6370000000 HC RX 637 (ALT 250 FOR IP): Performed by: STUDENT IN AN ORGANIZED HEALTH CARE EDUCATION/TRAINING PROGRAM

## 2023-12-09 RX ADMIN — HEPARIN SODIUM 25 UNITS/KG/HR: 10000 INJECTION, SOLUTION INTRAVENOUS at 20:52

## 2023-12-09 RX ADMIN — CHLORPROMAZINE HYDROCHLORIDE 25 MG: 25 TABLET, FILM COATED ORAL at 09:40

## 2023-12-09 RX ADMIN — ATORVASTATIN CALCIUM 80 MG: 80 TABLET, FILM COATED ORAL at 20:54

## 2023-12-09 RX ADMIN — Medication 5 MG: at 22:03

## 2023-12-09 RX ADMIN — SEVELAMER CARBONATE 800 MG: 800 TABLET, FILM COATED ORAL at 12:19

## 2023-12-09 RX ADMIN — ISOSORBIDE MONONITRATE 30 MG: 30 TABLET, EXTENDED RELEASE ORAL at 09:41

## 2023-12-09 RX ADMIN — Medication 1 TABLET: at 09:41

## 2023-12-09 RX ADMIN — METOPROLOL TARTRATE 25 MG: 25 TABLET ORAL at 20:54

## 2023-12-09 RX ADMIN — HYDROXYZINE HYDROCHLORIDE 10 MG: 10 TABLET ORAL at 14:30

## 2023-12-09 RX ADMIN — PANTOPRAZOLE SODIUM 40 MG: 40 TABLET, DELAYED RELEASE ORAL at 09:41

## 2023-12-09 RX ADMIN — LANTHANUM CARBONATE 1000 MG: 500 TABLET, CHEWABLE ORAL at 17:14

## 2023-12-09 RX ADMIN — INSULIN LISPRO 1 UNITS: 100 INJECTION, SOLUTION INTRAVENOUS; SUBCUTANEOUS at 17:13

## 2023-12-09 RX ADMIN — HYDROCODONE BITARTRATE AND ACETAMINOPHEN 1 TABLET: 5; 325 TABLET ORAL at 22:03

## 2023-12-09 RX ADMIN — CHLORPROMAZINE HYDROCHLORIDE 25 MG: 25 TABLET, FILM COATED ORAL at 20:54

## 2023-12-09 RX ADMIN — LANTHANUM CARBONATE 1000 MG: 500 TABLET, CHEWABLE ORAL at 12:19

## 2023-12-09 RX ADMIN — CHLORPROMAZINE HYDROCHLORIDE 25 MG: 25 TABLET, FILM COATED ORAL at 14:42

## 2023-12-09 RX ADMIN — METOPROLOL TARTRATE 25 MG: 25 TABLET ORAL at 09:40

## 2023-12-09 RX ADMIN — SODIUM CHLORIDE, PRESERVATIVE FREE 10 ML: 5 INJECTION INTRAVENOUS at 09:42

## 2023-12-09 RX ADMIN — FERROUS SULFATE TAB EC 325 MG (65 MG FE EQUIVALENT) 325 MG: 325 (65 FE) TABLET DELAYED RESPONSE at 09:40

## 2023-12-09 RX ADMIN — LANTHANUM CARBONATE 1000 MG: 500 TABLET, CHEWABLE ORAL at 09:39

## 2023-12-09 RX ADMIN — HEPARIN SODIUM 25 UNITS/KG/HR: 10000 INJECTION, SOLUTION INTRAVENOUS at 06:26

## 2023-12-09 RX ADMIN — ASPIRIN 81 MG: 81 TABLET, CHEWABLE ORAL at 09:41

## 2023-12-09 RX ADMIN — SEVELAMER CARBONATE 800 MG: 800 TABLET, FILM COATED ORAL at 09:41

## 2023-12-09 RX ADMIN — SEVELAMER CARBONATE 800 MG: 800 TABLET, FILM COATED ORAL at 17:14

## 2023-12-09 RX ADMIN — OLANZAPINE 5 MG: 5 TABLET, FILM COATED ORAL at 20:54

## 2023-12-09 ASSESSMENT — PAIN SCALES - GENERAL
PAINLEVEL_OUTOF10: 4
PAINLEVEL_OUTOF10: 8

## 2023-12-09 ASSESSMENT — PAIN DESCRIPTION - DESCRIPTORS: DESCRIPTORS: ACHING

## 2023-12-09 ASSESSMENT — PAIN DESCRIPTION - LOCATION: LOCATION: LEG;BACK

## 2023-12-09 ASSESSMENT — PAIN - FUNCTIONAL ASSESSMENT: PAIN_FUNCTIONAL_ASSESSMENT: ACTIVITIES ARE NOT PREVENTED

## 2023-12-09 NOTE — PLAN OF CARE
Problem: Chronic Conditions and Co-morbidities  Goal: Patient's chronic conditions and co-morbidity symptoms are monitored and maintained or improved  12/8/2023 2304 by Narinder John RN  Outcome: Progressing  12/8/2023 1040 by Misbah Roth RN  Outcome: Progressing     Problem: Safety - Adult  Goal: Free from fall injury  12/8/2023 2304 by Narinder John RN  Outcome: Progressing  12/8/2023 1040 by Misbah Roth RN  Outcome: Progressing     Problem: Discharge Planning  Goal: Discharge to home or other facility with appropriate resources  12/8/2023 2304 by Narinder John RN  Outcome: Progressing  12/8/2023 1040 by Misbah Roth RN  Outcome: Progressing     Problem: Skin/Tissue Integrity  Goal: Absence of new skin breakdown  Description: 1. Monitor for areas of redness and/or skin breakdown  2. Assess vascular access sites hourly  3. Every 4-6 hours minimum:  Change oxygen saturation probe site  4. Every 4-6 hours:  If on nasal continuous positive airway pressure, respiratory therapy assess nares and determine need for appliance change or resting period.   12/8/2023 2304 by Narinder John RN  Outcome: Progressing  12/8/2023 1040 by Misbah Roth RN  Outcome: Progressing     Problem: ABCDS Injury Assessment  Goal: Absence of physical injury  12/8/2023 2304 by Narinder John RN  Outcome: Progressing  12/8/2023 1040 by Molly Rincon RN  Outcome: Progressing     Problem: Pain  Goal: Verbalizes/displays adequate comfort level or baseline comfort level  12/8/2023 2304 by Narinder John RN  Outcome: Progressing  Flowsheets (Taken 12/8/2023 1934)  Verbalizes/displays adequate comfort level or baseline comfort level: Encourage patient to monitor pain and request assistance  12/8/2023 1040 by Molly Rincon RN  Outcome: Progressing     Problem: Nutrition Deficit:  Goal: Optimize nutritional status  12/8/2023 2304 by Rosey

## 2023-12-09 NOTE — CARE COORDINATION
Transitional planning: Plan to transfer to [facility] pending bed availability. Possible [facility]NA on Monday.

## 2023-12-10 VITALS
OXYGEN SATURATION: 98 % | RESPIRATION RATE: 27 BRPM | DIASTOLIC BLOOD PRESSURE: 74 MMHG | HEIGHT: 67 IN | HEART RATE: 87 BPM | WEIGHT: 162.7 LBS | SYSTOLIC BLOOD PRESSURE: 102 MMHG | TEMPERATURE: 98 F | BODY MASS INDEX: 25.54 KG/M2

## 2023-12-10 LAB
ANION GAP SERPL CALCULATED.3IONS-SCNC: 16 MMOL/L (ref 9–17)
ANTI-XA UNFRAC HEPARIN: 0.36 IU/L
BUN SERPL-MCNC: 41 MG/DL (ref 6–20)
CALCIUM SERPL-MCNC: 11.1 MG/DL (ref 8.6–10.4)
CHLORIDE SERPL-SCNC: 92 MMOL/L (ref 98–107)
CO2 SERPL-SCNC: 26 MMOL/L (ref 20–31)
CREAT SERPL-MCNC: 4.8 MG/DL (ref 0.7–1.2)
GFR SERPL CREATININE-BSD FRML MDRD: 14 ML/MIN/1.73M2
GLUCOSE BLD-MCNC: 151 MG/DL (ref 75–110)
GLUCOSE SERPL-MCNC: 160 MG/DL (ref 70–99)
POTASSIUM SERPL-SCNC: 4.4 MMOL/L (ref 3.7–5.3)
SODIUM SERPL-SCNC: 134 MMOL/L (ref 135–144)

## 2023-12-10 PROCEDURE — 36415 COLL VENOUS BLD VENIPUNCTURE: CPT

## 2023-12-10 PROCEDURE — 85520 HEPARIN ASSAY: CPT

## 2023-12-10 PROCEDURE — 82947 ASSAY GLUCOSE BLOOD QUANT: CPT

## 2023-12-10 PROCEDURE — 6370000000 HC RX 637 (ALT 250 FOR IP): Performed by: STUDENT IN AN ORGANIZED HEALTH CARE EDUCATION/TRAINING PROGRAM

## 2023-12-10 PROCEDURE — 99239 HOSP IP/OBS DSCHRG MGMT >30: CPT | Performed by: INTERNAL MEDICINE

## 2023-12-10 PROCEDURE — 80048 BASIC METABOLIC PNL TOTAL CA: CPT

## 2023-12-10 PROCEDURE — 2580000003 HC RX 258: Performed by: STUDENT IN AN ORGANIZED HEALTH CARE EDUCATION/TRAINING PROGRAM

## 2023-12-10 RX ADMIN — LANTHANUM CARBONATE 1000 MG: 500 TABLET, CHEWABLE ORAL at 08:29

## 2023-12-10 RX ADMIN — ASPIRIN 81 MG: 81 TABLET, CHEWABLE ORAL at 08:29

## 2023-12-10 RX ADMIN — ISOSORBIDE MONONITRATE 30 MG: 30 TABLET, EXTENDED RELEASE ORAL at 08:29

## 2023-12-10 RX ADMIN — METOPROLOL TARTRATE 25 MG: 25 TABLET ORAL at 08:29

## 2023-12-10 RX ADMIN — Medication 1 TABLET: at 08:29

## 2023-12-10 RX ADMIN — CHLORPROMAZINE HYDROCHLORIDE 25 MG: 25 TABLET, FILM COATED ORAL at 08:29

## 2023-12-10 RX ADMIN — PANTOPRAZOLE SODIUM 40 MG: 40 TABLET, DELAYED RELEASE ORAL at 08:29

## 2023-12-10 RX ADMIN — FERROUS SULFATE TAB EC 325 MG (65 MG FE EQUIVALENT) 325 MG: 325 (65 FE) TABLET DELAYED RESPONSE at 08:29

## 2023-12-10 RX ADMIN — SEVELAMER CARBONATE 800 MG: 800 TABLET, FILM COATED ORAL at 08:29

## 2023-12-10 RX ADMIN — SODIUM CHLORIDE, PRESERVATIVE FREE 10 ML: 5 INJECTION INTRAVENOUS at 08:32

## 2023-12-10 ASSESSMENT — PAIN SCALES - PAIN ASSESSMENT IN ADVANCED DEMENTIA (PAINAD)
CONSOLABILITY: 0
BREATHING: 0
NEGVOCALIZATION: 0
BODYLANGUAGE: 0
TOTALSCORE: 0
FACIALEXPRESSION: 0

## 2023-12-10 NOTE — CARE COORDINATION
Discharge [address] Case Management Department  Written by: Miguel A Sheppard    Patient Name: Christiano Chin  Attending Provider: Roni Lopez MD  Admit Date: 2023 11:42 AM  MRN: [id number]  Account: [de-identified]                     : 1975  Discharge Date: 12/10/23      Disposition: Higher level of care. [facility] bartolome MERAZ.     Miguel A Sheppard

## 2023-12-10 NOTE — CARE COORDINATION
Received call from [address] [place] [address], 5th floor, that [facility] called requesting medical necessity for transfer to [facility] this morning. Andrew with Lisbet at [address] to obtain information to complete medical necessity    [address] necessity and face sheet faxed to [address] Transfer report printed and placed in transport envelope.     0800 Spoke with Fayette Oppenheim in radiology file room. She states she will \"push\" records over to [facility], they no longer provide discs.

## 2023-12-10 NOTE — PLAN OF CARE
Problem: Chronic Conditions and Co-morbidities  Goal: Patient's chronic conditions and co-morbidity symptoms are monitored and maintained or improved  Outcome: Progressing     Problem: Safety - Adult  Goal: Free from fall injury  Outcome: Progressing     Problem: Discharge Planning  Goal: Discharge to home or other facility with appropriate resources  Outcome: Progressing     Problem: Skin/Tissue Integrity  Goal: Absence of new skin breakdown  Description: 1. Monitor for areas of redness and/or skin breakdown  2. Assess vascular access sites hourly  3. Every 4-6 hours minimum:  Change oxygen saturation probe site  4. Every 4-6 hours:  If on nasal continuous positive airway pressure, respiratory therapy assess nares and determine need for appliance change or resting period.   Outcome: Progressing     Problem: ABCDS Injury Assessment  Goal: Absence of physical injury  Outcome: Progressing     Problem: Pain  Goal: Verbalizes/displays adequate comfort level or baseline comfort level  Outcome: Progressing  Flowsheets  Taken 12/9/2023 1606 by Aman Amaya RN  Verbalizes/displays adequate comfort level or baseline comfort level: Encourage patient to monitor pain and request assistance  Taken 12/9/2023 1156 by Aman Amaya RN  Verbalizes/displays adequate comfort level or baseline comfort level: Encourage patient to monitor pain and request assistance     Problem: Nutrition Deficit:  Goal: Optimize nutritional status  Outcome: Progressing

## 2024-02-24 ENCOUNTER — LAB REQUISITION (OUTPATIENT)
Dept: LAB | Facility: HOSPITAL | Age: 49
End: 2024-02-24
Payer: MEDICARE

## 2024-02-24 DIAGNOSIS — E87.5 HYPERKALEMIA: ICD-10-CM

## 2024-02-24 LAB
ANION GAP SERPL CALC-SCNC: 14 MMOL/L (ref 10–20)
BUN SERPL-MCNC: 36 MG/DL (ref 6–23)
CALCIUM SERPL-MCNC: 10.6 MG/DL (ref 8.6–10.3)
CHLORIDE SERPL-SCNC: 105 MMOL/L (ref 98–107)
CO2 SERPL-SCNC: 25 MMOL/L (ref 21–32)
CREAT SERPL-MCNC: 3.67 MG/DL (ref 0.5–1.3)
EGFRCR SERPLBLD CKD-EPI 2021: 19 ML/MIN/1.73M*2
GLUCOSE SERPL-MCNC: 112 MG/DL (ref 74–99)
POTASSIUM SERPL-SCNC: 5.8 MMOL/L (ref 3.5–5.3)
SODIUM SERPL-SCNC: 138 MMOL/L (ref 136–145)

## 2024-02-24 PROCEDURE — 80048 BASIC METABOLIC PNL TOTAL CA: CPT

## 2024-02-28 ENCOUNTER — LAB REQUISITION (OUTPATIENT)
Dept: LAB | Facility: HOSPITAL | Age: 49
End: 2024-02-28
Payer: MEDICARE

## 2024-02-28 DIAGNOSIS — E87.5 HYPERKALEMIA: ICD-10-CM

## 2024-02-28 LAB
ANION GAP SERPL CALC-SCNC: 12 MMOL/L (ref 10–20)
BUN SERPL-MCNC: 20 MG/DL (ref 6–23)
CALCIUM SERPL-MCNC: 9.6 MG/DL (ref 8.6–10.3)
CHLORIDE SERPL-SCNC: 103 MMOL/L (ref 98–107)
CO2 SERPL-SCNC: 26 MMOL/L (ref 21–32)
CREAT SERPL-MCNC: 2.34 MG/DL (ref 0.5–1.3)
EGFRCR SERPLBLD CKD-EPI 2021: 33 ML/MIN/1.73M*2
GLUCOSE SERPL-MCNC: 69 MG/DL (ref 74–99)
POTASSIUM SERPL-SCNC: 4.2 MMOL/L (ref 3.5–5.3)
SODIUM SERPL-SCNC: 137 MMOL/L (ref 136–145)

## 2024-02-28 PROCEDURE — 80048 BASIC METABOLIC PNL TOTAL CA: CPT

## 2024-04-03 ENCOUNTER — HOSPITAL ENCOUNTER (INPATIENT)
Facility: HOSPITAL | Age: 49
LOS: 2 days | Discharge: NURSING, REHAB OR LONG-TERM CARE | DRG: 640 | End: 2024-04-05
Attending: EMERGENCY MEDICINE | Admitting: INTERNAL MEDICINE
Payer: MEDICARE

## 2024-04-03 ENCOUNTER — APPOINTMENT (OUTPATIENT)
Dept: RADIOLOGY | Facility: HOSPITAL | Age: 49
DRG: 640 | End: 2024-04-03
Payer: MEDICARE

## 2024-04-03 ENCOUNTER — APPOINTMENT (OUTPATIENT)
Dept: DIALYSIS | Facility: HOSPITAL | Age: 49
End: 2024-04-03
Payer: MEDICARE

## 2024-04-03 ENCOUNTER — APPOINTMENT (OUTPATIENT)
Dept: CARDIOLOGY | Facility: HOSPITAL | Age: 49
DRG: 640 | End: 2024-04-03
Payer: MEDICARE

## 2024-04-03 DIAGNOSIS — Z86.73 HISTORY OF CVA (CEREBROVASCULAR ACCIDENT): ICD-10-CM

## 2024-04-03 DIAGNOSIS — N18.6 ESRD (END STAGE RENAL DISEASE) (MULTI): ICD-10-CM

## 2024-04-03 DIAGNOSIS — R07.9 CHEST PAIN, UNSPECIFIED TYPE: Primary | ICD-10-CM

## 2024-04-03 DIAGNOSIS — E87.5 HYPERKALEMIA: ICD-10-CM

## 2024-04-03 LAB
ALBUMIN SERPL BCP-MCNC: 3.5 G/DL (ref 3.4–5)
ALBUMIN SERPL BCP-MCNC: 3.5 G/DL (ref 3.4–5)
ALP SERPL-CCNC: 248 U/L (ref 33–120)
ALT SERPL W P-5'-P-CCNC: 37 U/L (ref 10–52)
ANION GAP SERPL CALC-SCNC: 16 MMOL/L (ref 10–20)
ANION GAP SERPL CALC-SCNC: 20 MMOL/L (ref 10–20)
AST SERPL W P-5'-P-CCNC: 53 U/L (ref 9–39)
BASOPHILS # BLD AUTO: 0.03 X10*3/UL (ref 0–0.1)
BASOPHILS NFR BLD AUTO: 0.7 %
BILIRUB SERPL-MCNC: 0.6 MG/DL (ref 0–1.2)
BUN SERPL-MCNC: 22 MG/DL (ref 6–23)
BUN SERPL-MCNC: 46 MG/DL (ref 6–23)
CALCIUM SERPL-MCNC: 10.6 MG/DL (ref 8.6–10.3)
CALCIUM SERPL-MCNC: 8 MG/DL (ref 8.6–10.3)
CARDIAC TROPONIN I PNL SERPL HS: 107 NG/L (ref 0–20)
CARDIAC TROPONIN I PNL SERPL HS: 113 NG/L (ref 0–20)
CARDIAC TROPONIN I PNL SERPL HS: 124 NG/L (ref 0–20)
CHLORIDE SERPL-SCNC: 97 MMOL/L (ref 98–107)
CHLORIDE SERPL-SCNC: 98 MMOL/L (ref 98–107)
CO2 SERPL-SCNC: 23 MMOL/L (ref 21–32)
CO2 SERPL-SCNC: 27 MMOL/L (ref 21–32)
CREAT SERPL-MCNC: 1.99 MG/DL (ref 0.5–1.3)
CREAT SERPL-MCNC: 3.67 MG/DL (ref 0.5–1.3)
EGFRCR SERPLBLD CKD-EPI 2021: 19 ML/MIN/1.73M*2
EGFRCR SERPLBLD CKD-EPI 2021: 40 ML/MIN/1.73M*2
EOSINOPHIL # BLD AUTO: 0.01 X10*3/UL (ref 0–0.7)
EOSINOPHIL NFR BLD AUTO: 0.2 %
ERYTHROCYTE [DISTWIDTH] IN BLOOD BY AUTOMATED COUNT: 16.2 % (ref 11.5–14.5)
GLUCOSE BLD MANUAL STRIP-MCNC: 106 MG/DL (ref 74–99)
GLUCOSE SERPL-MCNC: 128 MG/DL (ref 74–99)
GLUCOSE SERPL-MCNC: 80 MG/DL (ref 74–99)
HCT VFR BLD AUTO: 34.4 % (ref 41–52)
HGB BLD-MCNC: 10.9 G/DL (ref 13.5–17.5)
IMM GRANULOCYTES # BLD AUTO: 0.03 X10*3/UL (ref 0–0.7)
IMM GRANULOCYTES NFR BLD AUTO: 0.7 % (ref 0–0.9)
LYMPHOCYTES # BLD AUTO: 1.16 X10*3/UL (ref 1.2–4.8)
LYMPHOCYTES NFR BLD AUTO: 27.3 %
MCH RBC QN AUTO: 34.9 PG (ref 26–34)
MCHC RBC AUTO-ENTMCNC: 31.7 G/DL (ref 32–36)
MCV RBC AUTO: 110 FL (ref 80–100)
MONOCYTES # BLD AUTO: 0.49 X10*3/UL (ref 0.1–1)
MONOCYTES NFR BLD AUTO: 11.5 %
NEUTROPHILS # BLD AUTO: 2.53 X10*3/UL (ref 1.2–7.7)
NEUTROPHILS NFR BLD AUTO: 59.6 %
NRBC BLD-RTO: 0 /100 WBCS (ref 0–0)
PHOSPHATE SERPL-MCNC: 3.2 MG/DL (ref 2.5–4.9)
PLATELET # BLD AUTO: 231 X10*3/UL (ref 150–450)
POTASSIUM SERPL-SCNC: 4.1 MMOL/L (ref 3.5–5.3)
POTASSIUM SERPL-SCNC: 7.2 MMOL/L (ref 3.5–5.3)
POTASSIUM SERPL-SCNC: 8.2 MMOL/L (ref 3.5–5.3)
PROT SERPL-MCNC: 7.9 G/DL (ref 6.4–8.2)
RBC # BLD AUTO: 3.12 X10*6/UL (ref 4.5–5.9)
SODIUM SERPL-SCNC: 133 MMOL/L (ref 136–145)
SODIUM SERPL-SCNC: 136 MMOL/L (ref 136–145)
WBC # BLD AUTO: 4.3 X10*3/UL (ref 4.4–11.3)

## 2024-04-03 PROCEDURE — 99291 CRITICAL CARE FIRST HOUR: CPT | Performed by: EMERGENCY MEDICINE

## 2024-04-03 PROCEDURE — 36415 COLL VENOUS BLD VENIPUNCTURE: CPT | Performed by: EMERGENCY MEDICINE

## 2024-04-03 PROCEDURE — 84145 PROCALCITONIN (PCT): CPT | Mod: PARLAB

## 2024-04-03 PROCEDURE — 2500000004 HC RX 250 GENERAL PHARMACY W/ HCPCS (ALT 636 FOR OP/ED)

## 2024-04-03 PROCEDURE — 71045 X-RAY EXAM CHEST 1 VIEW: CPT

## 2024-04-03 PROCEDURE — 5A1D70Z PERFORMANCE OF URINARY FILTRATION, INTERMITTENT, LESS THAN 6 HOURS PER DAY: ICD-10-PCS | Performed by: INTERNAL MEDICINE

## 2024-04-03 PROCEDURE — 2500000004 HC RX 250 GENERAL PHARMACY W/ HCPCS (ALT 636 FOR OP/ED): Mod: JZ | Performed by: EMERGENCY MEDICINE

## 2024-04-03 PROCEDURE — 93005 ELECTROCARDIOGRAM TRACING: CPT

## 2024-04-03 PROCEDURE — 2500000005 HC RX 250 GENERAL PHARMACY W/O HCPCS: Performed by: EMERGENCY MEDICINE

## 2024-04-03 PROCEDURE — 71045 X-RAY EXAM CHEST 1 VIEW: CPT | Mod: FOREIGN READ | Performed by: RADIOLOGY

## 2024-04-03 PROCEDURE — 2500000001 HC RX 250 WO HCPCS SELF ADMINISTERED DRUGS (ALT 637 FOR MEDICARE OP)

## 2024-04-03 PROCEDURE — 85025 COMPLETE CBC W/AUTO DIFF WBC: CPT | Performed by: EMERGENCY MEDICINE

## 2024-04-03 PROCEDURE — 84484 ASSAY OF TROPONIN QUANT: CPT | Performed by: EMERGENCY MEDICINE

## 2024-04-03 PROCEDURE — 84132 ASSAY OF SERUM POTASSIUM: CPT | Performed by: EMERGENCY MEDICINE

## 2024-04-03 PROCEDURE — 82947 ASSAY GLUCOSE BLOOD QUANT: CPT

## 2024-04-03 PROCEDURE — 96374 THER/PROPH/DIAG INJ IV PUSH: CPT | Mod: 59

## 2024-04-03 PROCEDURE — 96375 TX/PRO/DX INJ NEW DRUG ADDON: CPT | Mod: 59

## 2024-04-03 PROCEDURE — 94760 N-INVAS EAR/PLS OXIMETRY 1: CPT

## 2024-04-03 PROCEDURE — 99291 CRITICAL CARE FIRST HOUR: CPT

## 2024-04-03 PROCEDURE — 84132 ASSAY OF SERUM POTASSIUM: CPT

## 2024-04-03 PROCEDURE — C9113 INJ PANTOPRAZOLE SODIUM, VIA: HCPCS

## 2024-04-03 PROCEDURE — 2500000002 HC RX 250 W HCPCS SELF ADMINISTERED DRUGS (ALT 637 FOR MEDICARE OP, ALT 636 FOR OP/ED): Performed by: EMERGENCY MEDICINE

## 2024-04-03 PROCEDURE — 1100000001 HC PRIVATE ROOM DAILY

## 2024-04-03 PROCEDURE — 90937 HEMODIALYSIS REPEATED EVAL: CPT

## 2024-04-03 PROCEDURE — 80053 COMPREHEN METABOLIC PANEL: CPT | Performed by: EMERGENCY MEDICINE

## 2024-04-03 PROCEDURE — 84484 ASSAY OF TROPONIN QUANT: CPT

## 2024-04-03 PROCEDURE — 2500000002 HC RX 250 W HCPCS SELF ADMINISTERED DRUGS (ALT 637 FOR MEDICARE OP, ALT 636 FOR OP/ED)

## 2024-04-03 RX ORDER — PANTOPRAZOLE SODIUM 40 MG/10ML
40 INJECTION, POWDER, LYOPHILIZED, FOR SOLUTION INTRAVENOUS DAILY
Status: DISCONTINUED | OUTPATIENT
Start: 2024-04-03 | End: 2024-04-06 | Stop reason: HOSPADM

## 2024-04-03 RX ORDER — BISACODYL 10 MG/1
10 SUPPOSITORY RECTAL DAILY PRN
COMMUNITY
Start: 2024-02-09

## 2024-04-03 RX ORDER — INSULIN LISPRO 100 [IU]/ML
0-5 INJECTION, SOLUTION INTRAVENOUS; SUBCUTANEOUS EVERY 4 HOURS
Status: DISCONTINUED | OUTPATIENT
Start: 2024-04-03 | End: 2024-04-03

## 2024-04-03 RX ORDER — LIDOCAINE AND PRILOCAINE 25; 25 MG/G; MG/G
CREAM TOPICAL
Status: DISCONTINUED | OUTPATIENT
Start: 2024-04-03 | End: 2024-04-06 | Stop reason: HOSPADM

## 2024-04-03 RX ORDER — ONDANSETRON 4 MG/1
8 TABLET, FILM COATED ORAL EVERY 6 HOURS PRN
COMMUNITY

## 2024-04-03 RX ORDER — NAPROXEN SODIUM 220 MG/1
81 TABLET, FILM COATED ORAL DAILY
Status: DISCONTINUED | OUTPATIENT
Start: 2024-04-03 | End: 2024-04-06 | Stop reason: HOSPADM

## 2024-04-03 RX ORDER — ACETAMINOPHEN 160 MG/5ML
650 SOLUTION ORAL EVERY 4 HOURS PRN
Status: DISCONTINUED | OUTPATIENT
Start: 2024-04-03 | End: 2024-04-06 | Stop reason: HOSPADM

## 2024-04-03 RX ORDER — OXYCODONE HYDROCHLORIDE 5 MG/1
5 TABLET ORAL EVERY 6 HOURS PRN
Status: DISCONTINUED | OUTPATIENT
Start: 2024-04-03 | End: 2024-04-06 | Stop reason: HOSPADM

## 2024-04-03 RX ORDER — SERTRALINE HYDROCHLORIDE 50 MG/1
50 TABLET, FILM COATED ORAL NIGHTLY
COMMUNITY
Start: 2024-02-09

## 2024-04-03 RX ORDER — CLOPIDOGREL BISULFATE 75 MG/1
75 TABLET ORAL NIGHTLY
COMMUNITY
Start: 2024-02-09

## 2024-04-03 RX ORDER — DEXTROSE 50 % IN WATER (D50W) INTRAVENOUS SYRINGE
25
Status: DISCONTINUED | OUTPATIENT
Start: 2024-04-03 | End: 2024-04-06 | Stop reason: HOSPADM

## 2024-04-03 RX ORDER — NITROGLYCERIN 0.4 MG/1
0.4 TABLET SUBLINGUAL EVERY 5 MIN PRN
Status: DISCONTINUED | OUTPATIENT
Start: 2024-04-03 | End: 2024-04-06 | Stop reason: HOSPADM

## 2024-04-03 RX ORDER — ACETAMINOPHEN 650 MG/1
650 SUPPOSITORY RECTAL EVERY 4 HOURS PRN
Status: DISCONTINUED | OUTPATIENT
Start: 2024-04-03 | End: 2024-04-06 | Stop reason: HOSPADM

## 2024-04-03 RX ORDER — NAPROXEN SODIUM 220 MG/1
1 TABLET, FILM COATED ORAL DAILY
COMMUNITY

## 2024-04-03 RX ORDER — HEPARIN SODIUM 1000 [USP'U]/ML
1000 INJECTION, SOLUTION INTRAVENOUS; SUBCUTANEOUS
Status: DISCONTINUED | OUTPATIENT
Start: 2024-04-03 | End: 2024-04-06 | Stop reason: HOSPADM

## 2024-04-03 RX ORDER — MELATONIN 3 MG
6 CAPSULE ORAL NIGHTLY
COMMUNITY

## 2024-04-03 RX ORDER — ACETAMINOPHEN 500 MG
1000 TABLET ORAL EVERY 8 HOURS PRN
COMMUNITY
Start: 2024-02-09

## 2024-04-03 RX ORDER — DOCUSATE SODIUM 100 MG/1
100 CAPSULE, LIQUID FILLED ORAL 3 TIMES DAILY
COMMUNITY
Start: 2024-02-09

## 2024-04-03 RX ORDER — HEPARIN SODIUM 1000 [USP'U]/ML
2000 INJECTION, SOLUTION INTRAVENOUS; SUBCUTANEOUS
Status: DISCONTINUED | OUTPATIENT
Start: 2024-04-03 | End: 2024-04-06 | Stop reason: HOSPADM

## 2024-04-03 RX ORDER — METOPROLOL SUCCINATE 50 MG/1
50 TABLET, EXTENDED RELEASE ORAL DAILY
Status: DISCONTINUED | OUTPATIENT
Start: 2024-04-03 | End: 2024-04-06 | Stop reason: HOSPADM

## 2024-04-03 RX ORDER — HEPARIN SODIUM 5000 [USP'U]/ML
5000 INJECTION, SOLUTION INTRAVENOUS; SUBCUTANEOUS EVERY 8 HOURS
Status: DISCONTINUED | OUTPATIENT
Start: 2024-04-03 | End: 2024-04-06 | Stop reason: HOSPADM

## 2024-04-03 RX ORDER — ONDANSETRON HYDROCHLORIDE 2 MG/ML
4 INJECTION, SOLUTION INTRAVENOUS EVERY 8 HOURS PRN
Status: DISCONTINUED | OUTPATIENT
Start: 2024-04-03 | End: 2024-04-03

## 2024-04-03 RX ORDER — GABAPENTIN 100 MG/1
100 CAPSULE ORAL 2 TIMES DAILY
Status: DISCONTINUED | OUTPATIENT
Start: 2024-04-03 | End: 2024-04-06 | Stop reason: HOSPADM

## 2024-04-03 RX ORDER — ATORVASTATIN CALCIUM 40 MG/1
1 TABLET, FILM COATED ORAL DAILY
COMMUNITY
Start: 2022-07-19

## 2024-04-03 RX ORDER — GABAPENTIN 100 MG/1
100 CAPSULE ORAL 2 TIMES DAILY
COMMUNITY
Start: 2024-02-09

## 2024-04-03 RX ORDER — OXYCODONE HYDROCHLORIDE 5 MG/1
5 CAPSULE ORAL EVERY 6 HOURS PRN
COMMUNITY

## 2024-04-03 RX ORDER — ACETAMINOPHEN 325 MG/1
650 TABLET ORAL EVERY 4 HOURS PRN
Status: DISCONTINUED | OUTPATIENT
Start: 2024-04-03 | End: 2024-04-06 | Stop reason: HOSPADM

## 2024-04-03 RX ORDER — DEXTROSE 50 % IN WATER (D50W) INTRAVENOUS SYRINGE
12.5
Status: DISCONTINUED | OUTPATIENT
Start: 2024-04-03 | End: 2024-04-06 | Stop reason: HOSPADM

## 2024-04-03 RX ORDER — POLYETHYLENE GLYCOL 3350 17 G/17G
17 POWDER, FOR SOLUTION ORAL DAILY
Status: DISCONTINUED | OUTPATIENT
Start: 2024-04-03 | End: 2024-04-06 | Stop reason: HOSPADM

## 2024-04-03 RX ORDER — ONDANSETRON HYDROCHLORIDE 2 MG/ML
4 INJECTION, SOLUTION INTRAVENOUS ONCE
Status: COMPLETED | OUTPATIENT
Start: 2024-04-03 | End: 2024-04-03

## 2024-04-03 RX ORDER — MORPHINE SULFATE 4 MG/ML
4 INJECTION, SOLUTION INTRAMUSCULAR; INTRAVENOUS ONCE
Status: COMPLETED | OUTPATIENT
Start: 2024-04-03 | End: 2024-04-03

## 2024-04-03 RX ORDER — INSULIN LISPRO 100 [IU]/ML
0-5 INJECTION, SOLUTION INTRAVENOUS; SUBCUTANEOUS
Status: DISCONTINUED | OUTPATIENT
Start: 2024-04-04 | End: 2024-04-04

## 2024-04-03 RX ORDER — CLOPIDOGREL BISULFATE 75 MG/1
75 TABLET ORAL DAILY
Status: DISCONTINUED | OUTPATIENT
Start: 2024-04-03 | End: 2024-04-06 | Stop reason: HOSPADM

## 2024-04-03 RX ORDER — DEXTROSE 50 % IN WATER (D50W) INTRAVENOUS SYRINGE
25 ONCE
Status: COMPLETED | OUTPATIENT
Start: 2024-04-03 | End: 2024-04-03

## 2024-04-03 RX ORDER — HEPARIN SODIUM 5000 [USP'U]/ML
5000 INJECTION, SOLUTION INTRAVENOUS; SUBCUTANEOUS 2 TIMES DAILY
COMMUNITY
Start: 2024-02-09

## 2024-04-03 RX ORDER — PANTOPRAZOLE SODIUM 40 MG/1
1 TABLET, DELAYED RELEASE ORAL DAILY
COMMUNITY

## 2024-04-03 RX ORDER — SERTRALINE HYDROCHLORIDE 50 MG/1
50 TABLET, FILM COATED ORAL NIGHTLY
Status: DISCONTINUED | OUTPATIENT
Start: 2024-04-03 | End: 2024-04-06 | Stop reason: HOSPADM

## 2024-04-03 RX ORDER — SEVELAMER CARBONATE 800 MG/1
1600 TABLET, FILM COATED ORAL
COMMUNITY
Start: 2023-11-22

## 2024-04-03 RX ORDER — METOPROLOL SUCCINATE 50 MG/1
50 TABLET, EXTENDED RELEASE ORAL DAILY
COMMUNITY

## 2024-04-03 RX ORDER — INDOMETHACIN 25 MG/1
50 CAPSULE ORAL ONCE
Status: COMPLETED | OUTPATIENT
Start: 2024-04-03 | End: 2024-04-03

## 2024-04-03 RX ORDER — ATORVASTATIN CALCIUM 40 MG/1
40 TABLET, FILM COATED ORAL DAILY
Status: DISCONTINUED | OUTPATIENT
Start: 2024-04-03 | End: 2024-04-06 | Stop reason: HOSPADM

## 2024-04-03 RX ORDER — SENNOSIDES 8.6 MG/1
17.2 TABLET ORAL 2 TIMES DAILY
COMMUNITY
Start: 2024-02-09

## 2024-04-03 RX ORDER — POLYETHYLENE GLYCOL 3350 17 G/17G
17 POWDER, FOR SOLUTION ORAL
COMMUNITY
Start: 2024-02-09

## 2024-04-03 RX ORDER — CALCIUM GLUCONATE 20 MG/ML
2 INJECTION, SOLUTION INTRAVENOUS ONCE
Status: COMPLETED | OUTPATIENT
Start: 2024-04-03 | End: 2024-04-03

## 2024-04-03 RX ADMIN — ACETAMINOPHEN 650 MG: 325 TABLET ORAL at 23:05

## 2024-04-03 RX ADMIN — ASPIRIN 81 MG CHEWABLE TABLET 81 MG: 81 TABLET CHEWABLE at 23:05

## 2024-04-03 RX ADMIN — SODIUM BICARBONATE 50 MEQ: 84 INJECTION, SOLUTION INTRAVENOUS at 15:21

## 2024-04-03 RX ADMIN — CLOPIDOGREL BISULFATE 75 MG: 75 TABLET ORAL at 23:05

## 2024-04-03 RX ADMIN — PANTOPRAZOLE SODIUM 40 MG: 40 INJECTION, POWDER, LYOPHILIZED, FOR SOLUTION INTRAVENOUS at 23:05

## 2024-04-03 RX ADMIN — DEXTROSE MONOHYDRATE 25 G: 25 INJECTION, SOLUTION INTRAVENOUS at 15:20

## 2024-04-03 RX ADMIN — MORPHINE SULFATE 4 MG: 4 INJECTION, SOLUTION INTRAMUSCULAR; INTRAVENOUS at 15:18

## 2024-04-03 RX ADMIN — POLYETHYLENE GLYCOL 3350 17 G: 17 POWDER, FOR SOLUTION ORAL at 23:06

## 2024-04-03 RX ADMIN — INSULIN HUMAN 5 UNITS: 100 INJECTION, SOLUTION PARENTERAL at 15:24

## 2024-04-03 RX ADMIN — SERTRALINE HYDROCHLORIDE 50 MG: 50 TABLET ORAL at 23:05

## 2024-04-03 RX ADMIN — HEPARIN SODIUM 5000 UNITS: 5000 INJECTION INTRAVENOUS; SUBCUTANEOUS at 23:05

## 2024-04-03 RX ADMIN — GABAPENTIN 100 MG: 100 CAPSULE ORAL at 23:05

## 2024-04-03 RX ADMIN — ATORVASTATIN CALCIUM 40 MG: 40 TABLET, FILM COATED ORAL at 23:05

## 2024-04-03 RX ADMIN — ONDANSETRON 4 MG: 2 INJECTION INTRAMUSCULAR; INTRAVENOUS at 15:16

## 2024-04-03 RX ADMIN — CALCIUM GLUCONATE 2 G: 20 INJECTION, SOLUTION INTRAVENOUS at 15:25

## 2024-04-03 RX ADMIN — METOPROLOL SUCCINATE 50 MG: 50 TABLET, EXTENDED RELEASE ORAL at 23:05

## 2024-04-03 SDOH — SOCIAL STABILITY: SOCIAL INSECURITY: HAVE YOU HAD THOUGHTS OF HARMING ANYONE ELSE?: NO

## 2024-04-03 SDOH — SOCIAL STABILITY: SOCIAL INSECURITY: ARE YOU OR HAVE YOU BEEN THREATENED OR ABUSED PHYSICALLY, EMOTIONALLY, OR SEXUALLY BY ANYONE?: NO

## 2024-04-03 SDOH — SOCIAL STABILITY: SOCIAL INSECURITY: WERE YOU ABLE TO COMPLETE ALL THE BEHAVIORAL HEALTH SCREENINGS?: YES

## 2024-04-03 SDOH — SOCIAL STABILITY: SOCIAL INSECURITY: ARE THERE ANY APPARENT SIGNS OF INJURIES/BEHAVIORS THAT COULD BE RELATED TO ABUSE/NEGLECT?: NO

## 2024-04-03 SDOH — SOCIAL STABILITY: SOCIAL INSECURITY: DO YOU FEEL UNSAFE GOING BACK TO THE PLACE WHERE YOU ARE LIVING?: NO

## 2024-04-03 SDOH — SOCIAL STABILITY: SOCIAL INSECURITY: ABUSE: ADULT

## 2024-04-03 SDOH — SOCIAL STABILITY: SOCIAL INSECURITY: DO YOU FEEL ANYONE HAS EXPLOITED OR TAKEN ADVANTAGE OF YOU FINANCIALLY OR OF YOUR PERSONAL PROPERTY?: NO

## 2024-04-03 SDOH — SOCIAL STABILITY: SOCIAL INSECURITY: DOES ANYONE TRY TO KEEP YOU FROM HAVING/CONTACTING OTHER FRIENDS OR DOING THINGS OUTSIDE YOUR HOME?: NO

## 2024-04-03 SDOH — SOCIAL STABILITY: SOCIAL INSECURITY: HAS ANYONE EVER THREATENED TO HURT YOUR FAMILY OR YOUR PETS?: NO

## 2024-04-03 ASSESSMENT — COGNITIVE AND FUNCTIONAL STATUS - GENERAL
MOBILITY SCORE: 10
STANDING UP FROM CHAIR USING ARMS: A LOT
WALKING IN HOSPITAL ROOM: A LOT
HELP NEEDED FOR BATHING: A LOT
TURNING FROM BACK TO SIDE WHILE IN FLAT BAD: A LITTLE
WALKING IN HOSPITAL ROOM: TOTAL
DAILY ACTIVITIY SCORE: 16
MOVING TO AND FROM BED TO CHAIR: A LITTLE
DAILY ACTIVITIY SCORE: 22
MOVING FROM LYING ON BACK TO SITTING ON SIDE OF FLAT BED WITH BEDRAILS: A LITTLE
CLIMB 3 TO 5 STEPS WITH RAILING: TOTAL
HELP NEEDED FOR BATHING: A LITTLE
TOILETING: TOTAL
MOBILITY SCORE: 15
MOVING TO AND FROM BED TO CHAIR: A LOT
DRESSING REGULAR UPPER BODY CLOTHING: A LOT
CLIMB 3 TO 5 STEPS WITH RAILING: TOTAL
STANDING UP FROM CHAIR USING ARMS: TOTAL
TURNING FROM BACK TO SIDE WHILE IN FLAT BAD: A LOT
DRESSING REGULAR LOWER BODY CLOTHING: A LITTLE
PATIENT BASELINE BEDBOUND: NO
TOILETING: A LITTLE

## 2024-04-03 ASSESSMENT — ACTIVITIES OF DAILY LIVING (ADL)
GROOMING: INDEPENDENT
BATHING: NEEDS ASSISTANCE
HEARING - RIGHT EAR: FUNCTIONAL
LACK_OF_TRANSPORTATION: NO
TOILETING: NEEDS ASSISTANCE
ADEQUATE_TO_COMPLETE_ADL: YES
PATIENT'S MEMORY ADEQUATE TO SAFELY COMPLETE DAILY ACTIVITIES?: YES
WALKS IN HOME: NEEDS ASSISTANCE
JUDGMENT_ADEQUATE_SAFELY_COMPLETE_DAILY_ACTIVITIES: YES
HEARING - LEFT EAR: FUNCTIONAL
FEEDING YOURSELF: INDEPENDENT
ASSISTIVE_DEVICE: WHEELCHAIR
DRESSING YOURSELF: NEEDS ASSISTANCE

## 2024-04-03 ASSESSMENT — PAIN DESCRIPTION - PROGRESSION: CLINICAL_PROGRESSION: NOT CHANGED

## 2024-04-03 ASSESSMENT — PAIN SCALES - GENERAL
PAINLEVEL_OUTOF10: 0 - NO PAIN
PAINLEVEL_OUTOF10: 8
PAINLEVEL_OUTOF10: 2

## 2024-04-03 ASSESSMENT — COLUMBIA-SUICIDE SEVERITY RATING SCALE - C-SSRS
2. HAVE YOU ACTUALLY HAD ANY THOUGHTS OF KILLING YOURSELF?: NO
1. IN THE PAST MONTH, HAVE YOU WISHED YOU WERE DEAD OR WISHED YOU COULD GO TO SLEEP AND NOT WAKE UP?: NO
6. HAVE YOU EVER DONE ANYTHING, STARTED TO DO ANYTHING, OR PREPARED TO DO ANYTHING TO END YOUR LIFE?: NO

## 2024-04-03 ASSESSMENT — PAIN DESCRIPTION - DESCRIPTORS: DESCRIPTORS: DULL

## 2024-04-03 ASSESSMENT — LIFESTYLE VARIABLES
HOW OFTEN DO YOU HAVE 6 OR MORE DRINKS ON ONE OCCASION: NEVER
AUDIT-C TOTAL SCORE: 0
EVER HAD A DRINK FIRST THING IN THE MORNING TO STEADY YOUR NERVES TO GET RID OF A HANGOVER: NO
AUDIT-C TOTAL SCORE: 0
SKIP TO QUESTIONS 9-10: 1
SUBSTANCE_ABUSE_PAST_12_MONTHS: NO
HOW MANY STANDARD DRINKS CONTAINING ALCOHOL DO YOU HAVE ON A TYPICAL DAY: PATIENT DOES NOT DRINK
HAVE PEOPLE ANNOYED YOU BY CRITICIZING YOUR DRINKING: NO
PRESCIPTION_ABUSE_PAST_12_MONTHS: NO
HAVE YOU EVER FELT YOU SHOULD CUT DOWN ON YOUR DRINKING: NO
HOW OFTEN DO YOU HAVE A DRINK CONTAINING ALCOHOL: NEVER
TOTAL SCORE: 0
EVER FELT BAD OR GUILTY ABOUT YOUR DRINKING: NO

## 2024-04-03 ASSESSMENT — PAIN - FUNCTIONAL ASSESSMENT
PAIN_FUNCTIONAL_ASSESSMENT: 0-10
PAIN_FUNCTIONAL_ASSESSMENT: NO/DENIES PAIN
PAIN_FUNCTIONAL_ASSESSMENT: 0-10
PAIN_FUNCTIONAL_ASSESSMENT: 0-10

## 2024-04-03 ASSESSMENT — PAIN DESCRIPTION - FREQUENCY: FREQUENCY: CONSTANT/CONTINUOUS

## 2024-04-03 ASSESSMENT — PAIN DESCRIPTION - ORIENTATION: ORIENTATION: LEFT;ANTERIOR

## 2024-04-03 ASSESSMENT — PATIENT HEALTH QUESTIONNAIRE - PHQ9
2. FEELING DOWN, DEPRESSED OR HOPELESS: NOT AT ALL
1. LITTLE INTEREST OR PLEASURE IN DOING THINGS: NOT AT ALL
SUM OF ALL RESPONSES TO PHQ9 QUESTIONS 1 & 2: 0

## 2024-04-03 ASSESSMENT — PAIN DESCRIPTION - ONSET: ONSET: ONGOING

## 2024-04-03 ASSESSMENT — PAIN DESCRIPTION - LOCATION: LOCATION: CHEST

## 2024-04-03 ASSESSMENT — PAIN DESCRIPTION - PAIN TYPE: TYPE: ACUTE PAIN

## 2024-04-03 NOTE — PROGRESS NOTES
This TCC attempted to meet with patient.  Patient with member of care team, receiving dialysis.  Unable to complete discharge planning assessment.  Care Transitions will continue to follow.  Mar Feldman RN BSN, TCC

## 2024-04-03 NOTE — PRE-PROCEDURE NOTE
Report from Sending RN:    Report From: Armida  Recent Surgery of Procedure: No  Baseline Level of Consciousness (LOC): A&Ox2-3  Oxygen Use: No  Type: room air   Diabetic: Yes  Last BP Med Given Day of Dialysis: see MAR   Last Pain Med Given: see MAR   Lab Tests to be Obtained with Dialysis: No  Blood Transfusion to be Given During Dialysis: No  Available IV Access: Yes  Medications to be Administered During Dialysis: No  Continuous IV Infusion Running: No  Restraints on Currently or in the Last 24 Hours: No  Hand-Off Communication: patient to be in 134 for dialysis  stat tx for hyperkalemia   Dialysis Catheter Dressing: n/a   Last Dressing Change: n/a

## 2024-04-03 NOTE — ED PROVIDER NOTES
HPI   No chief complaint on file.      48-year-old male who presents via squad for chest pain.  Patient was on his way to dialysis when he started developing midsternal chest pain and they brought him here to [place].  He is dialysis Monday Wednesday Friday.  He has had a recent stroke.  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  Patient is complaining of tightness in his chest.  Denies any shortness of breath.                          Yumiko Coma Scale Score: 14                     Patient History   No past medical history on file.  No past surgical history on file.  No family history on file.  Social History     Tobacco Use    Smoking status: Not on file    Smokeless tobacco: Not on file   Substance Use Topics    Alcohol use: Not on file    Drug use: Not on file       Physical Exam   ED Triage Vitals   Temp Pulse Resp BP   -- -- -- --      SpO2 Temp src Heart Rate Source Patient Position   -- -- -- --      BP Location FiO2 (%)     -- --       Physical Exam  Constitutional:       Appearance: Normal appearance. He is normal weight.   HENT:      Head: Normocephalic and atraumatic.      Nose: Nose normal.      Mouth/Throat:      Mouth: Mucous membranes are moist.      Pharynx: Oropharynx is clear.   Eyes:      Extraocular Movements: Extraocular movements intact.      Conjunctiva/sclera: Conjunctivae normal.      Pupils: Pupils are equal, round, and reactive to light.   Cardiovascular:      Rate and Rhythm: Normal rate and regular rhythm.   Pulmonary:      Effort: Pulmonary effort is normal.      Breath sounds: Normal breath sounds.   Abdominal:      General: Abdomen is flat. Bowel sounds are normal.      Palpations: Abdomen is soft.   Musculoskeletal:      Cervical back: Normal range of motion and neck supple.      Comments: Left AKA   Skin:     General: Skin is warm and dry.      Capillary Refill: Capillary refill takes less than 2 seconds.   Neurological:      General: No focal deficit present.      Mental Status: He is alert.    Psychiatric:         Mood and Affect: Mood normal.         Behavior: Behavior normal.         Thought Content: Thought content normal.         Judgment: Judgment normal.       Labs Reviewed   CBC WITH AUTO DIFFERENTIAL - Abnormal       Result Value    WBC 4.3 (*)     nRBC 0.0      RBC 3.12 (*)     Hemoglobin 10.9 (*)     Hematocrit 34.4 (*)      (*)     MCH 34.9 (*)     MCHC 31.7 (*)     RDW 16.2 (*)     Platelets 231      Neutrophils % 59.6      Immature Granulocytes %, Automated 0.7      Lymphocytes % 27.3      Monocytes % 11.5      Eosinophils % 0.2      Basophils % 0.7      Neutrophils Absolute 2.53      Immature Granulocytes Absolute, Automated 0.03      Lymphocytes Absolute 1.16 (*)     Monocytes Absolute 0.49      Eosinophils Absolute 0.01      Basophils Absolute 0.03     COMPREHENSIVE METABOLIC PANEL - Abnormal    Glucose 128 (*)     Sodium 133 (*)     Potassium 8.2 (*)     Chloride 98      Bicarbonate 23      Anion Gap 20      Urea Nitrogen 46 (*)     Creatinine 3.67 (*)     eGFR 19 (*)     Calcium 10.6 (*)     Albumin 3.5      Alkaline Phosphatase 248 (*)     Total Protein 7.9      AST 53 (*)     Bilirubin, Total 0.6      ALT 37     SERIAL TROPONIN-INITIAL - Abnormal    Troponin I, High Sensitivity 107 (*)     Narrative:     Less than 99th percentile of normal range cutoff-  Female and children under 18 years old <14 ng/L; Male <21 ng/L: Negative  Repeat testing should be performed if clinically indicated.     Female and children under 18 years old 14-50 ng/L; Male 21-50 ng/L:  Consistent with possible cardiac damage and possible increased clinical   risk. Serial measurements may help to assess extent of myocardial damage.     >50 ng/L: Consistent with cardiac damage, increased clinical risk and  myocardial infarction. Serial measurements may help assess extent of   myocardial damage.      NOTE: Children less than 1 year old may have higher baseline troponin   levels and results should be  interpreted in conjunction with the overall   clinical context.     NOTE: Troponin I testing is performed using a different   testing methodology at [facility] than at other   [facility] hospitals. Direct result comparisons should only   be made within the same method.   TROPONIN SERIES- (INITIAL, 1 HR)    Narrative:     The following orders were created for panel order Troponin I Series, High Sensitivity (0, 1 HR).  Procedure                               Abnormality         Status                     ---------                               -----------         ------                     Troponin I, High Sensiti...[[id number]]  Abnormal            Final result               Troponin, High Sensitivi...[[id number]]                                                   Please view results for these tests on the individual orders.   SERIAL TROPONIN, 1 HOUR   POTASSIUM   POCT GLUCOSE METER       XR chest 1 view   Final Result   1.Stable mild enlargement of the cardiac silhouette.   2.Moderate persistent opacity in the right lung base, likely an   area of consolidation with associated pleural effusion, similar to   prior. The left lung remains clear.   Signed by Eric Kaur MD            ED Course & MDM   Diagnoses as of 04/03/24 6336   Chest pain, unspecified type   ESRD (end stage renal disease) (CMS/[facility])   Hyperkalemia   History of CVA (cerebrovascular accident)       Medical Decision Making  Emergency department course, EKG showed a normal sinus rhythm at a rate of 85 with nonspecific changes.  Potassium is elevated at 8.1.  Patient's he did not have dialysis today it is mildly hemolyzed will repeat and treat.  BUN of 46 with a creatinine of 3.67.  High-sensitivity troponin is 107.  Chest x-ray showed a stable mild enlargement of the cardiac silhouette moderate persistent opacity in the right lung base.  I do have nephrology on page to get the patient emergent dialysis.  I did discuss the case with Dr. Melendez who  will put in the dialysis orders.  I will also discussed it with ICU Dr. Del Valle since patient will have to go the ICU for dialysis.  Patient will be admitted to Dr. Higuera's service.    Amount and/or Complexity of Data Reviewed  ECG/medicine tests: independent interpretation performed.     Details: EKG shows a normal sinus rhythm at a rate of 85 with nonspecific ST-T wave changes, interpreted by ED physician.        Procedure  Critical Care    Performed by: Desiree Hou DO  Authorized by: Desiree Hou DO    Critical care provider statement:     Critical care time (minutes):  40    Critical care time was exclusive of:  Separately billable procedures and treating other patients    Critical care was time spent personally by me on the following activities:  Discussions with consultants, discussions with primary provider, pulse oximetry, ordering and review of radiographic studies, ordering and review of laboratory studies and re-evaluation of patient's condition    Care discussed with: admitting provider         Desiree Hou DO  04/03/24 1536

## 2024-04-03 NOTE — PROGRESS NOTES
History of Present Illness:  Brianna Rodas is a 48 y.o. male with a past medical history of CAD s/p CABG X3 in July 2023, HFrEF (EF 20 to 25%), CVA with residual right-sided weakness (2023), ESRD on HD (MWF), left AKA, hypertension, hyperlipidemia, diabetes who presented while he was on his way to dialysis and began developing midsternal chest pain so he was diverted to [place] ED instead.  He reports some nausea associated with the chest pain earlier.  Chest pain is nonpositional and not radiating, patient denies any diaphoresis or any other associated symptoms.  In the ED, patient was found to have troponin 107 then 113, potassium 8.2 then 7.2.    Of note, patient has had a CABG and CVA in 2023 at [facility].    Past medical history: As above  Past surgical history: Left AKA  Past social history: Former smoker (quit in 2023), denies alcohol or drug use  Past family history: Noncontributory    Review of systems:  10 point review of systems performed and negative except as stated above.    Physical Exam:  General:  Pleasant and cooperative. No apparent distress.  HEENT:  Normocephalic, atraumatic, mucus membranes moist.   Neck:  Trachea midline.  No JVD.    Chest:  Clear to auscultation bilaterally. No wheezes, rales, or rhonchi.  CV:  Regular rate and rhythm.  Positive S1/S2. No murmur, no gallops, no rubs  Abdomen: Bowel sounds present in all four quadrants, abdomen is soft, non-tender, non-distended.  Extremities:  No lower extremity edema or cyanosis. L AKA  Neurological:  AAOx3. No focal deficits.  Skin:  Warm and dry.    Daily progress:       Assessment + Plan:   Patient is a 48-year-old male with a past medical history of CAD s/p CABG X3 in July 2023, HFrEF (EF 20 to 25%), CVA with residual right-sided weakness (2023), ESRD on HD (MWF), left AKA, hypertension, hyperlipidemia, diabetes who presented while he was on his way to dialysis and began developing midsternal chest pain so he was diverted to [place] ED  instead.  He reports some nausea associated with the chest pain.  Troponin was found to be 107 then 113, potassium was 8.2 then 7.2.  Patient was admitted to the ICU for dialysis.    Neuro:  No acute abnormalities    CV:  #Chest pain  #Elevated troponin  #HFrEF (EF 20 to 25%)  #CAD s/p CABG x3 (2023)  -Trend troponin  -Cardiology consulted  -Sublingual nitro as needed  -Continue home Plavix, aspirin, statin, metoprolol      Respiratory:  No acute abnormalities    GI:  Continue home PPI    Endo:  SSI    Renal:  #Hyperkalemia  #ESRD on HD  -Dialysis today per nephrology.  Left arm fistula  -Repeat morning BMP    Hematological:  No acute abnormalities    ID:  No acute abnormalities    Vent/O2: Room air  Lines/Devices: PIV  Drips: None  ATBx: None  Fluids: None  Diet: Regular, n.p.o. at midnight  PPX: Heparin subcu, Protonix  Code status: Full code  Dispo: ICU

## 2024-04-03 NOTE — ED TRIAGE NOTES
Patient from nursing home was going to be picked up for dialysis. Prior to getting picked up patient began to have chest pain and ems was called to bring patient to this ED

## 2024-04-04 LAB
ALBUMIN SERPL BCP-MCNC: 3.2 G/DL (ref 3.4–5)
ANION GAP SERPL CALC-SCNC: 16 MMOL/L (ref 10–20)
BUN SERPL-MCNC: 28 MG/DL (ref 6–23)
CALCIUM SERPL-MCNC: 9.7 MG/DL (ref 8.6–10.3)
CARDIAC TROPONIN I PNL SERPL HS: 126 NG/L (ref 0–20)
CHLORIDE SERPL-SCNC: 98 MMOL/L (ref 98–107)
CO2 SERPL-SCNC: 27 MMOL/L (ref 21–32)
CREAT SERPL-MCNC: 2.44 MG/DL (ref 0.5–1.3)
EGFRCR SERPLBLD CKD-EPI 2021: 31 ML/MIN/1.73M*2
ERYTHROCYTE [DISTWIDTH] IN BLOOD BY AUTOMATED COUNT: 15.9 % (ref 11.5–14.5)
GLUCOSE BLD MANUAL STRIP-MCNC: 131 MG/DL (ref 74–99)
GLUCOSE BLD MANUAL STRIP-MCNC: 59 MG/DL (ref 74–99)
GLUCOSE BLD MANUAL STRIP-MCNC: 70 MG/DL (ref 74–99)
GLUCOSE BLD MANUAL STRIP-MCNC: 95 MG/DL (ref 74–99)
GLUCOSE SERPL-MCNC: 72 MG/DL (ref 74–99)
HCT VFR BLD AUTO: 33.5 % (ref 41–52)
HGB BLD-MCNC: 10.7 G/DL (ref 13.5–17.5)
MAGNESIUM SERPL-MCNC: 2.02 MG/DL (ref 1.6–2.4)
MCH RBC QN AUTO: 34.7 PG (ref 26–34)
MCHC RBC AUTO-ENTMCNC: 31.9 G/DL (ref 32–36)
MCV RBC AUTO: 109 FL (ref 80–100)
NRBC BLD-RTO: 0 /100 WBCS (ref 0–0)
PHOSPHATE SERPL-MCNC: 4.5 MG/DL (ref 2.5–4.9)
PLATELET # BLD AUTO: 203 X10*3/UL (ref 150–450)
POTASSIUM SERPL-SCNC: 4.9 MMOL/L (ref 3.5–5.3)
PROCALCITONIN SERPL-MCNC: 0.47 NG/ML
RBC # BLD AUTO: 3.08 X10*6/UL (ref 4.5–5.9)
SODIUM SERPL-SCNC: 136 MMOL/L (ref 136–145)
WBC # BLD AUTO: 3.7 X10*3/UL (ref 4.4–11.3)

## 2024-04-04 PROCEDURE — 82947 ASSAY GLUCOSE BLOOD QUANT: CPT

## 2024-04-04 PROCEDURE — 99232 SBSQ HOSP IP/OBS MODERATE 35: CPT

## 2024-04-04 PROCEDURE — 36415 COLL VENOUS BLD VENIPUNCTURE: CPT

## 2024-04-04 PROCEDURE — 2500000002 HC RX 250 W HCPCS SELF ADMINISTERED DRUGS (ALT 637 FOR MEDICARE OP, ALT 636 FOR OP/ED)

## 2024-04-04 PROCEDURE — 84484 ASSAY OF TROPONIN QUANT: CPT

## 2024-04-04 PROCEDURE — C9113 INJ PANTOPRAZOLE SODIUM, VIA: HCPCS

## 2024-04-04 PROCEDURE — 1100000001 HC PRIVATE ROOM DAILY

## 2024-04-04 PROCEDURE — 85027 COMPLETE CBC AUTOMATED: CPT

## 2024-04-04 PROCEDURE — 83735 ASSAY OF MAGNESIUM: CPT

## 2024-04-04 PROCEDURE — 2500000001 HC RX 250 WO HCPCS SELF ADMINISTERED DRUGS (ALT 637 FOR MEDICARE OP)

## 2024-04-04 PROCEDURE — 2500000004 HC RX 250 GENERAL PHARMACY W/ HCPCS (ALT 636 FOR OP/ED)

## 2024-04-04 PROCEDURE — 80069 RENAL FUNCTION PANEL: CPT

## 2024-04-04 PROCEDURE — 2500000005 HC RX 250 GENERAL PHARMACY W/O HCPCS

## 2024-04-04 RX ORDER — PANTOPRAZOLE SODIUM 40 MG/1
40 TABLET, DELAYED RELEASE ORAL
Start: 2024-04-05

## 2024-04-04 RX ORDER — INSULIN LISPRO 100 [IU]/ML
0-5 INJECTION, SOLUTION INTRAVENOUS; SUBCUTANEOUS
Status: DISCONTINUED | OUTPATIENT
Start: 2024-04-04 | End: 2024-04-06 | Stop reason: HOSPADM

## 2024-04-04 RX ORDER — PANTOPRAZOLE SODIUM 40 MG/1
40 TABLET, DELAYED RELEASE ORAL
Status: DISCONTINUED | OUTPATIENT
Start: 2024-04-05 | End: 2024-04-06 | Stop reason: HOSPADM

## 2024-04-04 RX ADMIN — PANTOPRAZOLE SODIUM 40 MG: 40 INJECTION, POWDER, LYOPHILIZED, FOR SOLUTION INTRAVENOUS at 10:00

## 2024-04-04 RX ADMIN — SERTRALINE HYDROCHLORIDE 50 MG: 50 TABLET ORAL at 22:34

## 2024-04-04 RX ADMIN — DEXTROSE MONOHYDRATE 12.5 G: 25 INJECTION, SOLUTION INTRAVENOUS at 12:21

## 2024-04-04 RX ADMIN — DEXTROSE MONOHYDRATE 12.5 G: 25 INJECTION, SOLUTION INTRAVENOUS at 08:00

## 2024-04-04 RX ADMIN — HEPARIN SODIUM 5000 UNITS: 5000 INJECTION INTRAVENOUS; SUBCUTANEOUS at 06:39

## 2024-04-04 RX ADMIN — HEPARIN SODIUM 5000 UNITS: 5000 INJECTION INTRAVENOUS; SUBCUTANEOUS at 18:58

## 2024-04-04 RX ADMIN — ACETAMINOPHEN 650 MG: 325 TABLET ORAL at 22:34

## 2024-04-04 RX ADMIN — GABAPENTIN 100 MG: 100 CAPSULE ORAL at 22:34

## 2024-04-04 RX ADMIN — HEPARIN SODIUM 5000 UNITS: 5000 INJECTION INTRAVENOUS; SUBCUTANEOUS at 11:04

## 2024-04-04 RX ADMIN — OXYCODONE HYDROCHLORIDE 5 MG: 5 TABLET ORAL at 22:48

## 2024-04-04 ASSESSMENT — PAIN SCALES - GENERAL
PAINLEVEL_OUTOF10: 7
PAINLEVEL_OUTOF10: 5 - MODERATE PAIN
PAINLEVEL_OUTOF10: 3
PAINLEVEL_OUTOF10: 7
PAINLEVEL_OUTOF10: 0 - NO PAIN
PAINLEVEL_OUTOF10: 3
PAINLEVEL_OUTOF10: 0 - NO PAIN

## 2024-04-04 ASSESSMENT — PAIN - FUNCTIONAL ASSESSMENT
PAIN_FUNCTIONAL_ASSESSMENT: 0-10

## 2024-04-04 ASSESSMENT — COGNITIVE AND FUNCTIONAL STATUS - GENERAL
DRESSING REGULAR LOWER BODY CLOTHING: TOTAL
TURNING FROM BACK TO SIDE WHILE IN FLAT BAD: A LOT
MOVING TO AND FROM BED TO CHAIR: TOTAL
WALKING IN HOSPITAL ROOM: TOTAL
CLIMB 3 TO 5 STEPS WITH RAILING: TOTAL
DAILY ACTIVITIY SCORE: 8
PERSONAL GROOMING: TOTAL
MOBILITY SCORE: 8
EATING MEALS: A LITTLE
DRESSING REGULAR UPPER BODY CLOTHING: TOTAL
TOILETING: TOTAL
HELP NEEDED FOR BATHING: TOTAL
MOVING FROM LYING ON BACK TO SITTING ON SIDE OF FLAT BED WITH BEDRAILS: A LOT
STANDING UP FROM CHAIR USING ARMS: TOTAL

## 2024-04-04 ASSESSMENT — PAIN DESCRIPTION - DESCRIPTORS
DESCRIPTORS: ACHING;DULL
DESCRIPTORS: ACHING
DESCRIPTORS: ACHING

## 2024-04-04 ASSESSMENT — PAIN DESCRIPTION - LOCATION: LOCATION: BACK

## 2024-04-04 NOTE — H&P
History Of Present Illness  Brianna Rodas is a 48 y.o. male presenting with chest pain on the way to dialysis.  Patient says there is no radiation of pain.  Patient also relates a history of some reproducible pain on palpation.  Patient did miss dialysis today and has significant hyperkalemia and will need emergency dialysis in ICU today..     Past Medical History  He has no past medical history on file.    Surgical History  He has no past surgical history on file.     Social History  He has no history on file for tobacco use, alcohol use, and drug use.    Family History  No family history on file.     Allergies  Clonidine    Review of Systems     Physical Exam     Last Recorded Vitals  /68   Pulse 74   Temp 35.7 °C (96.3 °F)   Resp 12   Wt 69 kg (152 lb 1.9 oz)   SpO2 98%     Relevant Results             Assessment/Plan   Principal Problem:    Chest pain, unspecified type        Date of Service: 4/3/2024  3:00 PM  Consult Orders   Inpatient consult to Intensivist [[id number]] ordered by Sander Coy DO at 04/03/24 1503          Attested         History of Present Illness:  Brianna Rodas is a 48 y.o. male with a past medical history of CAD s/p CABG X3 in July 2023, HFrEF (EF 20 to 25%), CVA with residual right-sided weakness (2023), ESRD on HD ([facility]), left AKA, hypertension, hyperlipidemia, diabetes who presented while he was on his way to dialysis and began developing midsternal chest pain so he was diverted to [place] ED instead.  He reports some nausea associated with the chest pain earlier.  Chest pain is nonpositional and not radiating, patient denies any diaphoresis or any other associated symptoms.  In the ED, patient was found to have troponin 107 then 113, potassium 8.2 then 7.2.    Of note, patient has had a CABG and CVA in 2023 at [facility].     Past medical history: As above  Past surgical history: Left AKA  Past social history: Former smoker (quit in 2023), denies alcohol or drug  use  Past family history: Noncontributory     Review of systems:  10 point review of systems performed and negative except as stated above.     Physical Exam:  General:  Pleasant and cooperative. No apparent distress.  HEENT:  Normocephalic, atraumatic, mucus membranes moist.   Neck:  Trachea midline.  No JVD.    Chest:  Clear to auscultation bilaterally. No wheezes, rales, or rhonchi.  CV:  Regular rate and rhythm.  Positive S1/S2. No murmur, no gallops, no rubs  Abdomen: Bowel sounds present in all four quadrants, abdomen is soft, non-tender, non-distended.  Extremities:  No lower extremity edema or cyanosis. L AKA  Neurological:  AAOx3. No focal deficits.  Skin:  Warm and dry.     Daily progress:         Assessment + Plan:   Patient is a 48-year-old male with a past medical history of CAD s/p CABG X3 in July 2023, HFrEF (EF 20 to 25%), CVA with residual right-sided weakness (2023), ESRD on HD ([facility]), left AKA, hypertension, hyperlipidemia, diabetes who presented while he was on his way to dialysis and began developing midsternal chest pain so he was diverted to [place] ED instead.  He reports some nausea associated with the chest pain.  Troponin was found to be 107 then 113, potassium was 8.2 then 7.2.  Patient was admitted to the ICU for dialysis.     Neuro:  No acute abnormalities     CV:  #Chest pain  #Elevated troponin  #HFrEF (EF 20 to 25%)  #CAD s/p CABG x3 (2023)  -Trend troponin  -Cardiology consulted  -Sublingual nitro as needed  -Continue home Plavix, aspirin, statin, metoprolol        Respiratory:  No acute abnormalities     GI:  Continue home PPI     Endo:  SSI     Renal:  #Hyperkalemia  #ESRD on HD  -Dialysis today per nephrology.  Left arm fistula  -Repeat morning BMP     Hematological:  No acute abnormalities     ID:  No acute abnormalities     Vent/O2: Room air  Lines/Devices: PIV  Drips: None  ATBx: None  Fluids: None  Diet: Regular, n.p.o. at midnight  PPX: Heparin subcu, Protonix  Code status:  Full code  Dispo: ICU          Attestation signed by Leilani Del Valle MD at 4/4/2024  8:34 AM     Pulmonary and Critical Care Attending Attestation:      Briefly this is a 49 yo M With PMHx of CAD s/p CABG, HFrEF, ESRD on HD presenting to [facility] ED with CP. Pt was admitted to the MICU for HyperK with need for emergent HD.        #ESRD on HD with HyperK   - Nephro Consult   - HD tonight   - Follow up RFP      #CAD s/p CABG with Hx of HFrEF   #Chest Pain   - Cards Consult   - Trend trops   - Continue BB, Plavix, Statin, ASA      Rest per residents note.      ICU CHECK LIST:   Antimicrobials: -   Oxygen: RA   Drips: -   Feeding/Fluids: ADAT   Analgesia: -  Sedation: -  Thromboprophylaxis: SCDs and SQH   Ulcer prophylaxis: PPI   Glycemic control: BGM with SSI   Bowel care: PRN   Indwelling catheters:   Lines: PIVs   Restraints: -  Dispo: MICU   Code Status: FC      I                   Cosigned by:      Revision History    Emergency dialysis planned for today.  Recheck labs in AM.  Cardiac workup in progress with cardiology consultation.  Nephrology consulted for dialysis.           Christiano Higuera MD

## 2024-04-04 NOTE — CONSULTS
History of Present Illness:  Brianna Rodas is a 48 y.o. male with a past medical history of CAD s/p CABG X3 in July 2023, HFrEF (EF 20 to 25%), CVA with residual right-sided weakness (2023), ESRD on HD (MWF), left AKA, hypertension, hyperlipidemia, diabetes who presented while he was on his way to dialysis and began developing midsternal chest pain so he was diverted to [place] ED instead.  He reports some nausea associated with the chest pain earlier.  Chest pain is nonpositional and not radiating, patient denies any diaphoresis or any other associated symptoms.  In the ED, patient was found to have troponin 107 then 113, potassium 8.2 then 7.2.    Of note, patient has had a CABG and CVA in 2023 at [facility].     Past medical history: As above  Past surgical history: Left AKA  Past social history: Former smoker (quit in 2023), denies alcohol or drug use  Past family history: Noncontributory     Review of systems:  10 point review of systems performed and negative except as stated above.     Physical Exam:  General:  Pleasant and cooperative. No apparent distress.  HEENT:  Normocephalic, atraumatic, mucus membranes moist.   Neck:  Trachea midline.  No JVD.    Chest:  Clear to auscultation bilaterally. No wheezes, rales, or rhonchi.  CV:  Regular rate and rhythm.  Positive S1/S2. No murmur, no gallops, no rubs  Abdomen: Bowel sounds present in all four quadrants, abdomen is soft, non-tender, non-distended.  Extremities:  No lower extremity edema or cyanosis. L AKA  Neurological:  AAOx3. No focal deficits.  Skin:  Warm and dry.     Daily progress:         Assessment + Plan:   Patient is a 48-year-old male with a past medical history of CAD s/p CABG X3 in July 2023, HFrEF (EF 20 to 25%), CVA with residual right-sided weakness (2023), ESRD on HD (MWF), left AKA, hypertension, hyperlipidemia, diabetes who presented while he was on his way to dialysis and began developing midsternal chest pain so he was diverted to [place] ED  instead.  He reports some nausea associated with the chest pain.  Troponin was found to be 107 then 113, potassium was 8.2 then 7.2.  Patient was admitted to the ICU for dialysis.     Neuro:  No acute abnormalities     CV:  #Chest pain  #Elevated troponin  #HFrEF (EF 20 to 25%)  #CAD s/p CABG x3 (2023)  -Trend troponin  -Cardiology consulted  -Sublingual nitro as needed  -Continue home Plavix, aspirin, statin, metoprolol        Respiratory:  No acute abnormalities     GI:  Continue home PPI     Endo:  SSI     Renal:  #Hyperkalemia  #ESRD on HD  -Dialysis today per nephrology.  Left arm fistula  -Repeat morning BMP     Hematological:  No acute abnormalities     ID:  No acute abnormalities     Vent/O2: Room air  Lines/Devices: PIV  Drips: None  ATBx: None  Fluids: None  Diet: Regular, n.p.o. at midnight  PPX: Heparin subcu, Protonix  Code status: Full code  Dispo: ICU

## 2024-04-04 NOTE — CONSULTS
Cardiology Consult    Impression:  Chest pain.  No evidence for ACS.  Life-threatening hyperkalemia  CAD.  History of CABG.  PCI left main for every 2024 at [facility].  Ischemic cardiomyopathy.  Echo January 2024 showing EF 35%.  HFrEF  End-stage renal disease.  Dialysis dependent.  Anemia  Hypertension  Hyperlipidemia  Diabetes  History of stroke with residual hemiparesis  Plan:  Medical therapy for CAD: Continue dual antiplatelet therapy statin and beta-blocker  Fluid and electrolyte management per nephrology.  GDMT limited by end-stage renal disease/hyperkalemia.  No additional inpatient cardiac workup required.  CC  Chest pain  HPI:  48-year-old man transferred to ER from nursing home for evaluation of chest pain and hyperkalemia.  Patient has a complex medical history.  He was transferred from [facility] early January for second opinion regarding redo CABG.  EF the time was 10 to 15%.  After extensive workup and consultation with interventional cardiology and cardiothoracic surgery he underwent Impella assisted PCI to the left main.  EF improved to 35%.  Ultimately transferred to a nursing home.  He been doing okay in the nursing home till he was brought in with chest pain.  EKG showed sinus rhythm with a left bundle branch block and diffuse nonspecific ST-T wave abnormalities.  Chest pain settled very quickly.  Troponins have been mildly elevated 102, 124 with a very flat response.  He has had no recurrence of chest pain since hospitalization.  He has been seen by nephrology and dialyzed.  Meds:  Scheduled medications  aspirin, 81 mg, oral, Daily  atorvastatin, 40 mg, oral, Daily  clopidogrel, 75 mg, oral, Daily  gabapentin, 100 mg, oral, BID  heparin (porcine), 5,000 Units, subcutaneous, q8h  heparin, 1,000 Units, intravenous, After Dialysis  heparin, 2,000 Units, intravenous, After Dialysis  insulin lispro, 0-5 Units, subcutaneous, TID with meals  lidocaine-prilocaine, , Topical, After  Dialysis  metoprolol succinate XL, 50 mg, oral, Daily  pantoprazole, 40 mg, intravenous, Daily  polyethylene glycol, 17 g, oral, Daily  sertraline, 50 mg, oral, Nightly      Continuous medications     PRN medications  PRN medications: acetaminophen **OR** acetaminophen **OR** acetaminophen, dextrose, dextrose, glucagon, glucagon, nitroglycerin, oxyCODONE    PMHx:  As listed above  Social history:  Currently in a nursing home  Family history:  Noncontributory  Review of systems:  See HPI  Physical exam:  Vitals:    04/04/24 0800   BP:    Pulse:    Resp:    Temp: 36 °C (96.8 °F)   SpO2:       JVP not elevated. Carotid upstroke normal. No carotid bruits. Heart sounds normal. No extra sounds or murmurs. Chest clear. Abdomen soft, nontender. No masses. Peripheral pulses palpable. No edema.  EKG:  Sinus rhythm.  IVCD.  Nonspecific ST-T abnormalities.  Echo:  January 2024: EF 35%.  Labs:  Lab Results   Component Value Date    WBC 3.7 (L) 04/04/2024    HGB 10.7 (L) 04/04/2024    HCT 33.5 (L) 04/04/2024     04/04/2024    ALT 37 04/03/2024    AST 53 (H) 04/03/2024     04/04/2024    K 4.9 04/04/2024    CL 98 04/04/2024    CREATININE 2.44 (H) 04/04/2024    BUN 28 (H) 04/04/2024    CO2 27 04/04/2024    INR 1.1 10/28/2019    HGBA1C 5.8 (H) 12/10/2023     par

## 2024-04-04 NOTE — PROGRESS NOTES
04/04/24 1107   Discharge Planning   Living Arrangements Other (Comment)  (Currently at [facility].)   Support Systems Spouse/significant other;Children;Friends/neighbors  (son lives in [place].)   Assistance Needed Needs assistance with ADLs; wheelchair   Type of Residence Skilled nursing facility   Home or Post Acute Services Post acute facilities (Rehab/SNF/etc)   Type of Post Acute Facility Services Skilled nursing   Patient [facility]   Does the patient need discharge transport arranged? Yes   RoundTrip coordination needed? Yes   Has discharge transport been arranged? No     This TCC met with patient at bedside, introduced self and explained role.  Patient deferred assessment to spouse/emergency contact, Tyrell Schumacher. Demographic information and insurance verified.  Sp[facility] stated [facility] is [facility] And was there for skilled care.  Spouse denies SW needs at this time.  Spouse stated, the plan is for patient to return to the [facility] at discharge.  Referral sent to the [facility].  Messaged provider for PT/OT eval order.  Patient will need transportation arranged.  Care Transitions will continue to follow.  Mar Feldman RN BSN, TCC

## 2024-04-04 NOTE — POST-PROCEDURE NOTE
Report to Receiving RN:    Report To: Kristen   Time Report Called: 2132  Hand-Off Communication: removed 3 Liters no issues during dialysis; tolerated well   Complications During Treatment: No  Ultrafiltration Treatment: Yes  Medications Administered During Dialysis: No  Blood Products Administered During Dialysis: No  Labs Sent During Dialysis: Yes  Heparin Drip Rate Changes: N/A  Dialysis Catheter Dressing: N?A  Last Dressing Change: N?A     Electronic Signatures:  AE (Signed ZOE Serrano )       Last Updated: 9:32 PM by DILLON BLACKMON

## 2024-04-04 NOTE — CONSULTS
NEPHROLOGY CONSULTATION NOTE    DATE OF CONSULTATION:  04/04/24     Referring Physician: Christiano Higuera MD    REASON FOR CONSULT: ESRD on HD MWF with hyperkalemia    HISTORY OF PRESENT ILLNESS:   Brianna Rodas is a 48 y.o. male presenting with chest pain from the nursing facility.  Patient was apparently on his way to dialysis when he complained of chest pain and EMS was called and patient was brought into the ED.  He had some nausea associated with it.  The pain was nonradiating.  He had a troponin of 107.  Potassium was significantly elevated at 8.2 with mild hemolysis.  Repeat potassium came back at 7.2.  He was transferred to the ICU.  He was emergently dialyzed 1K bath for the first hour and then 2K bath.  Repeat potassium improved.  He has been feeling better.  He is slow to respond to.  Denies any pain or shortness of breath.    He is not able to clearly tell me as to how long he has been on dialysis but thinks that it is more than 3 years.  He has longstanding history of diabetes.  He has heart failure with low EF.  Patient had developed and its significant sepsis mediated LALY back in 2019 October when he was initiated on dialysis.  He never recovered from that episode.  He had underlying CKD from diabetes.       PAST MEDICAL HISTORY:   1.  ESRD on HD MWF  2.  Coronary artery disease  3.  History of stroke with right-sided hemiparesis  4.  History of heart failure with low EF 15%  5.  Protein calorie malnutrition  6.  Decubitus ulcer in this coccygeal area  7.  Diabetes mellitus type 2  8.  Peripheral arterial disease  9.  Right IJ DVT  10.  SMA calcification  11.  Anemia in CKD    SURGICAL HISTORY:   1.  Left arm AV fistula  2.  Coronary artery bypass graft  3.  Stenting of the LAD  4.  Left AKA January 25, 2024  5.  Fistulogram with dilation of the AV fistula  6.  Permacath placement and subsequent removal    SOCIAL HISTORY:   He is a nursing home resident.  He was at home with his wife.    FAMILY  HISTORY:  States that his mother is on dialysis.  There is family history of kidney disease.    ALLERGIES:  Clonidine    MEDICATIONS:     No current facility-administered medications on file prior to encounter.     Current Outpatient Medications on File Prior to Encounter   Medication Sig Dispense Refill    acetaminophen (Tylenol) 500 mg tablet Take 2 tablets (1,000 mg) by mouth every 8 hours if needed for moderate pain (4 - 6).      atorvastatin (Lipitor) 40 mg tablet Take 1 tablet (40 mg) by mouth once daily.      B complex-vitamin C-folic acid (Nephrocaps) 1 mg capsule Take 1 capsule by mouth once daily at bedtime.      bisacodyl (Dulcolax) 10 mg suppository Insert 1 suppository (10 mg) into the rectum once daily as needed for constipation.      clopidogrel (Plavix) 75 mg tablet Take 1 tablet (75 mg) by mouth once daily at bedtime.      docusate sodium (Colace) 100 mg capsule Take 1 capsule (100 mg) by mouth 3 times a day.      gabapentin (Neurontin) 100 mg capsule Take 1 capsule (100 mg) by mouth twice a day.      heparin sodium,porcine (heparin, porcine,) 5,000 unit/mL injection Inject 1 mL (5,000 Units) under the skin twice a day.      polyethylene glycol (Glycolax, Miralax) 17 gram packet Take 17 g by mouth once daily.      sennosides (Senokot) 8.6 mg tablet Take 2 tablets (17.2 mg) by mouth twice a day.      sertraline (Zoloft) 50 mg tablet Take 1 tablet (50 mg) by mouth once daily at bedtime.      sevelamer carbonate (Renvela) 800 mg tablet Take 2 tablets (1,600 mg) by mouth 3 times a day with meals.      aspirin 81 mg chewable tablet Chew 1 tablet (81 mg) once daily.      melatonin 3 mg capsule Take 6 mg by mouth once daily at bedtime.      metoprolol succinate XL (Toprol-XL) 50 mg 24 hr tablet Take 1 tablet (50 mg) by mouth once daily. Do not crush or chew.      ondansetron (Zofran) 4 mg tablet Take 2 tablets (8 mg) by mouth every 6 hours if needed for nausea.      oxyCODONE (Oxy-IR) 5 mg immediate  release capsule Take 1 capsule (5 mg) by mouth every 6 hours if needed for severe pain (7 - 10).      pantoprazole (ProtoNix) 40 mg EC tablet Take 1 tablet (40 mg) by mouth once daily.      sodium phosphates (Fleet, Pediatric,) 9.5-3.5 gram/59 mL enema Insert into the rectum once daily as needed for constipation.      sodium zirconium cyclosilicate (Lokelma) 10 gram packet Take 10 g by mouth once daily. Give on non dialysis days: Tuesdays, Thursdays, Saturdays, and Sundays          Scheduled medications  aspirin, 81 mg, oral, Daily  atorvastatin, 40 mg, oral, Daily  clopidogrel, 75 mg, oral, Daily  gabapentin, 100 mg, oral, BID  heparin (porcine), 5,000 Units, subcutaneous, q8h  heparin, 1,000 Units, intravenous, After Dialysis  heparin, 2,000 Units, intravenous, After Dialysis  lidocaine-prilocaine, , Topical, After Dialysis  metoprolol succinate XL, 50 mg, oral, Daily  pantoprazole, 40 mg, intravenous, Daily  polyethylene glycol, 17 g, oral, Daily  sertraline, 50 mg, oral, Nightly      Continuous medications     PRN medications  PRN medications: acetaminophen **OR** acetaminophen **OR** acetaminophen, dextrose, dextrose, glucagon, glucagon, nitroglycerin, oxyCODONE     REVIEW OF SYSTEMS:    No headache, no fever or chills, had chest pain, no shortness of breath, feels weak, had nausea, no vomiting, no diarrhea, no constipation , right-sided focal weakness, no burning urination, no leg swelling. Rest of the 10 point ROS is negative     PHYSICAL EXAM:    Visit Vitals  /73   Pulse 69   Temp 36.1 °C (97 °F)   Resp 12        GENERAL: Awake and Alert, slow to respond to, in no distress, Cooperative  EYES: EOMI,  Pallor noted  HEENT: oral mucosa moist  NECK: supple  CHEST: no tachypnea, no crackles, no wheeze  CVS: S1, S2 heard, sternotomy scar, regular Rate and Rhythm, no murmurs, no rubs  ABDOMEN: soft, non tender, bowel sounds present, no distention  MSK: No joint effusion, no tenderness  NEURO: Right-sided  weakness, moving all extremities, no tremor  EXT: Right lower extremity leg edema, left AKA, left arm AV fistula with good bruit and thrill  PSYCH: normal affect         I&O 24HR    Intake/Output Summary (Last 24 hours) at 4/4/2024 0848  Last data filed at 4/3/2024 2129  Gross per 24 hour   Intake 1100 ml   Output 3400 ml   Net -2300 ml       RESULTS:         Lab Results   Component Value Date    WBC 3.7 (L) 04/04/2024    HGB 10.7 (L) 04/04/2024    HCT 33.5 (L) 04/04/2024     (H) 04/04/2024     04/04/2024      Lab Results   Component Value Date    GLUCOSE 72 (L) 04/04/2024    CALCIUM 9.7 04/04/2024     04/04/2024    K 4.9 04/04/2024    CO2 27 04/04/2024    CL 98 04/04/2024    BUN 28 (H) 04/04/2024    CREATININE 2.44 (H) 04/04/2024         === 04/03/24 ===    XR CHEST 1 VIEW    - Impression -  1.Stable mild enlargement of the cardiac silhouette.  2.Moderate persistent opacity in the right lung base, likely an  area of consolidation with associated pleural effusion, similar to  prior. The left lung remains clear.  Signed by Eric Kaur MD     ASSESSMENT/ PLAN:     This is a 48 y.o. year old male who presents with chest pain    1.  ESRD on HD MWF: Patient has a left arm AV fistula.  He is getting dialyzed at the [facility].  He dialyzes for 3-1/2 hours.  We dialyzed him yesterday with a 2K bath initial 1 hour was with a 1K bath.  Potassium level has improved to 4.9.  He had 3 L fluid removed.  He still has some peripheral edema.    2.  Hyperkalemia: Initial potassium was 8.4 but it was mildly hemolyzed and a repeat was 7.2.  Postdialysis potassium came down to 4.1 and today it is at 4.9.    3.  Fluid overload: He does have some peripheral edema.  He has heart failure with low EF.  Ejection fraction was around 15%.  Has history of coronary artery disease.  He had 3 L fluid removed with dialysis yesterday.    4.  Anemia in ESRD: Current hemoglobin of 10.7.  Will monitor.      Thank you very much for  allowing me to participate in the care of this patient.     This note was created using dragon dictation and may contain unintended errors    YOBANI HELTON MD    04/04/24

## 2024-04-04 NOTE — PROGRESS NOTES
Subjective:  Patient denies any chest pain overnight, troponins plateaued.  Pending cardiology evaluation.  Patient stable for medical floors, ICU to sign off today.        Physical Exam:  General:  Pleasant and cooperative. No apparent distress.  HEENT:  Normocephalic, atraumatic, mucus membranes moist.   Neck:  Trachea midline.  No JVD.    Chest:  Clear to auscultation bilaterally. No wheezes, rales, or rhonchi.  CV:  Regular rate and rhythm.  Positive S1/S2. No murmur, no gallops, no rubs  Abdomen: Bowel sounds present in all four quadrants, abdomen is soft, non-tender, non-distended.  Extremities:  No lower extremity edema or cyanosis. L AKA  Neurological:  AAOx3. No focal deficits.  Skin:  Warm and dry.     Daily progress:  4/4: Patient denies any chest pain.  Troponins peaked at 126.  Pending cardiology evaluation.  Patient stable for transfer to floors, ICU to sign off today.        Assessment + Plan:   Patient is a 48-year-old male with a past medical history of CAD s/p CABG X3 in July 2023, HFrEF (EF 20 to 25%), CVA with residual right-sided weakness (2023), ESRD on HD ([facility]), left AKA, hypertension, hyperlipidemia, diabetes who presented while he was on his way to dialysis and began developing midsternal chest pain so he was diverted to [facility] ED instead.  He reports some nausea associated with the chest pain.  Troponin was found to be 107 then 113, potassium was 8.2 then 7.2.  Patient was admitted to the ICU for dialysis.     Neuro:  No acute abnormalities     CV:  #Chest pain  #Elevated troponin  #HFrEF (EF 20 to 25%)  #CAD s/p CABG x3 (2023)  -Trend troponin  -Cardiology consulted  -Sublingual nitro as needed  -Continue home Plavix, aspirin, statin, metoprolol        Respiratory:  No acute abnormalities     GI:  Continue home PPI     Endo:  SSI     Renal:  #Hyperkalemia  #ESRD on HD  -Dialysis today per nephrology.  Left arm fistula     Hematological:  No acute abnormalities     ID:  No acute  abnormalities     Vent/O2: Room air  Lines/Devices: PIV  Drips: None  ATBx: None  Fluids: None  Diet: Renal diet  PPX: Heparin subcu, Protonix  Code status: Full code  Dispo: ICU

## 2024-04-04 NOTE — DISCHARGE SUMMARY
Discharge Diagnosis  Chest pain, unspecified type    Issues Requiring Follow-Up  Patient fully evaluated on April 4 and no longer has chest pain.  Patient has been cleared by cardiology and cleared for discharge today.  Electrolytes have improved and corrected by nephrology.    Discharge Meds     Your medication list        CHANGE how you take these medications        Instructions Last Dose Given Next Dose Due   pantoprazole 40 mg EC tablet  Commonly known as: ProtoNix  What changed: Another medication with the same name was added. Make sure you understand how and when to take each.           pantoprazole 40 mg EC tablet  Commonly known as: ProtoNix  Start taking on: April 5, 2024  What changed: You were already taking a medication with the same name, and this prescription was added. Make sure you understand how and when to take each.      Take 1 tablet (40 mg) by mouth once daily in the morning. Take before meals. Do not crush, chew, or split. Do not start before April 5, 2024.              CONTINUE taking these medications        Instructions Last Dose Given Next Dose Due   acetaminophen 500 mg tablet  Commonly known as: Tylenol           aspirin 81 mg chewable tablet           atorvastatin 40 mg tablet  Commonly known as: Lipitor           B complex-vitamin C-folic acid 1 mg capsule  Commonly known as: Nephrocaps           bisacodyl 10 mg suppository  Commonly known as: Dulcolax           clopidogrel 75 mg tablet  Commonly known as: Plavix           docusate sodium 100 mg capsule  Commonly known as: Colace           gabapentin 100 mg capsule  Commonly known as: Neurontin           heparin (porcine) 5,000 unit/mL injection           Lokelma 10 gram packet  Generic drug: sodium zirconium cyclosilicate           melatonin 3 mg capsule           metoprolol succinate XL 50 mg 24 hr tablet  Commonly known as: Toprol-XL           ondansetron 4 mg tablet  Commonly known as: Zofran           oxyCODONE 5 mg immediate  release capsule  Commonly known as: Oxy-IR           polyethylene glycol 17 gram packet  Commonly known as: Glycolax, Miralax           sennosides 8.6 mg tablet  Commonly known as: Senokot           sertraline 50 mg tablet  Commonly known as: Zoloft           sevelamer carbonate 800 mg tablet  Commonly known as: Renvela           sodium phosphates 9.5-3.5 gram/59 mL enema  Commonly known as: Fleet (Pediatric)                     Where to Get Your Medications        Information about where to get these medications is not yet available    Ask your nurse or doctor about these medications  pantoprazole 40 mg EC tablet         Test Results Pending At Discharge  Pending Labs       No current pending labs.                   Date of Service: 4/3/2024  3:00 PM  Consult Orders   Inpatient consult to Intensivist [[id number]] ordered by Sander Coy DO at 04/03/24 1503          Attested         History of Present Illness:  Brianna Rodas is a 48 y.o. male with a past medical history of CAD s/p CABG X3 in July 2023, HFrEF (EF 20 to 25%), CVA with residual right-sided weakness (2023), ESRD on HD ([facility]), left AKA, hypertension, hyperlipidemia, diabetes who presented while he was on his way to dialysis and began developing midsternal chest pain so he was diverted to [facility] ED instead.  He reports some nausea associated with the chest pain earlier.  Chest pain is nonpositional and not radiating, patient denies any diaphoresis or any other associated symptoms.  In the ED, patient was found to have troponin 107 then 113, potassium 8.2 then 7.2.    Of note, patient has had a CABG and CVA in 2023 at [facility].     Past medical history: As above  Past surgical history: Left AKA  Past social history: Former smoker (quit in 2023), denies alcohol or drug use  Past family history: Noncontributory     Review of systems:  10 point review of systems performed and negative except as stated above.     Physical Exam:  General:  Pleasant and  cooperative. No apparent distress.  HEENT:  Normocephalic, atraumatic, mucus membranes moist.   Neck:  Trachea midline.  No JVD.    Chest:  Clear to auscultation bilaterally. No wheezes, rales, or rhonchi.  CV:  Regular rate and rhythm.  Positive S1/S2. No murmur, no gallops, no rubs  Abdomen: Bowel sounds present in all four quadrants, abdomen is soft, non-tender, non-distended.  Extremities:  No lower extremity edema or cyanosis. L AKA  Neurological:  AAOx3. No focal deficits.  Skin:  Warm and dry.     Daily progress:         Assessment + Plan:   Patient is a 48-year-old male with a past medical history of CAD s/p CABG X3 in July 2023, HFrEF (EF 20 to 25%), CVA with residual right-sided weakness (2023), ESRD on HD ([facility]), left AKA, hypertension, hyperlipidemia, diabetes who presented while he was on his way to dialysis and began developing midsternal chest pain so he was diverted to [place] ED instead.  He reports some nausea associated with the chest pain.  Troponin was found to be 107 then 113, potassium was 8.2 then 7.2.  Patient was admitted to the ICU for dialysis.     Neuro:  No acute abnormalities     CV:  #Chest pain  #Elevated troponin  #HFrEF (EF 20 to 25%)  #CAD s/p CABG x3 (2023)  -Trend troponin  -Cardiology consulted  -Sublingual nitro as needed  -Continue home Plavix, aspirin, statin, metoprolol        Respiratory:  No acute abnormalities     GI:  Continue home PPI     Endo:  SSI     Renal:  #Hyperkalemia  #ESRD on HD  -Dialysis today per nephrology.  Left arm fistula  -Repeat morning BMP     Hematological:  No acute abnormalities     ID:  No acute abnormalities     Vent/O2: Room air  Lines/Devices: PIV  Drips: None  ATBx: None  Fluids: None  Diet: Regular, n.p.o. at midnight  PPX: Heparin subcu, Protonix  Code status: Full code  Dispo: ICU          Attestation signed by Leilani Del Valle MD at 4/4/2024  8:34 AM     Pulmonary and Critical Care Attending Attestation:      Briefly this is a 49 yo M With  PMHx of CAD s/p CABG, HFrEF, ESRD on HD presenting to [facility] ED with CP. Pt was admitted to the MICU for HyperK with need for emergent HD.        #ESRD on HD with HyperK   - Nephro Consult   - HD tonight   - Follow up RFP      #CAD s/p CABG with Hx of HFrEF   #Chest Pain   - Cards Consult   - Trend trops   - Continue BB, Plavix, Statin, ASA      Rest per residents note.      ICU CHECK LIST:   Antimicrobials: -   Oxygen: RA   Drips: -   Feeding/Fluids: ADAT   Analgesia: -  Sedation: -  Thromboprophylaxis: SCDs and SQH   Ulcer prophylaxis: PPI   Glycemic control: BGM with SSI   Bowel care: PRN   Indwelling catheters:   Lines: PIVs   Restraints: -  Dispo: MICU   Code Status: FC                        Cosigned by:      Revision History         Pertinent Physical Exam At Time of Discharge  Physical Exam    Outpatient Follow-Up  No future appointments.      Christiano Higuera MD

## 2024-04-05 ENCOUNTER — APPOINTMENT (OUTPATIENT)
Dept: DIALYSIS | Facility: HOSPITAL | Age: 49
End: 2024-04-05
Payer: MEDICARE

## 2024-04-05 VITALS
WEIGHT: 127.5 LBS | BODY MASS INDEX: 20.01 KG/M2 | SYSTOLIC BLOOD PRESSURE: 147 MMHG | TEMPERATURE: 96.8 F | RESPIRATION RATE: 20 BRPM | HEART RATE: 80 BPM | OXYGEN SATURATION: 98 % | HEIGHT: 67 IN | DIASTOLIC BLOOD PRESSURE: 80 MMHG

## 2024-04-05 LAB
GLUCOSE BLD MANUAL STRIP-MCNC: 100 MG/DL (ref 74–99)
GLUCOSE BLD MANUAL STRIP-MCNC: 118 MG/DL (ref 74–99)
GLUCOSE BLD MANUAL STRIP-MCNC: 139 MG/DL (ref 74–99)
GLUCOSE BLD MANUAL STRIP-MCNC: 180 MG/DL (ref 74–99)

## 2024-04-05 PROCEDURE — 82947 ASSAY GLUCOSE BLOOD QUANT: CPT

## 2024-04-05 PROCEDURE — 8010000001 HC DIALYSIS - HEMODIALYSIS PER DAY

## 2024-04-05 PROCEDURE — 2500000002 HC RX 250 W HCPCS SELF ADMINISTERED DRUGS (ALT 637 FOR MEDICARE OP, ALT 636 FOR OP/ED)

## 2024-04-05 PROCEDURE — 2500000001 HC RX 250 WO HCPCS SELF ADMINISTERED DRUGS (ALT 637 FOR MEDICARE OP)

## 2024-04-05 PROCEDURE — 2500000001 HC RX 250 WO HCPCS SELF ADMINISTERED DRUGS (ALT 637 FOR MEDICARE OP): Performed by: INTERNAL MEDICINE

## 2024-04-05 PROCEDURE — 2500000004 HC RX 250 GENERAL PHARMACY W/ HCPCS (ALT 636 FOR OP/ED)

## 2024-04-05 RX ADMIN — CLOPIDOGREL BISULFATE 75 MG: 75 TABLET ORAL at 14:36

## 2024-04-05 RX ADMIN — ATORVASTATIN CALCIUM 40 MG: 40 TABLET, FILM COATED ORAL at 14:36

## 2024-04-05 RX ADMIN — METOPROLOL SUCCINATE 50 MG: 50 TABLET, EXTENDED RELEASE ORAL at 14:36

## 2024-04-05 RX ADMIN — HEPARIN SODIUM 5000 UNITS: 5000 INJECTION INTRAVENOUS; SUBCUTANEOUS at 05:48

## 2024-04-05 RX ADMIN — GABAPENTIN 100 MG: 100 CAPSULE ORAL at 21:44

## 2024-04-05 RX ADMIN — OXYCODONE HYDROCHLORIDE 5 MG: 5 TABLET ORAL at 11:20

## 2024-04-05 RX ADMIN — PANTOPRAZOLE SODIUM 40 MG: 40 TABLET, DELAYED RELEASE ORAL at 06:01

## 2024-04-05 RX ADMIN — HEPARIN SODIUM 2000 UNITS: 1000 INJECTION INTRAVENOUS; SUBCUTANEOUS at 11:16

## 2024-04-05 RX ADMIN — HEPARIN SODIUM 5000 UNITS: 5000 INJECTION INTRAVENOUS; SUBCUTANEOUS at 21:44

## 2024-04-05 RX ADMIN — HEPARIN SODIUM 5000 UNITS: 5000 INJECTION INTRAVENOUS; SUBCUTANEOUS at 14:37

## 2024-04-05 RX ADMIN — GABAPENTIN 100 MG: 100 CAPSULE ORAL at 14:36

## 2024-04-05 RX ADMIN — ASPIRIN 81 MG CHEWABLE TABLET 81 MG: 81 TABLET CHEWABLE at 14:36

## 2024-04-05 RX ADMIN — SERTRALINE HYDROCHLORIDE 50 MG: 50 TABLET ORAL at 20:08

## 2024-04-05 RX ADMIN — POLYETHYLENE GLYCOL 3350 17 G: 17 POWDER, FOR SOLUTION ORAL at 14:36

## 2024-04-05 ASSESSMENT — COGNITIVE AND FUNCTIONAL STATUS - GENERAL
HELP NEEDED FOR BATHING: TOTAL
EATING MEALS: A LITTLE
DRESSING REGULAR LOWER BODY CLOTHING: TOTAL
TOILETING: TOTAL
MOVING TO AND FROM BED TO CHAIR: TOTAL
DAILY ACTIVITIY SCORE: 8
DRESSING REGULAR UPPER BODY CLOTHING: TOTAL
STANDING UP FROM CHAIR USING ARMS: TOTAL
TURNING FROM BACK TO SIDE WHILE IN FLAT BAD: A LOT
CLIMB 3 TO 5 STEPS WITH RAILING: TOTAL
MOVING FROM LYING ON BACK TO SITTING ON SIDE OF FLAT BED WITH BEDRAILS: A LOT
MOBILITY SCORE: 8
PERSONAL GROOMING: TOTAL
WALKING IN HOSPITAL ROOM: TOTAL

## 2024-04-05 ASSESSMENT — PAIN - FUNCTIONAL ASSESSMENT
PAIN_FUNCTIONAL_ASSESSMENT: 0-10

## 2024-04-05 ASSESSMENT — PAIN SCALES - GENERAL
PAINLEVEL_OUTOF10: 4
PAINLEVEL_OUTOF10: 0 - NO PAIN
PAINLEVEL_OUTOF10: 8
PAINLEVEL_OUTOF10: 8

## 2024-04-05 ASSESSMENT — PAIN DESCRIPTION - LOCATION: LOCATION: BACK

## 2024-04-05 ASSESSMENT — PAIN DESCRIPTION - DESCRIPTORS: DESCRIPTORS: ACHING

## 2024-04-05 ASSESSMENT — PAIN DESCRIPTION - ORIENTATION: ORIENTATION: LOWER

## 2024-04-05 NOTE — NURSING NOTE
Report to Receiving RN:    Report To: [id number]  Time Report Called: 14:14  Hand-Off Communication: tx completed; 2 liters of fluid remove  Complications During Treatment: No  Ultrafiltration Treatment: No  Medications Administered During Dialysis: No  Blood Products Administered During Dialysis: No  Labs Sent During Dialysis: No  Heparin Drip Rate Changes: No  Dialysis Catheter Dressing: N/A  Last Dressing Change: N/A    Electronic Signatures:  Simran Spears RN (Signed CE)     Last Updated: 2:12 PM by SIMRAN SPEARS

## 2024-04-05 NOTE — PROGRESS NOTES
Occupational Therapy                 Therapy Communication Note    Patient Name: Brianna Rodas  MRN: [id number]  Today's Date: 4/5/2024     Discipline: Occupational Therapy    Missed Visit Reason: Missed Visit Reason:  (Attempted initial OT eval at 10:06 am and also this pm however patient undergoing dialysis treatment.  Updated RN/Shobha and MICHAEL/Dyan.)    Missed Time: Attempt

## 2024-04-05 NOTE — PROGRESS NOTES
Physical Therapy                 Therapy Communication Note    Patient Name: Brianna Rodas  MRN: [id number]  Today's Date: 4/5/2024     Discipline: Physical Therapy    Missed Visit Reason: Missed Visit Reason: Patient in a medical procedure (dialysis)

## 2024-04-05 NOTE — CARE PLAN
The patient's goals for the shift include  comfort.    The clinical goals for the shift include Safety and comfort    Over the shift, the patient did make progress toward the following goals. Barriers to progression include dietary non-compliance.  Recommendations to address these barriers include ongoing instruction on diet plan, supportive care, dialysis per order.

## 2024-04-05 NOTE — PROGRESS NOTES
NEPHROLOGY PROGRESS NOTE    REASON FOR CONSULT: ESRD on HD MWF    SUBJECTIVE:  Patient is slow to respond to but complains of back pain and foot pain.  He has had a stroke and has some aphasia.  Has right-sided hemiparesis.  He is seen on dialysis.  Tolerating it well.    OBJECTIVE:    Visit Vitals  /80 (BP Location: Right arm, Patient Position: Sitting)   Pulse 82   Temp 36.1 °C (97 °F) (Temporal)   Resp 12          Intake/Output Summary (Last 24 hours) at 4/5/2024 1402  Last data filed at 4/5/2024 1330  Gross per 24 hour   Intake 1280 ml   Output --   Net 1280 ml        General: Awake and Alert, In no distress, Cooperative  HEENT: Oral mucosa moist, EOMI  NECK: Supple  CHEST: No crackles, no wheeze, no tachypnea  CVS: S1,S2 heard, no rubs, RRR  ABD: Soft, Non Tender, BS present  EXT: Right lower extremity edema, left AKA, left arm AV fistula    MEDICATION:    Scheduled medications  aspirin, 81 mg, oral, Daily  atorvastatin, 40 mg, oral, Daily  clopidogrel, 75 mg, oral, Daily  gabapentin, 100 mg, oral, BID  heparin (porcine), 5,000 Units, subcutaneous, q8h  heparin, 1,000 Units, intravenous, After Dialysis  heparin, 2,000 Units, intravenous, After Dialysis  insulin lispro, 0-5 Units, subcutaneous, TID with meals  lidocaine-prilocaine, , Topical, After Dialysis  metoprolol succinate XL, 50 mg, oral, Daily  pantoprazole, 40 mg, oral, Daily before breakfast  pantoprazole, 40 mg, intravenous, Daily  polyethylene glycol, 17 g, oral, Daily  sertraline, 50 mg, oral, Nightly      Continuous medications     PRN medications  PRN medications: acetaminophen **OR** acetaminophen **OR** acetaminophen, dextrose, dextrose, glucagon, glucagon, nitroglycerin, oxyCODONE     RESULTS:    Lab Results   Component Value Date    WBC 3.7 (L) 04/04/2024    HGB 10.7 (L) 04/04/2024    HCT 33.5 (L) 04/04/2024     (H) 04/04/2024     04/04/2024        Lab Results   Component Value Date    CREATININE 2.44 (H) 04/04/2024    BUN  28 (H) 04/04/2024     04/04/2024    K 4.9 04/04/2024    CL 98 04/04/2024    CO2 27 04/04/2024        Radiology Imaging reviewed      ASSESSMENT/PLAN:     1.  ESRD on HD MWF: Patient seen on dialysis.  We are dialyzing him with a 2K bath with 3 L UF for 3-1/2 hours.   Patient has a left arm AV fistula. He is stable for discharge to the nursing facility.  He is dialyzed at the [facility].       2.  Hyperkalemia: This has improved.  Potassium at 4.9.  We are dialyzing him with a 2K bath.     3.  Fluid overload: He has some peripheral edema.  He has heart failure with low EF 15%.  Continue fluid removal with dialysis.    4.  Anemia in ESRD: Current hemoglobin of 10.7.  Will monitor    Thank You very much for allowing me to participate in the care of this Patient    This document was created using dragon dictation and may contain unintended error    Wayne Melendez MD   04/05/24

## 2024-04-05 NOTE — DISCHARGE SUMMARY
Discharge Diagnosis  Chest pain, unspecified type    Issues Requiring Follow-Up  Patient fully evaluated on April 4 and no longer has chest pain.  Patient has been cleared by cardiology and cleared for discharge today.  Electrolytes have improved and corrected by nephrology.    Discharge Meds     Your medication list        CHANGE how you take these medications        Instructions Last Dose Given Next Dose Due   pantoprazole 40 mg EC tablet  Commonly known as: ProtoNix  What changed: Another medication with the same name was added. Make sure you understand how and when to take each.           pantoprazole 40 mg EC tablet  Commonly known as: ProtoNix  What changed: You were already taking a medication with the same name, and this prescription was added. Make sure you understand how and when to take each.      Take 1 tablet (40 mg) by mouth once daily in the morning. Take before meals. Do not crush, chew, or split. Do not start before April 5, 2024.              CONTINUE taking these medications        Instructions Last Dose Given Next Dose Due   acetaminophen 500 mg tablet  Commonly known as: Tylenol           aspirin 81 mg chewable tablet           atorvastatin 40 mg tablet  Commonly known as: Lipitor           B complex-vitamin C-folic acid 1 mg capsule  Commonly known as: Nephrocaps           bisacodyl 10 mg suppository  Commonly known as: Dulcolax           clopidogrel 75 mg tablet  Commonly known as: Plavix           docusate sodium 100 mg capsule  Commonly known as: Colace           gabapentin 100 mg capsule  Commonly known as: Neurontin           heparin (porcine) 5,000 unit/mL injection           Lokelma 10 gram packet  Generic drug: sodium zirconium cyclosilicate           melatonin 3 mg capsule           metoprolol succinate XL 50 mg 24 hr tablet  Commonly known as: Toprol-XL           ondansetron 4 mg tablet  Commonly known as: Zofran           oxyCODONE 5 mg immediate release capsule  Commonly known as:  Oxy-IR           polyethylene glycol 17 gram packet  Commonly known as: Glycolax, Miralax           sennosides 8.6 mg tablet  Commonly known as: Senokot           sertraline 50 mg tablet  Commonly known as: Zoloft           sevelamer carbonate 800 mg tablet  Commonly known as: Renvela           sodium phosphates 9.5-3.5 gram/59 mL enema  Commonly known as: Fleet (Pediatric)                     Where to Get Your Medications        Information about where to get these medications is not yet available    Ask your nurse or doctor about these medications  pantoprazole 40 mg EC tablet         Test Results Pending At Discharge  Pending Labs       No current pending labs.                   Date of Service: 4/3/2024  3:00 PM  Consult Orders   Inpatient consult to Intensivist [[id number]] ordered by Sander Coy DO at 04/03/24 1503          Attested         History of Present Illness:  Brianna Rodas is a 48 y.o. male with a past medical history of CAD s/p CABG X3 in July 2023, HFrEF (EF 20 to 25%), CVA with residual right-sided weakness (2023), ESRD on HD ([facility]), left AKA, hypertension, hyperlipidemia, diabetes who presented while he was on his way to dialysis and began developing midsternal chest pain so he was diverted to [place] ED instead.  He reports some nausea associated with the chest pain earlier.  Chest pain is nonpositional and not radiating, patient denies any diaphoresis or any other associated symptoms.  In the ED, patient was found to have troponin 107 then 113, potassium 8.2 then 7.2.    Of note, patient has had a CABG and CVA in 2023 at [facility].     Past medical history: As above  Past surgical history: Left AKA  Past social history: Former smoker (quit in 2023), denies alcohol or drug use  Past family history: Noncontributory     Review of systems:  10 point review of systems performed and negative except as stated above.     Physical Exam:  General:  Pleasant and cooperative. No apparent distress.  HEENT:   Normocephalic, atraumatic, mucus membranes moist.   Neck:  Trachea midline.  No JVD.    Chest:  Clear to auscultation bilaterally. No wheezes, rales, or rhonchi.  CV:  Regular rate and rhythm.  Positive S1/S2. No murmur, no gallops, no rubs  Abdomen: Bowel sounds present in all four quadrants, abdomen is soft, non-tender, non-distended.  Extremities:  No lower extremity edema or cyanosis. L AKA  Neurological:  AAOx3. No focal deficits.  Skin:  Warm and dry.     Daily progress:         Assessment + Plan:   Patient is a 48-year-old male with a past medical history of CAD s/p CABG X3 in July 2023, HFrEF (EF 20 to 25%), CVA with residual right-sided weakness (2023), ESRD on HD (MWF), left AKA, hypertension, hyperlipidemia, diabetes who presented while he was on his way to dialysis and began developing midsternal chest pain so he was diverted to [place] ED instead.  He reports some nausea associated with the chest pain.  Troponin was found to be 107 then 113, potassium was 8.2 then 7.2.  Patient was admitted to the ICU for dialysis.     Neuro:  No acute abnormalities     CV:  #Chest pain  #Elevated troponin  #HFrEF (EF 20 to 25%)  #CAD s/p CABG x3 (2023)  -Trend troponin  -Cardiology consulted  -Sublingual nitro as needed  -Continue home Plavix, aspirin, statin, metoprolol        Respiratory:  No acute abnormalities     GI:  Continue home PPI     Endo:  SSI     Renal:  #Hyperkalemia  #ESRD on HD  -Dialysis today per nephrology.  Left arm fistula  -Repeat morning BMP     Hematological:  No acute abnormalities     ID:  No acute abnormalities     Vent/O2: Room air  Lines/Devices: PIV  Drips: None  ATBx: None  Fluids: None  Diet: Regular, n.p.o. at midnight  PPX: Heparin subcu, Protonix  Code status: Full code  Dispo: ICU          Attestation signed by Leilani Del Valle MD at 4/4/2024  8:34 AM     Pulmonary and Critical Care Attending Attestation:      Briefly this is a 49 yo M With PMHx of CAD s/p CABG, HFrEF, ESRD on HD  presenting to [facility] ED with CP. Pt was admitted to the MICU for HyperK with need for emergent HD.        #ESRD on HD with HyperK   - Nephro Consult   - HD tonight   - Follow up RFP      #CAD s/p CABG with Hx of HFrEF   #Chest Pain   - Cards Consult   - Trend trops   - Continue BB, Plavix, Statin, ASA      Rest per residents note.      ICU CHECK LIST:   Antimicrobials: -   Oxygen: RA   Drips: -   Feeding/Fluids: ADAT   Analgesia: -  Sedation: -  Thromboprophylaxis: SCDs and SQH   Ulcer prophylaxis: PPI   Glycemic control: BGM with SSI   Bowel care: PRN   Indwelling catheters:   Lines: PIVs   Restraints: -  Dispo: MICU   Code Status: FC                        Cosigned by:      Revision History         Pertinent Physical Exam At Time of Discharge  Physical Exam    Outpatient Follow-Up  No future appointments.  Patient fully evaluated on April 5.  Patient awaiting discharge to skilled nursing after dialysis today.  Patient without complaints of chest pain.    Christiano Higuera MD

## 2024-04-05 NOTE — PROGRESS NOTES
Physical Therapy                 Therapy Communication Note    Patient Name: Brianna Rodas  MRN: [id number]  Today's Date: 4/5/2024     Discipline: Physical Therapy    Missed Visit Reason: Missed Visit Reason: Patient in a medical procedure (dialysis)

## 2024-04-05 NOTE — PROGRESS NOTES
04/05/24 1017   Discharge Planning   Living Arrangements Other (Comment)   Support Systems Spouse/significant other;Children;Friends/neighbors   Type of Residence Skilled nursing facility   Patient expects to be discharged to: return to [facility]   Does the patient need discharge transport arranged? Yes   RoundTrip coordination needed? Yes     Patient with discharge order.  He is currently off unit at dialysis.  This TCC to arrange transportation when he returns to the floor.  Care Coordination team following.  Lenore ESCAMILLA TCC

## 2024-04-06 LAB
ATRIAL RATE: 85 BPM
P AXIS: 57 DEGREES
PR INTERVAL: 224 MS
Q ONSET: 197 MS
QRS COUNT: 14 BEATS
QRS DURATION: 148 MS
QT INTERVAL: 462 MS
QTC CALCULATION(BAZETT): 549 MS
QTC FREDERICIA: 518 MS
R AXIS: 247 DEGREES
T AXIS: 198 DEGREES
T OFFSET: 428 MS
VENTRICULAR RATE: 85 BPM

## 2024-04-06 NOTE — DISCHARGE SUMMARY
Discharge Diagnosis  Chest pain, unspecified type    Issues Requiring Follow-Up  Patient fully evaluated on April 4 and no longer has chest pain.  Patient has been cleared by cardiology and cleared for discharge today.  Electrolytes have improved and corrected by nephrology.    Discharge Meds     Your medication list        CHANGE how you take these medications        Instructions Last Dose Given Next Dose Due   pantoprazole 40 mg EC tablet  Commonly known as: ProtoNix  What changed: Another medication with the same name was added. Make sure you understand how and when to take each.           pantoprazole 40 mg EC tablet  Commonly known as: ProtoNix  What changed: You were already taking a medication with the same name, and this prescription was added. Make sure you understand how and when to take each.      Take 1 tablet (40 mg) by mouth once daily in the morning. Take before meals. Do not crush, chew, or split. Do not start before April 5, 2024.              CONTINUE taking these medications        Instructions Last Dose Given Next Dose Due   acetaminophen 500 mg tablet  Commonly known as: Tylenol           aspirin 81 mg chewable tablet           atorvastatin 40 mg tablet  Commonly known as: Lipitor           B complex-vitamin C-folic acid 1 mg capsule  Commonly known as: Nephrocaps           bisacodyl 10 mg suppository  Commonly known as: Dulcolax           clopidogrel 75 mg tablet  Commonly known as: Plavix           docusate sodium 100 mg capsule  Commonly known as: Colace           gabapentin 100 mg capsule  Commonly known as: Neurontin           heparin (porcine) 5,000 unit/mL injection           Lokelma 10 gram packet  Generic drug: sodium zirconium cyclosilicate           melatonin 3 mg capsule           metoprolol succinate XL 50 mg 24 hr tablet  Commonly known as: Toprol-XL           ondansetron 4 mg tablet  Commonly known as: Zofran           oxyCODONE 5 mg immediate release capsule  Commonly known as:  Oxy-IR           polyethylene glycol 17 gram packet  Commonly known as: Glycolax, Miralax           sennosides 8.6 mg tablet  Commonly known as: Senokot           sertraline 50 mg tablet  Commonly known as: Zoloft           sevelamer carbonate 800 mg tablet  Commonly known as: Renvela           sodium phosphates 9.5-3.5 gram/59 mL enema  Commonly known as: Fleet (Pediatric)                     Where to Get Your Medications        Information about where to get these medications is not yet available    Ask your nurse or doctor about these medications  pantoprazole 40 mg EC tablet         Test Results Pending At Discharge  Pending Labs       No current pending labs.                   Date of Service: 4/3/2024  3:00 PM  Consult Orders   Inpatient consult to Intensivist [[id number]] ordered by Sander Coy DO at 04/03/24 1503          Attested         History of Present Illness:  Brianna Rodas is a 48 y.o. male with a past medical history of CAD s/p CABG X3 in July 2023, HFrEF (EF 20 to 25%), CVA with residual right-sided weakness (2023), ESRD on HD ([facility]), left AKA, hypertension, hyperlipidemia, diabetes who presented while he was on his way to dialysis and began developing midsternal chest pain so he was diverted to [facility] ED instead.  He reports some nausea associated with the chest pain earlier.  Chest pain is nonpositional and not radiating, patient denies any diaphoresis or any other associated symptoms.  In the ED, patient was found to have troponin 107 then 113, potassium 8.2 then 7.2.    Of note, patient has had a CABG and CVA in 2023 at [facility].     Past medical history: As above  Past surgical history: Left AKA  Past social history: Former smoker (quit in 2023), denies alcohol or drug use  Past family history: Noncontributory     Review of systems:  10 point review of systems performed and negative except as stated above.     Physical Exam:  General:  Pleasant and cooperative. No apparent distress.  HEENT:   Normocephalic, atraumatic, mucus membranes moist.   Neck:  Trachea midline.  No JVD.    Chest:  Clear to auscultation bilaterally. No wheezes, rales, or rhonchi.  CV:  Regular rate and rhythm.  Positive S1/S2. No murmur, no gallops, no rubs  Abdomen: Bowel sounds present in all four quadrants, abdomen is soft, non-tender, non-distended.  Extremities:  No lower extremity edema or cyanosis. L AKA  Neurological:  AAOx3. No focal deficits.  Skin:  Warm and dry.     Daily progress:         Assessment + Plan:   Patient is a 48-year-old male with a past medical history of CAD s/p CABG X3 in July 2023, HFrEF (EF 20 to 25%), CVA with residual right-sided weakness (2023), ESRD on HD (MWF), left AKA, hypertension, hyperlipidemia, diabetes who presented while he was on his way to dialysis and began developing midsternal chest pain so he was diverted to [place] ED instead.  He reports some nausea associated with the chest pain.  Troponin was found to be 107 then 113, potassium was 8.2 then 7.2.  Patient was admitted to the ICU for dialysis.     Neuro:  No acute abnormalities     CV:  #Chest pain  #Elevated troponin  #HFrEF (EF 20 to 25%)  #CAD s/p CABG x3 (2023)  -Trend troponin  -Cardiology consulted  -Sublingual nitro as needed  -Continue home Plavix, aspirin, statin, metoprolol        Respiratory:  No acute abnormalities     GI:  Continue home PPI     Endo:  SSI     Renal:  #Hyperkalemia  #ESRD on HD  -Dialysis today per nephrology.  Left arm fistula  -Repeat morning BMP     Hematological:  No acute abnormalities     ID:  No acute abnormalities     Vent/O2: Room air  Lines/Devices: PIV  Drips: None  ATBx: None  Fluids: None  Diet: Regular, n.p.o. at midnight  PPX: Heparin subcu, Protonix  Code status: Full code  Dispo: ICU          Attestation signed by Leilani Del Valle MD at 4/4/2024  8:34 AM     Pulmonary and Critical Care Attending Attestation:      Briefly this is a 47 yo M With PMHx of CAD s/p CABG, HFrEF, ESRD on HD  presenting to [facility] ED with CP. Pt was admitted to the MICU for HyperK with need for emergent HD.        #ESRD on HD with HyperK   - Nephro Consult   - HD tonight   - Follow up RFP      #CAD s/p CABG with Hx of HFrEF   #Chest Pain   - Cards Consult   - Trend trops   - Continue BB, Plavix, Statin, ASA      Rest per residents note.      ICU CHECK LIST:   Antimicrobials: -   Oxygen: RA   Drips: -   Feeding/Fluids: ADAT   Analgesia: -  Sedation: -  Thromboprophylaxis: SCDs and SQH   Ulcer prophylaxis: PPI   Glycemic control: BGM with SSI   Bowel care: PRN   Indwelling catheters:   Lines: PIVs   Restraints: -  Dispo: MICU   Code Status: FC                        Cosigned by:      Revision History         Pertinent Physical Exam At Time of Discharge  Physical Exam    Outpatient Follow-Up  No future appointments.  Patient fully evaluated on April 5.  Patient awaiting discharge to skilled nursing after dialysis today.  Patient without complaints of chest pain.    Patient fully evaluated on April 6 and having no chest pain.  Patient cleared for discharge back to ECF with [facility].    Christiano Higuera MD

## 2025-03-24 RX ORDER — RANOLAZINE 500 MG/1
500 TABLET, EXTENDED RELEASE ORAL 2 TIMES DAILY
Qty: 180 TABLET | Refills: 1 | OUTPATIENT
Start: 2025-03-24

## 2025-04-01 ENCOUNTER — TELEPHONE (OUTPATIENT)
Dept: VASCULAR SURGERY | Age: 50
End: 2025-04-01

## 2025-04-01 ENCOUNTER — PREP FOR PROCEDURE (OUTPATIENT)
Dept: VASCULAR SURGERY | Age: 50
End: 2025-04-01

## 2025-04-01 DIAGNOSIS — T82.590A DIALYSIS AV FISTULA MALFUNCTION, INITIAL ENCOUNTER: ICD-10-CM

## 2025-04-01 NOTE — TELEPHONE ENCOUNTER
----- Message from ABRAHAM CEE MA sent at 4/1/2025  9:12 AM EDT -----  Spoke to wife - scheduled at [facility] Tuesday 4/8/2025 at 3:00 pm - she is aware of location and time  ----- Message -----  From: Lilibeth Wallace MA  Sent: 4/1/2025   8:49 AM EDT  To: Lilibeth Wallace MA    Called and left message for patient to call  ----- Message -----  From: Dianna Vinson, RN  Sent: 3/31/2025   1:28 PM EDT  To: Lilibeth Wallace MA; Dianna Vinson, RN    Please schedule for fistulogram with Dr. Cervantes for prolonged bleeding

## 2025-04-08 ENCOUNTER — APPOINTMENT (OUTPATIENT)
Age: 50
End: 2025-04-08
Attending: SURGERY
Payer: MEDICARE

## 2025-04-08 ENCOUNTER — HOSPITAL ENCOUNTER (OUTPATIENT)
Age: 50
Setting detail: OUTPATIENT SURGERY
Discharge: HOME | End: 2025-04-08
Attending: SURGERY | Admitting: SURGERY
Payer: MEDICARE

## 2025-04-08 VITALS
SYSTOLIC BLOOD PRESSURE: 139 MMHG | DIASTOLIC BLOOD PRESSURE: 75 MMHG | HEIGHT: 67 IN | HEART RATE: 70 BPM | BODY MASS INDEX: 25.54 KG/M2 | TEMPERATURE: 97.7 F | WEIGHT: 162.7 LBS | RESPIRATION RATE: 10 BRPM | OXYGEN SATURATION: 100 %

## 2025-04-08 DIAGNOSIS — T82.590A DIALYSIS AV FISTULA MALFUNCTION, INITIAL ENCOUNTER: Primary | ICD-10-CM

## 2025-04-08 PROBLEM — T82.898A: Status: ACTIVE | Noted: 2025-04-08

## 2025-04-08 LAB — ECHO BSA: 1.87 M2

## 2025-04-08 PROCEDURE — 2709999900 HC NON-CHARGEABLE SUPPLY: Performed by: SURGERY

## 2025-04-08 PROCEDURE — 3600000017 HC SURGERY HYBRID ADDL 15MIN: Performed by: SURGERY

## 2025-04-08 PROCEDURE — 99203 OFFICE O/P NEW LOW 30 MIN: CPT | Performed by: SURGERY

## 2025-04-08 PROCEDURE — 7100000011 HC PHASE II RECOVERY - ADDTL 15 MIN: Performed by: SURGERY

## 2025-04-08 PROCEDURE — 7100000010 HC PHASE II RECOVERY - FIRST 15 MIN: Performed by: SURGERY

## 2025-04-08 PROCEDURE — C1892 INTRO/SHEATH,FIXED,PEEL-AWAY: HCPCS | Performed by: SURGERY

## 2025-04-08 PROCEDURE — 36901 INTRO CATH DIALYSIS CIRCUIT: CPT | Performed by: SURGERY

## 2025-04-08 PROCEDURE — 3600000007 HC SURGERY HYBRID BASE: Performed by: SURGERY

## 2025-04-08 PROCEDURE — 76937 US GUIDE VASCULAR ACCESS: CPT | Performed by: SURGERY

## 2025-04-08 RX ORDER — LIDOCAINE HYDROCHLORIDE 10 MG/ML
INJECTION, SOLUTION INFILTRATION; PERINEURAL
Status: DISCONTINUED
Start: 2025-04-08 | End: 2025-04-08 | Stop reason: HOSPADM

## 2025-04-08 RX ORDER — NITROGLYCERIN 0.4 MG/1
0.4 TABLET SUBLINGUAL EVERY 5 MIN PRN
COMMUNITY

## 2025-04-08 RX ORDER — IODIXANOL 320 MG/ML
INJECTION, SOLUTION INTRAVASCULAR
Status: DISCONTINUED
Start: 2025-04-08 | End: 2025-04-08 | Stop reason: HOSPADM

## 2025-04-08 ASSESSMENT — ENCOUNTER SYMPTOMS
SHORTNESS OF BREATH: 0
COLOR CHANGE: 0
ALLERGIC/IMMUNOLOGIC NEGATIVE: 1
ABDOMINAL PAIN: 0
CHEST TIGHTNESS: 0

## 2025-04-08 NOTE — PROGRESS NOTES
Patient admitted, consent signed, all questions answered.  Pt ready for procedure.  Bed in low position, call light to reach with side rails up 2 of 2.

## 2025-04-08 NOTE — OP NOTE
Operative Note      Patient: Donnie Lopes  YOB: 1975  MRN: [id number]    Date of Procedure: 4/8/2025    Pre-Op Diagnosis Codes:      * Dialysis AV fistula malfunction, initial encounter [T82.590A]    Post-Op Diagnosis: Aneurysmal AV fistula       Procedure(s):  UPPER EXTREMITY FISTULAGRAM    Surgeon(s):  Maria Del Carmen Cervantes MD    Assistant: Dr. GAUTAM Zaidi    Anesthesia: Local    Estimated Blood Loss (mL): 3 ml    Contrast: 13 ml    Complications: None    Specimens: none    Implants: none      Drains: none    Findings:  Infection Present At Time Of Surgery (PATOS) (choose all levels that have infection present):  No infection present  Radiographic Findings: This is a left brachial artery to cephalic vein AV fistula.  There is no inflow, outflow or central venous stenosis.  There is large aneurysmal changes at cannulation zone.  Likely source of prolong bleeding due to thin skin and accessing the aneurysm.    Plan:  He will need open revision of his fistula to divert flow away from aneurysm.  Will get this scheduled in the next few weeks.    Detailed Description of Procedure:     Donnie Lopes was brought to the hybrid catheterization suite for evaluation of left upper extremity AV fistula.  After appropriate timeout performed the left upper extremity was prepped and draped in standard sterile fashion.  I began by evaluating the AV fistula with ultrasound, it was patent and compressible.  Local anesthesia delivered, under ultrasound guidance using a micropuncture needle the AV fistula was accessed, permanent ultrasound images saved.  A 4-Russian micropuncture sheath inserted and flushed.  Fistulagram performed.  No interventions needed.  The sheath was removed and manual pressure held for hemostasis.  Band aid applied, patient tolerated procedure well.      Electronically signed by Maria Del Carmen Cervantes MD on 4/8/2025 at 3:24 PM

## 2025-04-08 NOTE — PROGRESS NOTES
Returned to [facility] post procedure. Alert. Family present. Band aid at left arm dry and intact.

## 2025-04-08 NOTE — H&P
Division of Vascular Surgery        History and Physical      Chief Complaint:      Malfunctioning AV fistula with aneurysmal changes    History of Present Illness:      Donnie Lopes is a 49 y.o. gentleman who presents for evaluation of bleeding AV fistula.  I have not seen him in over 5 years.  He had a brachial cephalic fistula created in 2020 by me and then revised with branch ligation a few months later.  Recently he has been prolong bleeding and is here for evaluation.  On exam he has large aneurysmal changes to cannulation zones.   He has a good thrill, and a little weaker distal to cannulation zone.  I can not palpate radial artery at level of wrist but with compression of fistula there is return of weak radial pulse.  He denies symptoms of ischemic steal, but does say his hand gets tired and weak, however he did have stroke affecting left side of his body.  His hand is warm and there are no signs of ischemia or wounds.      Of note he was managed at [facility] for left lower extremity wounds, underwent bypass and then stenting with eventually needing above knee amputation.  Family was not happy with care at [facility] and comes here for evaluation and second opinion regarding his vascular care.    Medical History:     Past Medical History:   Diagnosis Date    Abscess, gluteal, left     CVA (cerebral vascular accident) (HCC)     Diabetes mellitus (HCC)     Dr. 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 ISRA Parham    Presence of permanent central venous catheter     dialysis catheter, right side of chest       Surgical History:     Past Surgical History:   Procedure Laterality Date    CARDIAC CATHETERIZATION  12/01/2023    CARDIAC PROCEDURE N/A 12/1/2023    hernandez / Left heart cath /

## 2025-04-10 ENCOUNTER — TELEPHONE (OUTPATIENT)
Dept: VASCULAR SURGERY | Age: 50
End: 2025-04-10

## 2025-04-10 NOTE — TELEPHONE ENCOUNTER
----- Message from Dr. Maria Del Carmen Cervantes MD sent at 4/8/2025  3:27 PM EDT -----  Regarding: needs left AV fistula revision  General anesthesia  2 hours  Possible tunneled catheter placement  CVOR 22    Next few weeks    Thank you

## 2025-06-12 NOTE — FLOWSHEET NOTE
"Subjective     Patient ID: Adrian Peralta is a 35 y.o. male.    Chief Complaint: Annual Exam, Insomnia, and Eye Problem (Right eye "pressure" )  35-year-old  male presents to clinic today for annual physical exam.  He has been monitored for prediabetes in the past which has remained well-controlled with diet and exercise.  He continues exercising daily without issue but has recently begun to note difficulty sleeping over the past week.  He has used hydroxyzine in the past with relief but does not have medication available at this time.  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  He reports a family history of his mother having a CABG at the age of 51.  He also reports a family history of diabetes.  He is up-to-date with all vaccinations.  Eye Problem   Pertinent negatives include no eye redness, fever, itching, nausea or vomiting.     Review of Systems   Constitutional:  Negative for appetite change, chills, fatigue and fever.   HENT:  Negative for nasal congestion, ear pain, hearing loss, postnasal drip, rhinorrhea, sinus pressure/congestion, sore throat and tinnitus.    Eyes:  Positive for pain (right eye pressure). Negative for redness, itching and visual disturbance.   Respiratory:  Negative for cough, chest tightness and shortness of breath.    Cardiovascular:  Negative for chest pain and palpitations.   Gastrointestinal:  Negative for abdominal pain, constipation, diarrhea, nausea and vomiting.   Genitourinary:  Negative for decreased urine volume, difficulty urinating, dysuria, frequency, hematuria and urgency.   Musculoskeletal:  Negative for back pain, myalgias, neck pain and neck stiffness.   Integumentary:  Negative for rash.   Neurological:  Negative for dizziness, light-headedness and headaches.   Psychiatric/Behavioral:  Positive for sleep disturbance.           Objective     Physical Exam  Vitals and " 11/26/23 0620   Treatment Team Notification   Reason for Communication Evaluate   Name of Team Member Notified Dr Vivienne Wooten Provider   Method of Communication Call   Response See orders   Notification Time 0618     MD notified of consult, patient's labs, and last treatment was on Thursday. MD gave telephone order to recheck potassium at 0800. MD states that he will determine if patient needs a dialysis treatment today based on that lab value. nursing note reviewed.   Constitutional:       General: He is not in acute distress.     Appearance: Normal appearance. He is well-developed. He is not diaphoretic.   HENT:      Head: Normocephalic and atraumatic.      Right Ear: External ear normal.      Left Ear: External ear normal.      Nose: Nose normal.      Mouth/Throat:      Pharynx: No oropharyngeal exudate.   Eyes:      General: No scleral icterus.        Right eye: No discharge.         Left eye: No discharge.      Conjunctiva/sclera: Conjunctivae normal.      Pupils: Pupils are equal, round, and reactive to light.   Neck:      Thyroid: No thyromegaly.      Vascular: No JVD.      Trachea: No tracheal deviation.   Cardiovascular:      Rate and Rhythm: Normal rate and regular rhythm.      Heart sounds: Normal heart sounds. No murmur heard.     No friction rub. No gallop.   Pulmonary:      Effort: Pulmonary effort is normal. No respiratory distress.      Breath sounds: Normal breath sounds. No stridor. No wheezing, rhonchi or rales.   Chest:      Chest wall: No tenderness.   Abdominal:      General: Bowel sounds are normal. There is no distension.      Palpations: Abdomen is soft. There is no mass.      Tenderness: There is no abdominal tenderness. There is no guarding or rebound.   Musculoskeletal:         General: No tenderness. Normal range of motion.      Cervical back: Normal range of motion and neck supple.   Lymphadenopathy:      Cervical: No cervical adenopathy.   Skin:     General: Skin is warm and dry.      Coloration: Skin is not pale.      Findings: No erythema or rash.   Neurological:      Mental Status: He is alert and oriented to person, place, and time.   Psychiatric:         Mood and Affect: Mood normal.         Behavior: Behavior normal.         Thought Content: Thought content normal.         Judgment: Judgment normal.            Assessment and Plan     1. Annual physical exam  -     CBC Auto Differential; Future; Expected date:  06/12/2025  -     Comprehensive Metabolic Panel; Future; Expected date: 06/12/2025  -     Lipid Panel; Future; Expected date: 06/12/2025  -     T4, Free; Future; Expected date: 06/12/2025  -     TSH; Future; Expected date: 06/12/2025  -     Hemoglobin A1C; Future; Expected date: 06/12/2025  -     Urinalysis, Reflex to Urine Culture Urine, Clean Catch; Future; Expected date: 06/12/2025  -     Vitamin D; Future; Expected date: 06/12/2025    2. Prediabetes    3. Insomnia, unspecified type  -     hydrOXYzine HCL (ATARAX) 50 MG tablet; Take 1 tablet (50 mg total) by mouth nightly as needed (insomnia).  Dispense: 30 tablet; Refill: 11    4. Vitamin D insufficiency  -     Vitamin D; Future; Expected date: 06/12/2025        1. CBC, CMP, UA, TSH, free T4, fasting lipids, vitamin-D level, and hemoglobin A1c.    2. Continue diet and exercise.  Prediabetes is well-controlled with diet.  3. Hydroxyzine 50 mg at bedtime as needed for insomnia.    4. Continue over-the-counter vitamin-D 2000 units daily.  Vitamin-D insufficiency is stable.  5. Return to clinic as needed or in 1 year for annual physical exam.               Follow up in about 1 year (around 6/12/2026), or if symptoms worsen or fail to improve, for Annual exam.

## 2025-07-10 PROBLEM — N18.6 ESRD (END STAGE RENAL DISEASE) ON DIALYSIS (HCC): Status: ACTIVE | Noted: 2025-07-10

## 2025-07-10 PROBLEM — T82.590A MALFUNCTION OF ARTERIOVENOUS DIALYSIS FISTULA: Status: ACTIVE | Noted: 2025-07-10

## 2025-07-10 PROBLEM — Z99.2 ESRD (END STAGE RENAL DISEASE) ON DIALYSIS (HCC): Status: ACTIVE | Noted: 2025-07-10

## 2025-07-18 RX ORDER — LOSARTAN POTASSIUM 25 MG/1
25 TABLET ORAL DAILY
Qty: 30 TABLET | Refills: 3 | Status: SHIPPED | OUTPATIENT
Start: 2025-07-18

## 2025-07-18 RX ORDER — LOSARTAN POTASSIUM 25 MG/1
25 TABLET ORAL DAILY
COMMUNITY
End: 2025-07-18 | Stop reason: SDUPTHER

## 2025-07-24 RX ORDER — LOSARTAN POTASSIUM 25 MG/1
25 TABLET ORAL DAILY
Qty: 90 TABLET | Refills: 1 | OUTPATIENT
Start: 2025-07-24

## 2025-08-05 ENCOUNTER — APPOINTMENT (OUTPATIENT)
Age: 50
End: 2025-08-05
Attending: SURGERY
Payer: MEDICARE

## 2025-08-05 ENCOUNTER — ANESTHESIA (OUTPATIENT)
Dept: OPERATING ROOM | Age: 50
End: 2025-08-05
Payer: MEDICARE

## 2025-08-05 ENCOUNTER — HOSPITAL ENCOUNTER (OUTPATIENT)
Age: 50
Setting detail: OUTPATIENT SURGERY
Discharge: HOME | End: 2025-08-05
Attending: SURGERY | Admitting: SURGERY
Payer: MEDICARE

## 2025-08-05 ENCOUNTER — ANESTHESIA EVENT (OUTPATIENT)
Dept: OPERATING ROOM | Age: 50
End: 2025-08-05
Payer: MEDICARE

## 2025-08-05 VITALS
HEART RATE: 78 BPM | RESPIRATION RATE: 11 BRPM | DIASTOLIC BLOOD PRESSURE: 32 MMHG | TEMPERATURE: 97.5 F | SYSTOLIC BLOOD PRESSURE: 87 MMHG | OXYGEN SATURATION: 91 %

## 2025-08-05 DIAGNOSIS — N18.6 ESRD (END STAGE RENAL DISEASE) (HCC): ICD-10-CM

## 2025-08-05 DIAGNOSIS — M79.602 PAIN OF LEFT UPPER EXTREMITY: Primary | ICD-10-CM

## 2025-08-05 LAB
BUN BLD-MCNC: 38 MG/DL (ref 8–26)
EGFR, POC: 14 ML/MIN/1.73M2
GLUCOSE BLD-MCNC: 158 MG/DL (ref 74–100)
HCT VFR BLD AUTO: 35 % (ref 41–53)
POC CREATININE: 4.8 MG/DL (ref 0.51–1.19)
POC HEMOGLOBIN (CALC): 11.8 G/DL (ref 13.5–17.5)
POTASSIUM BLD-SCNC: 4.4 MMOL/L (ref 3.5–4.5)
SODIUM BLD-SCNC: 142 MMOL/L (ref 138–146)

## 2025-08-05 PROCEDURE — 3700000000 HC ANESTHESIA ATTENDED CARE: Performed by: SURGERY

## 2025-08-05 PROCEDURE — 7100000011 HC PHASE II RECOVERY - ADDTL 15 MIN: Performed by: SURGERY

## 2025-08-05 PROCEDURE — 6360000002 HC RX W HCPCS: Performed by: SURGERY

## 2025-08-05 PROCEDURE — C1768 GRAFT, VASCULAR: HCPCS | Performed by: SURGERY

## 2025-08-05 PROCEDURE — C1892 INTRO/SHEATH,FIXED,PEEL-AWAY: HCPCS | Performed by: SURGERY

## 2025-08-05 PROCEDURE — 3700000001 HC ADD 15 MINUTES (ANESTHESIA): Performed by: SURGERY

## 2025-08-05 PROCEDURE — 2500000003 HC RX 250 WO HCPCS: Performed by: NURSE ANESTHETIST, CERTIFIED REGISTERED

## 2025-08-05 PROCEDURE — 2580000003 HC RX 258: Performed by: NURSE ANESTHETIST, CERTIFIED REGISTERED

## 2025-08-05 PROCEDURE — 2709999900 HC NON-CHARGEABLE SUPPLY: Performed by: SURGERY

## 2025-08-05 PROCEDURE — C1889 IMPLANT/INSERT DEVICE, NOC: HCPCS | Performed by: SURGERY

## 2025-08-05 PROCEDURE — 82565 ASSAY OF CREATININE: CPT

## 2025-08-05 PROCEDURE — 7100000010 HC PHASE II RECOVERY - FIRST 15 MIN: Performed by: SURGERY

## 2025-08-05 PROCEDURE — 6370000000 HC RX 637 (ALT 250 FOR IP): Performed by: SURGERY

## 2025-08-05 PROCEDURE — 84132 ASSAY OF SERUM POTASSIUM: CPT

## 2025-08-05 PROCEDURE — 2500000003 HC RX 250 WO HCPCS: Performed by: SURGERY

## 2025-08-05 PROCEDURE — 7100000000 HC PACU RECOVERY - FIRST 15 MIN: Performed by: SURGERY

## 2025-08-05 PROCEDURE — 82947 ASSAY GLUCOSE BLOOD QUANT: CPT

## 2025-08-05 PROCEDURE — 84520 ASSAY OF UREA NITROGEN: CPT

## 2025-08-05 PROCEDURE — 6360000002 HC RX W HCPCS: Performed by: NURSE ANESTHETIST, CERTIFIED REGISTERED

## 2025-08-05 PROCEDURE — 7100000001 HC PACU RECOVERY - ADDTL 15 MIN: Performed by: SURGERY

## 2025-08-05 PROCEDURE — 3600000014 HC SURGERY LEVEL 4 ADDTL 15MIN: Performed by: SURGERY

## 2025-08-05 PROCEDURE — 84295 ASSAY OF SERUM SODIUM: CPT

## 2025-08-05 PROCEDURE — C1769 GUIDE WIRE: HCPCS | Performed by: SURGERY

## 2025-08-05 PROCEDURE — C1881 DIALYSIS ACCESS SYSTEM: HCPCS | Performed by: SURGERY

## 2025-08-05 PROCEDURE — 6360000002 HC RX W HCPCS: Performed by: ANESTHESIOLOGY

## 2025-08-05 PROCEDURE — 3600000004 HC SURGERY LEVEL 4 BASE: Performed by: SURGERY

## 2025-08-05 PROCEDURE — 2580000003 HC RX 258: Performed by: SURGERY

## 2025-08-05 PROCEDURE — 93005 ELECTROCARDIOGRAM TRACING: CPT | Performed by: SURGERY

## 2025-08-05 PROCEDURE — 2500000003 HC RX 250 WO HCPCS: Performed by: ANESTHESIOLOGY

## 2025-08-05 PROCEDURE — 85014 HEMATOCRIT: CPT

## 2025-08-05 DEVICE — IMPLANTABLE DEVICE: Type: IMPLANTABLE DEVICE | Site: ARM | Status: FUNCTIONAL

## 2025-08-05 DEVICE — GRAFT VASC L15CM ID6MM BOV CAR ART CLLGN FOR FUNC HEMO DYLS: Type: IMPLANTABLE DEVICE | Site: ARM | Status: FUNCTIONAL

## 2025-08-05 RX ORDER — GLYCOPYRROLATE 0.2 MG/ML
INJECTION INTRAMUSCULAR; INTRAVENOUS
Status: DISCONTINUED | OUTPATIENT
Start: 2025-08-05 | End: 2025-08-06 | Stop reason: SDUPTHER

## 2025-08-05 RX ORDER — FENTANYL CITRATE 50 UG/ML
INJECTION, SOLUTION INTRAMUSCULAR; INTRAVENOUS
Status: DISCONTINUED | OUTPATIENT
Start: 2025-08-05 | End: 2025-08-06 | Stop reason: SDUPTHER

## 2025-08-05 RX ORDER — MIDAZOLAM HYDROCHLORIDE 2 MG/2ML
1 INJECTION, SOLUTION INTRAMUSCULAR; INTRAVENOUS EVERY 10 MIN PRN
Status: DISCONTINUED | OUTPATIENT
Start: 2025-08-05 | End: 2025-08-05 | Stop reason: HOSPADM

## 2025-08-05 RX ORDER — CEFAZOLIN SODIUM 1 G/3ML
INJECTION, POWDER, FOR SOLUTION INTRAMUSCULAR; INTRAVENOUS
Status: DISCONTINUED | OUTPATIENT
Start: 2025-08-05 | End: 2025-08-06 | Stop reason: SDUPTHER

## 2025-08-05 RX ORDER — DEXMEDETOMIDINE HYDROCHLORIDE 4 UG/ML
INJECTION, SOLUTION INTRAVENOUS
Status: DISCONTINUED
Start: 2025-08-05 | End: 2025-08-05 | Stop reason: HOSPADM

## 2025-08-05 RX ORDER — PHENYLEPHRINE HCL IN 0.9% NACL 1 MG/10 ML
SYRINGE (ML) INTRAVENOUS
Status: DISCONTINUED | OUTPATIENT
Start: 2025-08-05 | End: 2025-08-06 | Stop reason: SDUPTHER

## 2025-08-05 RX ORDER — MAGNESIUM HYDROXIDE 1200 MG/15ML
LIQUID ORAL CONTINUOUS PRN
Status: COMPLETED | OUTPATIENT
Start: 2025-08-05 | End: 2025-08-05

## 2025-08-05 RX ORDER — PROPOFOL 10 MG/ML
INJECTION, EMULSION INTRAVENOUS
Status: DISCONTINUED | OUTPATIENT
Start: 2025-08-05 | End: 2025-08-06 | Stop reason: SDUPTHER

## 2025-08-05 RX ORDER — ROCURONIUM BROMIDE 10 MG/ML
INJECTION, SOLUTION INTRAVENOUS
Status: DISCONTINUED | OUTPATIENT
Start: 2025-08-05 | End: 2025-08-06 | Stop reason: SDUPTHER

## 2025-08-05 RX ORDER — SODIUM CHLORIDE 9 MG/ML
INJECTION, SOLUTION INTRAVENOUS PRN
Status: DISCONTINUED | OUTPATIENT
Start: 2025-08-05 | End: 2025-08-05 | Stop reason: HOSPADM

## 2025-08-05 RX ORDER — MIDAZOLAM HYDROCHLORIDE 1 MG/ML
INJECTION, SOLUTION INTRAMUSCULAR; INTRAVENOUS
Status: DISCONTINUED | OUTPATIENT
Start: 2025-08-05 | End: 2025-08-06 | Stop reason: SDUPTHER

## 2025-08-05 RX ORDER — SODIUM CHLORIDE 9 MG/ML
INJECTION, SOLUTION INTRAVENOUS
Status: DISCONTINUED | OUTPATIENT
Start: 2025-08-05 | End: 2025-08-06 | Stop reason: SDUPTHER

## 2025-08-05 RX ORDER — FENTANYL CITRATE 50 UG/ML
50 INJECTION, SOLUTION INTRAMUSCULAR; INTRAVENOUS EVERY 5 MIN PRN
Status: COMPLETED | OUTPATIENT
Start: 2025-08-05 | End: 2025-08-05

## 2025-08-05 RX ORDER — FENTANYL CITRATE 50 UG/ML
25 INJECTION, SOLUTION INTRAMUSCULAR; INTRAVENOUS EVERY 5 MIN PRN
Status: COMPLETED | OUTPATIENT
Start: 2025-08-05 | End: 2025-08-05

## 2025-08-05 RX ORDER — SODIUM CHLORIDE 0.9 % (FLUSH) 0.9 %
5-40 SYRINGE (ML) INJECTION EVERY 12 HOURS SCHEDULED
Status: DISCONTINUED | OUTPATIENT
Start: 2025-08-05 | End: 2025-08-05 | Stop reason: HOSPADM

## 2025-08-05 RX ORDER — HEPARIN SODIUM 1000 [USP'U]/ML
INJECTION, SOLUTION INTRAVENOUS; SUBCUTANEOUS PRN
Status: DISCONTINUED | OUTPATIENT
Start: 2025-08-05 | End: 2025-08-05 | Stop reason: ALTCHOICE

## 2025-08-05 RX ORDER — SODIUM CHLORIDE 0.9 % (FLUSH) 0.9 %
5-40 SYRINGE (ML) INJECTION PRN
Status: DISCONTINUED | OUTPATIENT
Start: 2025-08-05 | End: 2025-08-05 | Stop reason: HOSPADM

## 2025-08-05 RX ORDER — LIDOCAINE HYDROCHLORIDE 10 MG/ML
INJECTION, SOLUTION INFILTRATION; PERINEURAL PRN
Status: DISCONTINUED | OUTPATIENT
Start: 2025-08-05 | End: 2025-08-05 | Stop reason: ALTCHOICE

## 2025-08-05 RX ORDER — ATROPINE SULFATE 0.1 MG/ML
INJECTION INTRAVENOUS
Status: DISCONTINUED | OUTPATIENT
Start: 2025-08-05 | End: 2025-08-06 | Stop reason: SDUPTHER

## 2025-08-05 RX ORDER — OXYCODONE AND ACETAMINOPHEN 5; 325 MG/1; MG/1
1 TABLET ORAL EVERY 6 HOURS PRN
Qty: 28 TABLET | Refills: 0 | Status: SHIPPED | OUTPATIENT
Start: 2025-08-05 | End: 2025-08-12

## 2025-08-05 RX ORDER — PROPOFOL 10 MG/ML
INJECTION, EMULSION INTRAVENOUS
Status: COMPLETED
Start: 2025-08-05 | End: 2025-08-05

## 2025-08-05 RX ORDER — LIDOCAINE HYDROCHLORIDE 10 MG/ML
INJECTION, SOLUTION EPIDURAL; INFILTRATION; INTRACAUDAL; PERINEURAL
Status: DISCONTINUED | OUTPATIENT
Start: 2025-08-05 | End: 2025-08-06 | Stop reason: SDUPTHER

## 2025-08-05 RX ORDER — HEPARIN SODIUM 1000 [USP'U]/ML
INJECTION, SOLUTION INTRAVENOUS; SUBCUTANEOUS
Status: DISCONTINUED | OUTPATIENT
Start: 2025-08-05 | End: 2025-08-06 | Stop reason: SDUPTHER

## 2025-08-05 RX ORDER — PROTAMINE SULFATE 10 MG/ML
INJECTION, SOLUTION INTRAVENOUS
Status: DISCONTINUED | OUTPATIENT
Start: 2025-08-05 | End: 2025-08-06 | Stop reason: SDUPTHER

## 2025-08-05 RX ORDER — ONDANSETRON 2 MG/ML
4 INJECTION INTRAMUSCULAR; INTRAVENOUS
Status: DISCONTINUED | OUTPATIENT
Start: 2025-08-05 | End: 2025-08-05 | Stop reason: HOSPADM

## 2025-08-05 RX ORDER — SODIUM CHLORIDE 9 MG/ML
INJECTION, SOLUTION INTRAVENOUS CONTINUOUS
Status: DISCONTINUED | OUTPATIENT
Start: 2025-08-05 | End: 2025-08-05 | Stop reason: HOSPADM

## 2025-08-05 RX ADMIN — FENTANYL CITRATE 25 MCG: 50 INJECTION INTRAMUSCULAR; INTRAVENOUS at 13:40

## 2025-08-05 RX ADMIN — Medication 200 MCG: at 09:00

## 2025-08-05 RX ADMIN — ATROPINE SULFATE 0.4 MG: 0.1 INJECTION, SOLUTION INTRAVENOUS at 12:44

## 2025-08-05 RX ADMIN — SODIUM CHLORIDE, PRESERVATIVE FREE 10 ML: 5 INJECTION INTRAVENOUS at 08:16

## 2025-08-05 RX ADMIN — Medication 100 MCG: at 11:27

## 2025-08-05 RX ADMIN — MIDAZOLAM 2 MG: 1 INJECTION INTRAMUSCULAR; INTRAVENOUS at 08:46

## 2025-08-05 RX ADMIN — LIDOCAINE HYDROCHLORIDE 50 MG: 10 INJECTION, SOLUTION EPIDURAL; INFILTRATION; INTRACAUDAL; PERINEURAL at 08:49

## 2025-08-05 RX ADMIN — FENTANYL CITRATE 50 MCG: 50 INJECTION INTRAMUSCULAR; INTRAVENOUS at 13:31

## 2025-08-05 RX ADMIN — PROPOFOL 100 MG: 10 INJECTION, EMULSION INTRAVENOUS at 08:50

## 2025-08-05 RX ADMIN — FENTANYL CITRATE 50 MCG: 50 INJECTION, SOLUTION INTRAMUSCULAR; INTRAVENOUS at 08:47

## 2025-08-05 RX ADMIN — PHENYLEPHRINE HYDROCHLORIDE 5 MCG/MIN: 10 INJECTION INTRAVENOUS at 09:10

## 2025-08-05 RX ADMIN — ROCURONIUM BROMIDE 40 MG: 10 INJECTION INTRAVENOUS at 08:51

## 2025-08-05 RX ADMIN — HEPARIN SODIUM 5000 UNITS: 1000 INJECTION, SOLUTION INTRAVENOUS; SUBCUTANEOUS at 10:40

## 2025-08-05 RX ADMIN — PROPOFOL 50 MG: 10 INJECTION, EMULSION INTRAVENOUS at 12:06

## 2025-08-05 RX ADMIN — FENTANYL CITRATE 50 MCG: 50 INJECTION INTRAMUSCULAR; INTRAVENOUS at 13:22

## 2025-08-05 RX ADMIN — Medication 100 MCG: at 08:53

## 2025-08-05 RX ADMIN — FENTANYL CITRATE 25 MCG: 50 INJECTION INTRAMUSCULAR; INTRAVENOUS at 13:45

## 2025-08-05 RX ADMIN — PROPOFOL 50 MG: 10 INJECTION, EMULSION INTRAVENOUS at 12:20

## 2025-08-05 RX ADMIN — Medication 200 MCG: at 09:08

## 2025-08-05 RX ADMIN — GLYCOPYRROLATE 0.2 MG: 0.2 INJECTION, SOLUTION INTRAMUSCULAR; INTRAVENOUS at 08:57

## 2025-08-05 RX ADMIN — SODIUM CHLORIDE: 9 INJECTION, SOLUTION INTRAVENOUS at 08:33

## 2025-08-05 RX ADMIN — PROTAMINE SULFATE 20 MG: 10 INJECTION, SOLUTION INTRAVENOUS at 11:51

## 2025-08-05 RX ADMIN — PROPOFOL 50 MG: 10 INJECTION, EMULSION INTRAVENOUS at 12:28

## 2025-08-05 RX ADMIN — CEFAZOLIN 2 G: 1 INJECTION, POWDER, FOR SOLUTION INTRAMUSCULAR; INTRAVENOUS at 08:57

## 2025-08-05 RX ADMIN — SODIUM CHLORIDE: 9 INJECTION, SOLUTION INTRAVENOUS at 08:15

## 2025-08-05 RX ADMIN — FENTANYL CITRATE 50 MCG: 50 INJECTION, SOLUTION INTRAMUSCULAR; INTRAVENOUS at 10:15

## 2025-08-05 ASSESSMENT — PAIN SCALES - GENERAL
PAINLEVEL_OUTOF10: 10
PAINLEVEL_OUTOF10: 10
PAINLEVEL_OUTOF10: 8
PAINLEVEL_OUTOF10: 10

## 2025-08-06 LAB
EKG ATRIAL RATE: 77 BPM
EKG P AXIS: 72 DEGREES
EKG P-R INTERVAL: 222 MS
EKG Q-T INTERVAL: 464 MS
EKG QRS DURATION: 112 MS
EKG QTC CALCULATION (BAZETT): 525 MS
EKG R AXIS: -35 DEGREES
EKG T AXIS: 207 DEGREES
EKG VENTRICULAR RATE: 77 BPM

## 2025-08-12 PROBLEM — M79.602 PAIN OF LEFT UPPER EXTREMITY: Status: ACTIVE | Noted: 2025-08-12

## 2025-08-21 ENCOUNTER — OFFICE VISIT (OUTPATIENT)
Dept: VASCULAR SURGERY | Age: 50
End: 2025-08-21

## 2025-08-21 VITALS
HEIGHT: 67 IN | WEIGHT: 153.5 LBS | BODY MASS INDEX: 24.09 KG/M2 | HEART RATE: 63 BPM | SYSTOLIC BLOOD PRESSURE: 109 MMHG | DIASTOLIC BLOOD PRESSURE: 67 MMHG | OXYGEN SATURATION: 97 % | TEMPERATURE: 98.2 F

## 2025-08-21 DIAGNOSIS — Z98.890 S/P ARTERIOVENOUS (AV) FISTULA REPAIR: Primary | ICD-10-CM

## 2025-08-21 DIAGNOSIS — Z86.79 S/P ARTERIOVENOUS (AV) FISTULA REPAIR: Primary | ICD-10-CM

## 2025-08-21 PROCEDURE — 99024 POSTOP FOLLOW-UP VISIT: CPT | Performed by: SURGERY

## 2025-08-21 RX ORDER — ASPIRIN 81 MG/1
81 TABLET ORAL DAILY
COMMUNITY

## 2025-08-21 ASSESSMENT — ENCOUNTER SYMPTOMS
SHORTNESS OF BREATH: 0
ABDOMINAL PAIN: 0
COLOR CHANGE: 0
CHEST TIGHTNESS: 0
ALLERGIC/IMMUNOLOGIC NEGATIVE: 1

## (undated) DEVICE — SET EXTN IV L30IN TBNG DIA0.1IN PRIMING 4ML MACBOR FEM ADPT

## (undated) DEVICE — DRESSING TRNSPAR W2XL2.75IN FLM SHT SEMIPERMEABLE WIND

## (undated) DEVICE — SUTURE MCRYL SZ 5-0 L18IN ABSRB UD PC-3 L16MM 3/8 CIR Y844G

## (undated) DEVICE — KIT MICRO INTRO 4FR STIFF 40CM NIGHTENALL TUNGSTEN 7SMT

## (undated) DEVICE — PENROSE DRAIN 18 X .5" SILICONE: Brand: MEDLINE

## (undated) DEVICE — 3M™ STERI-STRIP™ COMPOUND BENZOIN TINCTURE 40 BAGS/CARTON 4 CARTONS/CASE C1544: Brand: 3M™ STERI-STRIP™

## (undated) DEVICE — BLADE ES ELASTOMERIC COAT INSUL DURABLE BEND UPTO 90DEG

## (undated) DEVICE — SYRINGE MED 10ML LUERLOCK TIP W/O SFTY DISP

## (undated) DEVICE — STRIP,CLOSURE,WOUND,MEDI-STRIP,1/2X4: Brand: MEDLINE

## (undated) DEVICE — STRAP,POSITIONING,KNEE/BODY,FOAM,4X60": Brand: MEDLINE

## (undated) DEVICE — Device

## (undated) DEVICE — CATHETER IV 18GA L1.16IN OD1.27-1.3462MM ID0.9398-1.016MM

## (undated) DEVICE — PROVE COVER: Brand: UNBRANDED

## (undated) DEVICE — GLOVE SURG SZ 75 L12IN FNGR THK79MIL GRN LTX FREE

## (undated) DEVICE — DRAPE LAP 102X78X121IN POLYPR SMS STD T INSTR PD CNTOUR

## (undated) DEVICE — SUTURE VICRYL + SZ 4-0 L27IN ABSRB UD L26MM SH 1/2 CIR VCP415H

## (undated) DEVICE — COVER LT HNDL GRN PLAS SFT FLX 2 PER PK

## (undated) DEVICE — TOWEL,OR,DSP,ST,NATURAL,DLX,4/PK,20PK/CS: Brand: MEDLINE

## (undated) DEVICE — DRAPE C ARM W/ POLY STRP W42XL72IN FOR MOB XR

## (undated) DEVICE — SUTURE MONOCRYL SZ 5-0 L18IN ABSRB VLT P-3 L13MM 3/8 CIR REV Y463G

## (undated) DEVICE — CATHETER ANGIO IM 0.056 INX6 FRX100 CM EXPO

## (undated) DEVICE — GLOVE SURG SZ 8 CRM LTX FREE POLYISOPRENE POLYMER BEAD ANTI

## (undated) DEVICE — CONTAINER SPEC 4OZ PNEUMAT TB OR THRD LID PT ID LBL

## (undated) DEVICE — TRAY SURG CUST CRD CATH TOLEDO

## (undated) DEVICE — IV START KIT WITH SECUREMENT DEVICES

## (undated) DEVICE — SPONGE GZ W2XL2IN NONWOVEN 4 PLY FASTER WICKING ABIL AVANT

## (undated) DEVICE — GLOVE SURG SZ 75 CRM LTX FREE POLYISOPRENE POLYMER BEAD ANTI

## (undated) DEVICE — COVER LT HNDL BLU PLAS

## (undated) DEVICE — GLOVE SURG SZ 7 CRM LTX FREE POLYISOPRENE POLYMER BEAD ANTI

## (undated) DEVICE — SPONGE LAP W18XL18IN WHT COT 4 PLY FLD STRUNG RADPQ DISP ST 2 PER PACK

## (undated) DEVICE — SUTURE PERMA-HAND SZ 2-0 L30IN NONABSORBABLE BLK L26MM SH K833H

## (undated) DEVICE — GUIDEWIRE VASC L75CM DIA0.035IN TIP L7CM PTFE COAT S STL

## (undated) DEVICE — SUTURE N ABSRB MONOFILAMENT 5-0 C-1 90 CM 13 MM DA PROLENE

## (undated) DEVICE — SUTURE N ABSRB L 36 IN SZ 5-0 NDL L 13 MM POLYPRO OR PPL BLU

## (undated) DEVICE — STRAP ARMBRD W1.5XL32IN FOAM STR YET SFT W/ HK AND LOOP

## (undated) DEVICE — DRAPE,UTILITY,XL,4/PK,STERILE: Brand: MEDLINE

## (undated) DEVICE — ANGIOGRAPHIC CATHETER: Brand: EXPO™

## (undated) DEVICE — SYRINGE MED 20ML STD CLR PLAS LUERLOCK TIP N CTRL DISP

## (undated) DEVICE — GLOVE ORANGE PI 7   MSG9070

## (undated) DEVICE — TAPE UMB 72X7/32 IN

## (undated) DEVICE — SYRINGE MED 5ML STD CLR PLAS LUERLOCK TIP N CTRL DISP

## (undated) DEVICE — DECANTER BAG 9": Brand: MEDLINE INDUSTRIES, INC.

## (undated) DEVICE — HYBRID ROOM PACK: Brand: MEDLINE INDUSTRIES, INC.

## (undated) DEVICE — SURGICAL PROCEDURE TRAY CRD CATH SVMMC

## (undated) DEVICE — TUBING SUCT L2FT DIA0.187IN LNG CONN W/ CLMP FRAZ

## (undated) DEVICE — CONTAINER SPEC 33OZ URIN COLL BIODEGRADABLE PAPER DISP

## (undated) DEVICE — SUTURE NONABSORBABLE MONOFILAMENT 7-0 BV-1 1X24 IN PROLENE 8702H

## (undated) DEVICE — GEL US 20GM NONIRRITATING OVERWRAPPED FILE PCH TRNSMIT

## (undated) DEVICE — SUTURE VICRYL + SZ 3-0 L27IN ABSRB UD L26MM SH 1/2 CIR VCP416H

## (undated) DEVICE — APPLICATOR MEDICATED 26 CC SOLUTION HI LT ORNG CHLORAPREP

## (undated) DEVICE — SUTURE PROL SZ 6-0 L24IN NONABSORBABLE BLU L9.3MM BV-1 3/8 8805H

## (undated) DEVICE — TUBING AMB

## (undated) DEVICE — SET PEN AND LBL AND L BWL LBL PAL PEN AND LBLS PAL700]

## (undated) DEVICE — SUTURE NONABSORBABLE MONOFILAMENT 6-0 C-1 1X30 IN PROLENE 8706H

## (undated) DEVICE — APPLICATOR MEDICATED 3 CC SOLUTION HI LT ORNG CHLORAPREP

## (undated) DEVICE — GOWN SURG XL L47IN BLU NONREINFORCED HK AND LOOP AAMI LEV 3

## (undated) DEVICE — CONTAINER,SPECIMEN,4OZ,OR STRL: Brand: MEDLINE

## (undated) DEVICE — STRAP POS FOAM SFT STR FOR KNEE BODY

## (undated) DEVICE — SUTURE MONOCRYL SZ 5-0 L18IN ABSRB UD PC-3 L16MM 3/8 CIR Y844G

## (undated) DEVICE — BLADE SURG 15 TWIN BK CARBON STL STRL CISION LF DISP

## (undated) DEVICE — SUTURE VCRL SZ 4-0 L27IN ABSRB UD L26MM SH 1/2 CIR J415H

## (undated) DEVICE — E-Z CLEAN, NON-STICK, PTFE COATED, ELECTROSURGICAL BLADE ELECTRODE, MODIFIED EXTENDED INSULATION, 2.5 INCH (6.35 CM): Brand: MEGADYNE

## (undated) DEVICE — PROTECTOR ULN NRV PUR FOAM HK LOOP STRP ANATOMICALLY

## (undated) DEVICE — PACK SURG CUST AV FIST CUST PACK SURG CUST SVMMC

## (undated) DEVICE — GLOVE SURG SZ 65 THK91MIL LTX FREE SYN POLYISOPRENE

## (undated) DEVICE — GOWN,SIRUS,NONRNF,SETINSLV,XL,20/CS: Brand: MEDLINE

## (undated) DEVICE — DRESSING TRNSPAR W3.5XL4.5IN CHG IMPREG GEL PD ANTIMIC IV 1657

## (undated) DEVICE — SPONGE GZ W3XL3IN 4 PLY RAYON POLY STD NONWOVEN

## (undated) DEVICE — GLOVE ORANGE PI 7 1/2   MSG9075

## (undated) DEVICE — SUTURE PERMAHAND SZ 4-0 L18IN NONABSORBABLE BLK SILK BRAID A183H

## (undated) DEVICE — SUTURE N ABSRB L 18 IN SZ 4-0 NDL L 19 MM NYL MONOFILAMENT

## (undated) DEVICE — TOWEL SURG W17XL27IN NAT COT DISP ST 80 PER CA